# Patient Record
Sex: MALE | Race: WHITE | NOT HISPANIC OR LATINO | Employment: FULL TIME | URBAN - METROPOLITAN AREA
[De-identification: names, ages, dates, MRNs, and addresses within clinical notes are randomized per-mention and may not be internally consistent; named-entity substitution may affect disease eponyms.]

---

## 2024-06-10 ENCOUNTER — HOSPITAL ENCOUNTER (OUTPATIENT)
Dept: TELEMEDICINE | Facility: HOSPITAL | Age: 64
Discharge: HOME | End: 2024-06-10
Payer: COMMERCIAL

## 2024-06-10 ENCOUNTER — HOSPITAL ENCOUNTER (INPATIENT)
Facility: HOSPITAL | Age: 64
LOS: 7 days | Discharge: NURSING, REHAB OR LONG-TERM CARE | DRG: 948 | End: 2024-06-17
Attending: INTERNAL MEDICINE | Admitting: INTERNAL MEDICINE
Payer: COMMERCIAL

## 2024-06-10 DIAGNOSIS — I63.9 STROKE DETERMINED BY CLINICAL ASSESSMENT: Primary | ICD-10-CM

## 2024-06-10 DIAGNOSIS — I63.9 CVA (CEREBRAL VASCULAR ACCIDENT): ICD-10-CM

## 2024-06-10 DIAGNOSIS — R00.1 SINUS BRADYCARDIA: ICD-10-CM

## 2024-06-10 DIAGNOSIS — I63.9 STROKE: ICD-10-CM

## 2024-06-10 DIAGNOSIS — R07.9 CHEST PAIN: ICD-10-CM

## 2024-06-10 PROBLEM — R07.89 OTHER CHEST PAIN: Status: ACTIVE | Noted: 2024-06-10

## 2024-06-10 LAB
ABORH RETYPE: NORMAL
ALBUMIN SERPL-MCNC: 3.9 G/DL (ref 3.4–4.8)
ALBUMIN/GLOB SERPL: 1.3 RATIO (ref 1.1–2)
ALP SERPL-CCNC: 52 UNIT/L (ref 40–150)
ALT SERPL-CCNC: 77 UNIT/L (ref 0–55)
ANION GAP SERPL CALC-SCNC: 6 MEQ/L
AST SERPL-CCNC: 43 UNIT/L (ref 5–34)
AV INDEX (PROSTH): 0.77
AV MEAN GRADIENT: 3 MMHG
AV PEAK GRADIENT: 5 MMHG
AV VELOCITY RATIO: 0.77
BASOPHILS # BLD AUTO: 0.1 X10(3)/MCL
BASOPHILS NFR BLD AUTO: 1.1 %
BILIRUB SERPL-MCNC: 0.9 MG/DL
BSA FOR ECHO PROCEDURE: 2.26 M2
BUN SERPL-MCNC: 10.5 MG/DL (ref 8.4–25.7)
CALCIUM SERPL-MCNC: 9.2 MG/DL (ref 8.8–10)
CHLORIDE SERPL-SCNC: 106 MMOL/L (ref 98–107)
CHOLEST SERPL-MCNC: 141 MG/DL
CHOLEST/HDLC SERPL: 4 {RATIO} (ref 0–5)
CO2 SERPL-SCNC: 25 MMOL/L (ref 23–31)
CREAT SERPL-MCNC: 0.87 MG/DL (ref 0.73–1.18)
CREAT/UREA NIT SERPL: 12
CV ECHO LV RWT: 0.49 CM
DOP CALC AO PEAK VEL: 1.12 M/S
DOP CALC AO VTI: 26.2 CM
DOP CALC LVOT PEAK VEL: 0.86 M/S
DOP CALCLVOT PEAK VEL VTI: 20.1 CM
E WAVE DECELERATION TIME: 174 MSEC
E/A RATIO: 0.86
E/E' RATIO: 9.54 M/S
ECHO LV POSTERIOR WALL: 1 CM (ref 0.6–1.1)
EOSINOPHIL # BLD AUTO: 0.45 X10(3)/MCL (ref 0–0.9)
EOSINOPHIL NFR BLD AUTO: 4.8 %
ERYTHROCYTE [DISTWIDTH] IN BLOOD BY AUTOMATED COUNT: 12.7 % (ref 11.5–17)
EST. AVERAGE GLUCOSE BLD GHB EST-MCNC: 114 MG/DL
FRACTIONAL SHORTENING: 39 % (ref 28–44)
GFR SERPLBLD CREATININE-BSD FMLA CKD-EPI: >60 ML/MIN/1.73/M2
GLOBULIN SER-MCNC: 3 GM/DL (ref 2.4–3.5)
GLUCOSE SERPL-MCNC: 89 MG/DL (ref 82–115)
GROUP & RH: NORMAL
HBA1C MFR BLD: 5.6 %
HCT VFR BLD AUTO: 46.2 % (ref 42–52)
HDLC SERPL-MCNC: 35 MG/DL (ref 35–60)
HGB BLD-MCNC: 15 G/DL (ref 14–18)
IMM GRANULOCYTES # BLD AUTO: 0.02 X10(3)/MCL (ref 0–0.04)
IMM GRANULOCYTES NFR BLD AUTO: 0.2 %
INDIRECT COOMBS: NORMAL
INTERVENTRICULAR SEPTUM: 1.5 CM (ref 0.6–1.1)
LDLC SERPL CALC-MCNC: 89 MG/DL (ref 50–140)
LEFT ATRIUM SIZE: 3.4 CM
LEFT ATRIUM VOLUME INDEX MOD: 25.4 ML/M2
LEFT ATRIUM VOLUME MOD: 57.1 CM3
LEFT INTERNAL DIMENSION IN SYSTOLE: 2.5 CM (ref 2.1–4)
LEFT VENTRICLE DIASTOLIC VOLUME INDEX: 32.98 ML/M2
LEFT VENTRICLE DIASTOLIC VOLUME: 74.2 ML
LEFT VENTRICLE MASS INDEX: 81 G/M2
LEFT VENTRICLE SYSTOLIC VOLUME INDEX: 9.9 ML/M2
LEFT VENTRICLE SYSTOLIC VOLUME: 22.3 ML
LEFT VENTRICULAR INTERNAL DIMENSION IN DIASTOLE: 4.1 CM (ref 3.5–6)
LEFT VENTRICULAR MASS: 182.45 G
LV LATERAL E/E' RATIO: 7.75 M/S
LV SEPTAL E/E' RATIO: 12.4 M/S
LVOT MG: 1 MMHG
LVOT MV: 0.56 CM/S
LYMPHOCYTES # BLD AUTO: 2.73 X10(3)/MCL (ref 0.6–4.6)
LYMPHOCYTES NFR BLD AUTO: 29.1 %
MCH RBC QN AUTO: 29.9 PG (ref 27–31)
MCHC RBC AUTO-ENTMCNC: 32.5 G/DL (ref 33–36)
MCV RBC AUTO: 92 FL (ref 80–94)
MONOCYTES # BLD AUTO: 0.75 X10(3)/MCL (ref 0.1–1.3)
MONOCYTES NFR BLD AUTO: 8 %
MV PEAK A VEL: 0.72 M/S
MV PEAK E VEL: 0.62 M/S
NEUTROPHILS # BLD AUTO: 5.34 X10(3)/MCL (ref 2.1–9.2)
NEUTROPHILS NFR BLD AUTO: 56.8 %
NRBC BLD AUTO-RTO: 0 %
OHS LV EJECTION FRACTION SIMPSONS BIPLANE MOD: 59 %
OHS QRS DURATION: 80 MS
OHS QTC CALCULATION: 422 MS
PLATELET # BLD AUTO: 202 X10(3)/MCL (ref 130–400)
PMV BLD AUTO: 10.8 FL (ref 7.4–10.4)
POTASSIUM SERPL-SCNC: 4.4 MMOL/L (ref 3.5–5.1)
PROT SERPL-MCNC: 6.9 GM/DL (ref 5.8–7.6)
PV PEAK GRADIENT: 2 MMHG
PV PEAK VELOCITY: 0.72 M/S
RBC # BLD AUTO: 5.02 X10(6)/MCL (ref 4.7–6.1)
SODIUM SERPL-SCNC: 137 MMOL/L (ref 136–145)
SPECIMEN OUTDATE: NORMAL
TDI LATERAL: 0.08 M/S
TDI SEPTAL: 0.05 M/S
TDI: 0.07 M/S
TRICUSPID ANNULAR PLANE SYSTOLIC EXCURSION: 1.78 CM
TRIGL SERPL-MCNC: 84 MG/DL (ref 34–140)
TROPONIN I SERPL-MCNC: <0.01 NG/ML (ref 0–0.04)
TROPONIN I SERPL-MCNC: <0.01 NG/ML (ref 0–0.04)
TSH SERPL-ACNC: 0.95 UIU/ML (ref 0.35–4.94)
VLDLC SERPL CALC-MCNC: 17 MG/DL
WBC # SPEC AUTO: 9.39 X10(3)/MCL (ref 4.5–11.5)
Z-SCORE OF LEFT VENTRICULAR DIMENSION IN END DIASTOLE: -6.87
Z-SCORE OF LEFT VENTRICULAR DIMENSION IN END SYSTOLE: -5.38

## 2024-06-10 PROCEDURE — 93010 ELECTROCARDIOGRAM REPORT: CPT | Mod: ,,, | Performed by: STUDENT IN AN ORGANIZED HEALTH CARE EDUCATION/TRAINING PROGRAM

## 2024-06-10 PROCEDURE — 86900 BLOOD TYPING SEROLOGIC ABO: CPT | Performed by: INTERNAL MEDICINE

## 2024-06-10 PROCEDURE — 83036 HEMOGLOBIN GLYCOSYLATED A1C: CPT | Performed by: INTERNAL MEDICINE

## 2024-06-10 PROCEDURE — 36415 COLL VENOUS BLD VENIPUNCTURE: CPT | Performed by: INTERNAL MEDICINE

## 2024-06-10 PROCEDURE — 20000000 HC ICU ROOM

## 2024-06-10 PROCEDURE — 27000221 HC OXYGEN, UP TO 24 HOURS

## 2024-06-10 PROCEDURE — 99205 OFFICE O/P NEW HI 60 MIN: CPT | Mod: GT,,, | Performed by: STUDENT IN AN ORGANIZED HEALTH CARE EDUCATION/TRAINING PROGRAM

## 2024-06-10 PROCEDURE — 80053 COMPREHEN METABOLIC PANEL: CPT | Performed by: INTERNAL MEDICINE

## 2024-06-10 PROCEDURE — 86850 RBC ANTIBODY SCREEN: CPT | Performed by: INTERNAL MEDICINE

## 2024-06-10 PROCEDURE — 84484 ASSAY OF TROPONIN QUANT: CPT

## 2024-06-10 PROCEDURE — 85025 COMPLETE CBC W/AUTO DIFF WBC: CPT | Performed by: INTERNAL MEDICINE

## 2024-06-10 PROCEDURE — 36415 COLL VENOUS BLD VENIPUNCTURE: CPT

## 2024-06-10 PROCEDURE — 84484 ASSAY OF TROPONIN QUANT: CPT | Performed by: INTERNAL MEDICINE

## 2024-06-10 PROCEDURE — 93005 ELECTROCARDIOGRAM TRACING: CPT

## 2024-06-10 PROCEDURE — 84443 ASSAY THYROID STIM HORMONE: CPT | Performed by: INTERNAL MEDICINE

## 2024-06-10 PROCEDURE — 80061 LIPID PANEL: CPT | Performed by: INTERNAL MEDICINE

## 2024-06-10 RX ORDER — LANOLIN ALCOHOL/MO/W.PET/CERES
1 CREAM (GRAM) TOPICAL EVERY MORNING
COMMUNITY

## 2024-06-10 RX ORDER — AMOXICILLIN 500 MG
2 CAPSULE ORAL
COMMUNITY

## 2024-06-10 RX ORDER — TAMSULOSIN HYDROCHLORIDE 0.4 MG/1
CAPSULE ORAL
Status: ON HOLD | COMMUNITY
End: 2024-06-17 | Stop reason: HOSPADM

## 2024-06-10 RX ORDER — OXYCODONE AND ACETAMINOPHEN 5; 325 MG/1; MG/1
1 TABLET ORAL
Status: ON HOLD | COMMUNITY
Start: 2024-01-17 | End: 2024-06-17 | Stop reason: HOSPADM

## 2024-06-10 RX ORDER — SODIUM CHLORIDE 0.9 % (FLUSH) 0.9 %
10 SYRINGE (ML) INJECTION
Status: DISCONTINUED | OUTPATIENT
Start: 2024-06-10 | End: 2024-06-17 | Stop reason: HOSPADM

## 2024-06-10 RX ORDER — BISOPROLOL FUMARATE 5 MG/1
TABLET, FILM COATED ORAL
Status: ON HOLD | COMMUNITY
End: 2024-06-17 | Stop reason: HOSPADM

## 2024-06-10 RX ORDER — PROCHLORPERAZINE EDISYLATE 5 MG/ML
5 INJECTION INTRAMUSCULAR; INTRAVENOUS EVERY 6 HOURS PRN
Status: DISCONTINUED | OUTPATIENT
Start: 2024-06-10 | End: 2024-06-17 | Stop reason: HOSPADM

## 2024-06-10 RX ORDER — ACETAMINOPHEN 325 MG/1
650 TABLET ORAL EVERY 4 HOURS PRN
Status: DISCONTINUED | OUTPATIENT
Start: 2024-06-10 | End: 2024-06-10

## 2024-06-10 RX ORDER — CLOPIDOGREL BISULFATE 75 MG/1
75 TABLET ORAL DAILY
Status: DISCONTINUED | OUTPATIENT
Start: 2024-06-11 | End: 2024-06-10

## 2024-06-10 RX ORDER — ATORVASTATIN CALCIUM 40 MG/1
80 TABLET, FILM COATED ORAL NIGHTLY
Status: DISCONTINUED | OUTPATIENT
Start: 2024-06-10 | End: 2024-06-10

## 2024-06-10 RX ORDER — TALC
6 POWDER (GRAM) TOPICAL NIGHTLY PRN
Status: DISCONTINUED | OUTPATIENT
Start: 2024-06-10 | End: 2024-06-17 | Stop reason: HOSPADM

## 2024-06-10 RX ORDER — ONDANSETRON HYDROCHLORIDE 2 MG/ML
4 INJECTION, SOLUTION INTRAVENOUS EVERY 8 HOURS PRN
Status: DISCONTINUED | OUTPATIENT
Start: 2024-06-10 | End: 2024-06-17 | Stop reason: HOSPADM

## 2024-06-10 RX ORDER — ACETAMINOPHEN 500 MG
25 TABLET ORAL
COMMUNITY

## 2024-06-10 RX ORDER — ASPIRIN 81 MG/1
81 TABLET ORAL
COMMUNITY

## 2024-06-10 RX ORDER — CLOPIDOGREL BISULFATE 75 MG/1
TABLET ORAL
Status: ON HOLD | COMMUNITY
End: 2024-06-17 | Stop reason: HOSPADM

## 2024-06-10 RX ORDER — HYDROXYZINE HYDROCHLORIDE 25 MG/1
TABLET, FILM COATED ORAL
Status: ON HOLD | COMMUNITY
Start: 2024-05-20 | End: 2024-06-17 | Stop reason: HOSPADM

## 2024-06-10 RX ORDER — BUSPIRONE HYDROCHLORIDE 30 MG/1
30 TABLET ORAL
COMMUNITY

## 2024-06-10 RX ORDER — AMLODIPINE BESYLATE 5 MG/1
5 TABLET ORAL
Status: ON HOLD | COMMUNITY
Start: 2024-04-19 | End: 2024-06-17 | Stop reason: HOSPADM

## 2024-06-10 RX ORDER — BISACODYL 10 MG/1
10 SUPPOSITORY RECTAL DAILY PRN
Status: DISCONTINUED | OUTPATIENT
Start: 2024-06-10 | End: 2024-06-17 | Stop reason: HOSPADM

## 2024-06-10 RX ORDER — ONDANSETRON 4 MG/1
TABLET, ORALLY DISINTEGRATING ORAL
COMMUNITY
Start: 2024-01-17

## 2024-06-10 RX ORDER — PRAVASTATIN SODIUM 20 MG/1
TABLET ORAL
Status: ON HOLD | COMMUNITY
End: 2024-06-17 | Stop reason: HOSPADM

## 2024-06-10 RX ORDER — TAMSULOSIN HYDROCHLORIDE 0.4 MG/1
0.4 CAPSULE ORAL DAILY
Status: DISCONTINUED | OUTPATIENT
Start: 2024-06-11 | End: 2024-06-10

## 2024-06-10 RX ORDER — DESONIDE 0.5 MG/G
CREAM TOPICAL
COMMUNITY
Start: 2024-02-19

## 2024-06-10 RX ORDER — PHENAZOPYRIDINE HYDROCHLORIDE 200 MG/1
200 TABLET, FILM COATED ORAL 3 TIMES DAILY PRN
Status: ON HOLD | COMMUNITY
End: 2024-06-10

## 2024-06-10 NOTE — PLAN OF CARE
Problem: Adult Inpatient Plan of Care  Goal: Plan of Care Review  Outcome: Progressing  Goal: Patient-Specific Goal (Individualized)  Outcome: Progressing  Goal: Absence of Hospital-Acquired Illness or Injury  Outcome: Progressing  Goal: Optimal Comfort and Wellbeing  Outcome: Progressing  Goal: Readiness for Transition of Care  Outcome: Progressing     Problem: Stroke, Ischemic (Includes Transient Ischemic Attack)  Goal: Optimal Coping  Outcome: Progressing  Goal: Effective Bowel Elimination  Outcome: Progressing  Goal: Optimal Cerebral Tissue Perfusion  Outcome: Progressing  Goal: Optimal Cognitive Function  Outcome: Progressing  Goal: Improved Communication Skills  Outcome: Progressing  Goal: Optimal Functional Ability  Outcome: Progressing  Goal: Optimal Nutrition Intake  Outcome: Progressing  Goal: Effective Oxygenation and Ventilation  Outcome: Progressing  Goal: Improved Sensorimotor Function  Outcome: Progressing  Goal: Safe and Effective Swallow  Outcome: Progressing  Goal: Effective Urinary Elimination  Outcome: Progressing     Problem: Fall Injury Risk  Goal: Absence of Fall and Fall-Related Injury  Outcome: Progressing     Problem: Skin Injury Risk Increased  Goal: Skin Health and Integrity  Outcome: Progressing

## 2024-06-10 NOTE — SUBJECTIVE & OBJECTIVE
HPI:  64 y.o. male with a PMHx significant for CAD, HTN, BPH, chronic prostatitis presenting with left-sided sensory deficits, left-sided weakness. LKN around 0900. He was cutting grass and started having chest pain and SOB. Then developed left-sided numbness (arm/leg) as well as left-sided weakness (leg > arm).      He is on aspirin and plavix. No AC medications.     /89  BG 90    Images personally reviewed and interpreted:  Study: Head CT  Study Interpretation: No acute intracranial hemorrhage.      Assessment and plan:  # Left-sided weakness and numbness. His is exam is significant for LUE pronator drift, LLE drift, left arm and leg sensory deficits, and difficulty with ambulation. Discussed with ED team and patient. ED team to assess for an r/o aortic dissection in the setting of chest pain (but low suspicion for that at this time). Given disabling symptoms and no contraindications to IV thrombolysis, plan for IV TNK at this time.     Lytics recommendation: Recommend IV Tenecteplase 0.25mg/kg IV push (max dose 25mg); If Tenecteplase is not available use Alteplase 0.9mg/kg IV bolus followed by infusion (max dose 90mg)     Additional Recommendations:   Neurological assessment and vital signs (except temperature) every 15 minutes x 2 hours, then every 30 minutes x 6 hours, then every hour x 16 hours..  Frequency of BP assessments may need to be increased if systolic BP stays >= 180 mm Hg or diastolic BP stays >= 105 mm Hg. Administer antihypertensive meds as ordered  Temperature every 4 hours or as required.  Follow hospital protocol for further orders re: post thrombolytic therapy patient management.  No antithrombotics or anticoagulants (including but not limited to: heparin, warfarin, aspirin, clopidigrel, or dipyridamole) for 24 hours, then start antithrombotics as ordered by treating physician    Adapted from the American Heart Association/American Stroke Association (AHA/ASA) and American Association of  Neuroscience Nurses (AANN) Guidelines.   Thrombectomy recommendation: Awaiting CTA results from [facility] for determination   Placement recommendation: pending further studies     - After, administration of IV TNK, recommend an expedited CTA head/neck to evaluate for potential symptomatic stenosis/occlusive lesion that might significantly increase risk of recurrent or worsening signs/symptoms. If no LVO or high-grade stenosis, patient does not require transfer for acute stroke intervention.   - Goal SBP < 180 after IV thrombolysis.  - Hold all antiplatelet and anticoagulation medications for 24 hours after IV TNK. After 24 hours, continue home aspirin and clopidogrel.   - Start high-intensity statin.   - Recommend follow-up non-urgent MRI brain without contrast to evaluate for acute ischemia.  - Recommend lipid panel, hemoglobin A1c, TTE, PT/OT/SLP evaluations.   - If above studies are abnormal, please load images to teleneurology imaging system and contact us to review.        Please contact us with any further questions or concerns or if patient has any acute neurological changes (new symptoms, worsening deficits).

## 2024-06-10 NOTE — SUBJECTIVE & OBJECTIVE
No past medical history on file.    No past surgical history on file.    Review of patient's allergies indicates:   Allergen Reactions    Lortab [hydrocodone-acetaminophen] Other (See Comments)     GI upset       Current Neurological Medications:     No current facility-administered medications on file prior to encounter.     Current Outpatient Medications on File Prior to Encounter   Medication Sig    amLODIPine (NORVASC) 5 MG tablet Take 5 mg by mouth.    desonide (DESOWEN) 0.05 % cream APPLY 1 APPLICATION SPARINGLY TOPICALLY TO THE AFFECTED AREA OF THE FACE AND FOREHEAD ONCE A DAY 3 TIMES A WEEK    hydrOXYzine HCL (ATARAX) 25 MG tablet TAKE 1 TABLET BY MOUTH EVERY 6 HOURS AS DIRECTED FOR BREAKTHROUGH ANXIETY    ondansetron (ZOFRAN-ODT) 4 MG TbDL DISSOLVE ONE TABLET UNDER THE TONGUE EVERY 6 HOURS    oxyCODONE-acetaminophen (PERCOCET) 5-325 mg per tablet Take 1 tablet by mouth every 4 to 6 hours as needed.    aspirin (ECOTRIN) 81 MG EC tablet Take 81 mg by mouth.    bisoprolol (ZEBETA) 5 MG tablet TAKE 1 TABLET BY MOUTH EVERY DAY FOR HEART    busPIRone (BUSPAR) 30 MG Tab Take 30 mg by mouth.    cholecalciferol, vitamin D3, (VITAMIN D3) 50 mcg (2,000 unit) Cap capsule Take 25 mcg by mouth.    clopidogreL (PLAVIX) 75 mg tablet TAKE 1 TABLET BY MOUTH EVERY DAY FOR CLOT PREVENTION    cyanocobalamin (VITAMIN B-12) 1000 MCG tablet Take 1 tablet by mouth every morning.    omega-3 fatty acids/fish oil (FISH OIL-OMEGA-3 FATTY ACIDS) 300-1,000 mg capsule Take 2 g by mouth.    phenazopyridine (PYRIDIUM) 200 MG tablet Take 200 mg by mouth 3 (three) times daily as needed.    pravastatin (PRAVACHOL) 20 MG tablet TAKE 1 TABLET BY MOUTH DAILY WITH THE EVENING MEAL FOR CHOLESTEROL    tamsulosin (FLOMAX) 0.4 mg Cap TAKE 1 CAPSULE BY MOUTH DAILY FOR PROSTATE     Family History    None       Tobacco Use    Smoking status: Not on file    Smokeless tobacco: Not on file   Substance and Sexual Activity    Alcohol use: Not on file    Drug  "use: Not on file    Sexual activity: Not on file     Review of Systems   All other systems reviewed and are negative.    Objective:     Vital Signs (Most Recent):  Temp: 98.2 °F (36.8 °C) (06/10/24 1500)  Pulse: (!) 55 (06/10/24 1515)  Resp: 10 (06/10/24 1515)  BP: (!) 144/83 (06/10/24 1500)  SpO2: 98 % (06/10/24 1515) Vital Signs (24h Range):  Temp:  [98.2 °F (36.8 °C)] 98.2 °F (36.8 °C)  Pulse:  [53-55] 55  Resp:  [10-21] 10  SpO2:  [98 %] 98 %  BP: (144)/(83) 144/83        There is no height or weight on file to calculate BMI.     Physical Exam  Constitutional:       Appearance: Normal appearance.   HENT:      Head: Normocephalic.      Mouth/Throat:      Mouth: Mucous membranes are moist.   Eyes:      Extraocular Movements: Extraocular movements intact.      Pupils: Pupils are equal, round, and reactive to light.   Cardiovascular:      Rate and Rhythm: Bradycardia present.   Pulmonary:      Effort: Pulmonary effort is normal.   Abdominal:      Palpations: Abdomen is soft.   Musculoskeletal:         General: Normal range of motion.      Cervical back: Normal range of motion and neck supple.   Skin:     General: Skin is warm and dry.      Capillary Refill: Capillary refill takes less than 2 seconds.   Neurological:      Mental Status: He is alert and oriented to person, place, and time.      Cranial Nerves: No cranial nerve deficit.      Sensory: Sensory deficit (left face, arm, and leg) present.      Motor: Weakness (left pronator driftL LLE 4.5/5.) present.   Psychiatric:         Mood and Affect: Mood normal.         Behavior: Behavior normal.          NEUROLOGICAL EXAMINATION:     MENTAL STATUS   Oriented to person, place, and time.     CRANIAL NERVES     CN III, IV, VI   Pupils are equal, round, and reactive to light.      Significant Labs: BMP: No results for input(s): "GLU", "NA", "K", "CL", "CO2", "BUN", "CREATININE", "CALCIUM", "MG" in the last 48 hours.  CBC: No results for input(s): "WBC", "HGB", "HCT", " ""PLT" in the last 48 hours.    Significant Imaging: I have reviewed all pertinent imaging results/findings within the past 24 hours.  "

## 2024-06-10 NOTE — NURSING
Nurses Note -- 4 Eyes      6/10/2024   4:12 PM      Skin assessed during: Admit      [x] No Altered Skin Integrity Present    [x]Prevention Measures Documented      [] Yes- Altered Skin Integrity Present or Discovered   [] LDA Added if Not in Epic (Describe Wound)   [] New Altered Skin Integrity was Present on Admit and Documented in LDA   [] Wound Image Taken    Wound Care Consulted? No    Attending Nurse:  Shari Marinelli RN/Staff Member:  KIMBERLY Nicole

## 2024-06-10 NOTE — HPI
Edison Jacobo is a 64 year-old male with past medical history of CAD, HTN, and BPH who presents as transfer from [facility] for post thrombolytic monitoring in ICU.Per chart, patient was cutting grass and acutely developed Left-sided weakness, leg greater than left arm, along with hemisensory loss of the same side. He also complained of CP and SOB. CT head negative for acute abnormality. Patient received TPA at approximately 11:30 today. NIHSS 4.  CTA H/N showed CTA: >50% Calcification Marco Carotid Arteries.  MT not indicated.   Upon assessment, patient is alert and oriented in all spheres. Positive for LUE pronator drift and hemisensory loss of left face, arm, and leg. He continues to endorse CP. ICU resident at bedside to assess. Post thrombolytic CT head ordered. Will continue to follow.

## 2024-06-10 NOTE — CONSULTS
[facility] - 7th Floor ICU  Neurology  Consult Note    Patient Name: Edison Jacobo  MRN: [id number]  Admission Date: 6/10/2024  Hospital Length of Stay: 0 days  Code Status: Full Code   Attending Provider: Joseph Mejia MD   Consulting Provider: Griselda Pérez NP  Primary Care Physician: Royce Canchola MD  Principal Problem:Stroke determined by clinical assessment    Inpatient consult to Vascular (Stroke) Neurology  Consult performed by: Griselda Pérez NP  Consult ordered by: Joseph Mejia MD         Subjective:     Chief Complaint:  No chief complaint on file.         HPI:   Edison Jacobo is a 64 year-old male with past medical history of CAD, HTN, and BPH who presents as transfer from [facility] for post thrombolytic monitoring in ICU.Per chart, patient was cutting grass and acutely developed Left-sided weakness, leg greater than left arm, along with hemisensory loss of the same side. He also complained of CP and SOB. CT head negative for acute abnormality. Patient received TPA at approximately 11:30 today. NIHSS 4.  CTA H/N showed CTA: >50% Calcification Marco Carotid Arteries.  MT not indicated.   Upon assessment, patient is alert and oriented in all spheres. Positive for LUE pronator drift and hemisensory loss of left face, arm, and leg. He continues to endorse CP. ICU resident at bedside to assess. Post thrombolytic CT head ordered. Will continue to follow.      No past medical history on file.    No past surgical history on file.    Review of patient's allergies indicates:   Allergen Reactions    Lortab [hydrocodone-acetaminophen] Other (See Comments)     GI upset       Current Neurological Medications:     No current facility-administered medications on file prior to encounter.     Current Outpatient Medications on File Prior to Encounter   Medication Sig    amLODIPine (NORVASC) 5 MG tablet Take 5 mg by mouth.    desonide (DESOWEN) 0.05 % cream APPLY 1  APPLICATION SPARINGLY TOPICALLY TO THE AFFECTED AREA OF THE FACE AND FOREHEAD ONCE A DAY 3 TIMES A WEEK    hydrOXYzine HCL (ATARAX) 25 MG tablet TAKE 1 TABLET BY MOUTH EVERY 6 HOURS AS DIRECTED FOR BREAKTHROUGH ANXIETY    ondansetron (ZOFRAN-ODT) 4 MG TbDL DISSOLVE ONE TABLET UNDER THE TONGUE EVERY 6 HOURS    oxyCODONE-acetaminophen (PERCOCET) 5-325 mg per tablet Take 1 tablet by mouth every 4 to 6 hours as needed.    aspirin (ECOTRIN) 81 MG EC tablet Take 81 mg by mouth.    bisoprolol (ZEBETA) 5 MG tablet TAKE 1 TABLET BY MOUTH EVERY DAY FOR HEART    busPIRone (BUSPAR) 30 MG Tab Take 30 mg by mouth.    cholecalciferol, vitamin D3, (VITAMIN D3) 50 mcg (2,000 unit) Cap capsule Take 25 mcg by mouth.    clopidogreL (PLAVIX) 75 mg tablet TAKE 1 TABLET BY MOUTH EVERY DAY FOR CLOT PREVENTION    cyanocobalamin (VITAMIN B-12) 1000 MCG tablet Take 1 tablet by mouth every morning.    omega-3 fatty acids/fish oil (FISH OIL-OMEGA-3 FATTY ACIDS) 300-1,000 mg capsule Take 2 g by mouth.    phenazopyridine (PYRIDIUM) 200 MG tablet Take 200 mg by mouth 3 (three) times daily as needed.    pravastatin (PRAVACHOL) 20 MG tablet TAKE 1 TABLET BY MOUTH DAILY WITH THE EVENING MEAL FOR CHOLESTEROL    tamsulosin (FLOMAX) 0.4 mg Cap TAKE 1 CAPSULE BY MOUTH DAILY FOR PROSTATE     Family History    None       Tobacco Use    Smoking status: Not on file    Smokeless tobacco: Not on file   Substance and Sexual Activity    Alcohol use: Not on file    Drug use: Not on file    Sexual activity: Not on file     Review of Systems   All other systems reviewed and are negative.    Objective:     Vital Signs (Most Recent):  Temp: 98.2 °F (36.8 °C) (06/10/24 1500)  Pulse: (!) 55 (06/10/24 1515)  Resp: 10 (06/10/24 1515)  BP: (!) 144/83 (06/10/24 1500)  SpO2: 98 % (06/10/24 1515) Vital Signs (24h Range):  Temp:  [98.2 °F (36.8 °C)] 98.2 °F (36.8 °C)  Pulse:  [53-55] 55  Resp:  [10-21] 10  SpO2:  [98 %] 98 %  BP: (144)/(83) 144/83        There is no height or  "weight on file to calculate BMI.     Physical Exam  Constitutional:       Appearance: Normal appearance.   HENT:      Head: Normocephalic.      Mouth/Throat:      Mouth: Mucous membranes are moist.   Eyes:      Extraocular Movements: Extraocular movements intact.      Pupils: Pupils are equal, round, and reactive to light.   Cardiovascular:      Rate and Rhythm: Bradycardia present.   Pulmonary:      Effort: Pulmonary effort is normal.   Abdominal:      Palpations: Abdomen is soft.   Musculoskeletal:         General: Normal range of motion.      Cervical back: Normal range of motion and neck supple.   Skin:     General: Skin is warm and dry.      Capillary Refill: Capillary refill takes less than 2 seconds.   Neurological:      Mental Status: He is alert and oriented to person, place, and time.      Cranial Nerves: No cranial nerve deficit.      Sensory: Sensory deficit (left face, arm, and leg) present.      Motor: Weakness (left pronator driftL LLE 4.5/5.) present.   Psychiatric:         Mood and Affect: Mood normal.         Behavior: Behavior normal.          NEUROLOGICAL EXAMINATION:     MENTAL STATUS   Oriented to person, place, and time.     CRANIAL NERVES     CN III, IV, VI   Pupils are equal, round, and reactive to light.      Significant Labs: BMP: No results for input(s): "GLU", "NA", "K", "CL", "CO2", "BUN", "CREATININE", "CALCIUM", "MG" in the last 48 hours.  CBC: No results for input(s): "WBC", "HGB", "HCT", "PLT" in the last 48 hours.    Significant Imaging: I have reviewed all pertinent imaging results/findings within the past 24 hours.  Assessment and Plan:     * Stroke determined by clinical assessment  Stroke   - presented with left hemisensory loss and LSW  - Stroke RF: CAD, HTN  - Intervention: TPA on  6/10/2024 at 1130 am.  - Etiology: TBD    Stroke workup:  -CTh: negative    -CTA h/n: >50% Calcification Marco Carotid Arteries.    -MRI brain:    -ECHO:    -CUS:   -LDL:    -A1c:    -TSH:    -home " medications include: ASA 81 mg  and Plavix      Plan:  -S/p TNK    -permissive HTN for now ... SBP less than 180  -q1hr neuro checks  -STAT CTh for any HA or neuro change  -HOB flat, Bedrest, NPO x24 hours post TNK  -repeat CTh after 24 hours to determine if antiplatelet therapy can be initiated           VTE Risk Mitigation (From admission, onward)           Ordered     Reason for No Pharmacological VTE Prophylaxis  Once        Comments: S/P TPA   Question:  Reasons:  Answer:  Physician Provided (leave comment)    06/10/24 1532     IP VTE HIGH RISK PATIENT  Once         06/10/24 1532     Place sequential compression device  Until discontinued         06/10/24 1532                    Thank you for your consult.  Will follow up with patient.     Griselda Pérez NP  Neurology  [facility] - 7th Floor ICU

## 2024-06-10 NOTE — NURSING
TPA given @ 1130 @ outside facility, pt arrived to ICU @ 1500, q30 neurochecks in place until 1930 pm, q1 neurochecks starting @ 1930 pm per s/p thrombolytic protocol.

## 2024-06-10 NOTE — H&P
[facility] - 7th Floor ICU  Pulmonary Critical Care Note    Patient Name: Edison Jacobo  MRN: [id number]  Admission Date: 6/10/2024  Hospital Length of Stay: 0 days  Code Status: Full Code  Attending Provider: Joseph Mejia MD  Primary Care Provider: Royce Canchola MD     Subjective:     HPI: Edison Jacobo is a 64 year old  American male with a past medical history of hypertension, hyperlipidemia, CAD, myocardial infarction status post stent (2022), stroke without residual deficits (approximate 20 years ago), who presented to [facility] ED (06/10/2024) via transfer from [facility] for higher level of care due to patient initially presenting with left-sided weakness, numbness, tingling concerning for stroke.  He reports the symptoms began at 9:00 a.m. when he was cutting grass and started experiencing 8/10 aching substernal chest pain with radiation to back subsequently developed shortness of breath followed by left-sided weakness (leg> arm) and numbness (arm/leg). Patient presented to [facility] for evaluation at that time. CT head negative for acute abnormality. CTA H/N showed CTA: >50% Calcification Marco Carotid Arteries.  Patient given tPA at approximately 1130 prior to transfer to [facility] for higher level of care. Upon arrival and initial assessment, patient fully awake, alert, oriented but positive for left sided weakness and sensory loss.  Left upper extremity pronator drift present.  Patient also continues to endorse chest pain similar in character to the chest pain he was experiencing this morning but overall improved in severity after receiving aspirin 325 mg, nitro spray, and muscle relaxer) during EMS transport.  EKG showed sinus bradycardia but otherwise unremarkable.  Patient admitted to ICU for chest pain and acute stroke workup.    Hospital Course/Significant events:      24 Hour Interval History:  Not applicable    No past medical history on file.    No  past surgical history on file.    Social History     Socioeconomic History    Marital status:            Current Outpatient Medications   Medication Instructions    amLODIPine (NORVASC) 5 mg, Oral    aspirin (ECOTRIN) 81 mg, Oral    bisoprolol (ZEBETA) 5 MG tablet TAKE 1 TABLET BY MOUTH EVERY DAY FOR HEART    busPIRone (BUSPAR) 30 mg, Oral    cholecalciferol (vitamin D3) (VITAMIN D3) 25 mcg, Oral    clopidogreL (PLAVIX) 75 mg tablet TAKE 1 TABLET BY MOUTH EVERY DAY FOR CLOT PREVENTION    cyanocobalamin (VITAMIN B-12) 1000 MCG tablet 1 tablet, Oral, Every morning    desonide (DESOWEN) 0.05 % cream APPLY 1 APPLICATION SPARINGLY TOPICALLY TO THE AFFECTED AREA OF THE FACE AND FOREHEAD ONCE A DAY 3 TIMES A WEEK    hydrOXYzine HCL (ATARAX) 25 MG tablet TAKE 1 TABLET BY MOUTH EVERY 6 HOURS AS DIRECTED FOR BREAKTHROUGH ANXIETY    omega-3 fatty acids/fish oil (FISH OIL-OMEGA-3 FATTY ACIDS) 300-1,000 mg capsule 2 g, Oral    ondansetron (ZOFRAN-ODT) 4 MG TbDL DISSOLVE ONE TABLET UNDER THE TONGUE EVERY 6 HOURS    oxyCODONE-acetaminophen (PERCOCET) 5-325 mg per tablet 1 tablet, Oral, Every 4-6 hours PRN    pravastatin (PRAVACHOL) 20 MG tablet TAKE 1 TABLET BY MOUTH DAILY WITH THE EVENING MEAL FOR CHOLESTEROL    tamsulosin (FLOMAX) 0.4 mg Cap TAKE 1 CAPSULE BY MOUTH DAILY FOR PROSTATE       Current Inpatient Medications   [START ON 6/11/2024] tamsulosin  0.4 mg Oral Daily       Current Intravenous Infusions        Review of Systems   Constitutional:  Negative for chills and fever.   HENT:  Negative for tinnitus.    Eyes:  Negative for blurred vision, double vision and photophobia.   Respiratory:  Positive for shortness of breath. Negative for cough, hemoptysis and wheezing.    Cardiovascular:  Positive for chest pain. Negative for leg swelling.   Gastrointestinal:  Negative for abdominal pain, constipation and diarrhea.   Genitourinary:  Negative for dysuria and hematuria.   Skin:  Negative for rash.   Neurological:   Positive for dizziness, sensory change (Left-sided) and weakness (Left-sided).          Objective:       Intake/Output Summary (Last 24 hours) at 6/10/2024 1608  Last data filed at 6/10/2024 1500  Gross per 24 hour   Intake --   Output 200 ml   Net -200 ml         Vital Signs (Most Recent):  Temp: 98.2 °F (36.8 °C) (06/10/24 1600)  Pulse: (!) 58 (06/10/24 1600)  Resp: 15 (06/10/24 1600)  BP: (!) 144/83 (06/10/24 1500)  SpO2: 98 % (06/10/24 1600)  Body mass index is 26.5 kg/m².  Weight: 96.2 kg (212 lb) Vital Signs (24h Range):  Temp:  [98.2 °F (36.8 °C)] 98.2 °F (36.8 °C)  Pulse:  [53-58] 58  Resp:  [10-23] 15  SpO2:  [96 %-100 %] 98 %  BP: (144)/(83) 144/83     Physical Exam  Constitutional:       General: He is not in acute distress.     Appearance: He is normal weight. He is not ill-appearing, toxic-appearing or diaphoretic.   HENT:      Head: Normocephalic and atraumatic.   Eyes:      General: No scleral icterus.     Extraocular Movements: Extraocular movements intact.      Conjunctiva/sclera: Conjunctivae normal.      Pupils: Pupils are equal, round, and reactive to light.   Cardiovascular:      Rate and Rhythm: Regular rhythm. Bradycardia present.      Heart sounds: No murmur heard.     No friction rub. No gallop.   Pulmonary:      Effort: Pulmonary effort is normal. No respiratory distress.      Breath sounds: Normal breath sounds. No stridor. No wheezing, rhonchi or rales.   Abdominal:      General: Abdomen is flat. Bowel sounds are normal. There is no distension.      Palpations: Abdomen is soft.      Tenderness: There is no abdominal tenderness.   Musculoskeletal:         General: No swelling.      Right lower leg: No edema.      Left lower leg: No edema.   Skin:     General: Skin is warm and dry.      Capillary Refill: Capillary refill takes less than 2 seconds.      Coloration: Skin is not jaundiced.      Findings: No bruising, erythema, lesion or rash.   Neurological:      Mental Status: He is alert.     "  Comments: AAOx4; CN II-XII grossly intact           Lines/Drains/Airways       None                   Significant Labs:    Lab Results   Component Value Date    WBC 9.39 06/10/2024    HGB 15.0 06/10/2024    HCT 46.2 06/10/2024    MCV 92.0 06/10/2024     06/10/2024           BMP  Lab Results   Component Value Date     06/10/2024    K 4.4 06/10/2024    CO2 25 06/10/2024    BUN 10.5 06/10/2024    CREATININE 0.87 06/10/2024    CALCIUM 9.2 06/10/2024    AGAP 6.0 06/10/2024    ESTGFRAFRICA 97 07/29/2023         ABG  No results for input(s): "PH", "PO2", "PCO2", "HCO3", "POCBASEDEF" in the last 168 hours.    Mechanical Ventilation Support:         Significant Imaging:  I have reviewed the pertinent imaging within the past 24 hours.        Assessment/Plan:     Assessment  Stroke, likely ischemic  TPA given at outside hospital  Atypical chest pain, bradycardia  Loaded with aspirin and given nitro by EMS on transport  EKG showed sinus bradycardia    Plan  TPA given at outside hospital  Continue permissive hypertension  Q1h neurochecks  NPO for now for post TPA monitoring for 24 hours  Will obtain MRI without contrast  Repeat troponin at 2100  Holding home bp and beta blockers for now given permissive hypertension and question of symptomatic bradycardia    DVT Prophylaxis: SCDs  GI Prophylaxis: None     32 minutes of critical care was time spent personally by me on the following activities: development of treatment plan with patient or surrogate and bedside caregivers, discussions with consultants, evaluation of patient's response to treatment, examination of patient, ordering and performing treatments and interventions, ordering and review of laboratory studies, ordering and review of radiographic studies, pulse oximetry, re-evaluation of patient's condition.  This critical care time did not overlap with that of any other provider or involve time for any procedures.     Latrell Bishop MD  Pulmonary Critical Care " Medicine  [facility] - 7th Floor ICU  DOS: 06/10/2024

## 2024-06-10 NOTE — PT/OT/SLP PROGRESS
Physical Therapy      Patient Name:  Edison Jacobo   MRN:  [id number]    Patient not seen today secondary to currently on 24 hour bedrest s/p TPA 11:30. Will f/u 6/11 as appropriate.

## 2024-06-10 NOTE — ASSESSMENT & PLAN NOTE
Stroke   - presented with left hemisensory loss and LSW  - Stroke RF: CAD, HTN  - Intervention: TPA on  6/10/2024 at 1130 am.  - Etiology: TBD    Stroke workup:  -CTh: negative    -CTA h/n: >50% Calcification Marco Carotid Arteries.    -MRI brain:    -ECHO:    -CUS:   -LDL:    -A1c:    -TSH:    -home medications include: ASA 81 mg  and Plavix      Plan:  -S/p TNK    -permissive HTN for now ... SBP less than 180  -q1hr neuro checks  -STAT CTh for any HA or neuro change  -HOB flat, Bedrest, NPO x24 hours post TNK  -repeat CTh after 24 hours to determine if antiplatelet therapy can be initiated

## 2024-06-10 NOTE — TELEMEDICINE CONSULT
[facility]  Vascular Neurology  Comprehensive Stroke Center  TeleVascular Neurology Acute Consultation Note        Consult Information  Consults    Consulting Provider: CHRISTINE JORDAN   Current Providers  No providers found    Patient Location: [facility] TELEMEDICINE ED [facility] PATIENT FLOW C[facility] Emergency Department    Spoke hospital nurse at bedside with patient assisting consultant.  Patient information was obtained from patient.       Stroke Documentation  Acute Stroke Times   Last Known Normal Date: 06/10/24  Last Known Normal Time: 0900  Symptom Onset Date: 06/10/24  Symptom Onset Time: 0900  Stroke Team Called Date: 06/10/24  Stroke Team Called Time: 1054  Stroke Team Arrival Date: 06/10/24  Stroke Team Arrival Time: 1055  CT Interpretation Time: 1105  Thrombolytic Therapy Recommended: Yes  Decision to Treat Time for Tenecteplase: 1113    NIH Scale:  Interval: baseline  1a. Level of Consciousness: 0-->Alert, keenly responsive  1b. LOC Questions: 0-->Answers both questions correctly  1c. LOC Commands: 0-->Performs both tasks correctly  2. Best Gaze: 0-->Normal  3. Visual: 0-->No visual loss  4. Facial Palsy: 0-->Normal symmetrical movements  5a. Motor Arm, Left: 0-->No drift, limb holds 90 (or 45) degrees for full 10 secs  5b. Motor Arm, Right: 0-->No drift, limb holds 90 (or 45) degrees for full 10 secs  6a. Motor Leg, Left: 1-->Drift, leg falls by the end of the 5-sec period but does not hit bed  6b. Motor Leg, Right: 0-->No drift, leg holds 30 degree position for full 5 secs  7. Limb Ataxia: 0-->Absent  8. Sensory: 1-->Mild-to-moderate sensory loss, patient feels pinprick is less sharp or is dull on the affected side, or there is a loss of superficial pain with pinprick, but patient is aware of being touched  9. Best Language: 0-->No aphasia, normal  10. Dysarthria: 0-->Normal  11. Extinction and Inattention (formerly Neglect): 0-->No abnormality  Total (NIH Stroke  Scale): 2      Modified [place]: Score: 0  Henderson Coma Scale:     ABCD2 Score:    [id number] Score:    HAS -BLED Score:    ICH Score:    Hunt & Porras Classification:      There were no vitals taken for this visit.    Lincoln Negative  In your opinion, this was a: Tier 1     Medical Decision Making  HPI:  64 y.o. male with a PMHx significant for CAD, HTN, BPH, chronic prostatitis presenting with left-sided sensory deficits, left-sided weakness. LKN around 0900. He was cutting grass and started having chest pain and SOB. Then developed left-sided numbness (arm/leg) as well as left-sided weakness (leg > arm).      He is on aspirin and plavix. No AC medications.     /89  BG 90    Images personally reviewed and interpreted:  Study: Head CT  Study Interpretation: No acute intracranial hemorrhage.      Assessment and plan:  # Left-sided weakness and numbness. His is exam is significant for LUE pronator drift, LLE drift, left arm and leg sensory deficits, and difficulty with ambulation. Discussed with ED team and patient. ED team to assess for an r/o aortic dissection in the setting of chest pain (but low suspicion for that at this time). Given disabling symptoms and no contraindications to IV thrombolysis, plan for IV TNK at this time.     Lytics recommendation: Recommend IV Tenecteplase 0.25mg/kg IV push (max dose 25mg); If Tenecteplase is not available use Alteplase 0.9mg/kg IV bolus followed by infusion (max dose 90mg)     Additional Recommendations:   Neurological assessment and vital signs (except temperature) every 15 minutes x 2 hours, then every 30 minutes x 6 hours, then every hour x 16 hours..  Frequency of BP assessments may need to be increased if systolic BP stays >= 180 mm Hg or diastolic BP stays >= 105 mm Hg. Administer antihypertensive meds as ordered  Temperature every 4 hours or as required.  Follow hospital protocol for further orders re: post thrombolytic therapy patient management.  No  antithrombotics or anticoagulants (including but not limited to: heparin, warfarin, aspirin, clopidigrel, or dipyridamole) for 24 hours, then start antithrombotics as ordered by treating physician    Adapted from the American Heart Association/American Stroke Association (AHA/ASA) and American Association of Neuroscience Nurses (AANN) Guidelines.   Thrombectomy recommendation: Awaiting CTA results from Sp[facility] for determination   Placement recommendation: pending further studies     - After, administration of IV TNK, recommend an expedited CTA head/neck to evaluate for potential symptomatic stenosis/occlusive lesion that might significantly increase risk of recurrent or worsening signs/symptoms. If no LVO or high-grade stenosis, patient does not require transfer for acute stroke intervention.   - Goal SBP < 180 after IV thrombolysis.  - Hold all antiplatelet and anticoagulation medications for 24 hours after IV TNK. After 24 hours, continue home aspirin and clopidogrel.   - Start high-intensity statin.   - Recommend follow-up non-urgent MRI brain without contrast to evaluate for acute ischemia.  - Recommend lipid panel, hemoglobin A1c, TTE, PT/OT/SLP evaluations.   - If above studies are abnormal, please load images to teleneurology imaging system and contact us to review.        Please contact us with any further questions or concerns or if patient has any acute neurological changes (new symptoms, worsening deficits).           ROS  Physical Exam  No past medical history on file.  No past surgical history on file.  No family history on file.    Diagnoses  Problem Noted   Stroke Determined By Clinical Assessment 6/10/2024       Alessia Wong MD      Emergent/Acute neurological consultation requested by spoke provider due to critical concerns for possible cerebrovascular event that could result in permanent loss of neurologic/bodily function, severe disability or death of this patient.  Immediate/timely evaluation by a  highly prepared expert is paramount for optimal outcomes  High risk for neurological deterioration if not properly diagnosed  High risk for neurological deterioration if not treated promplty/as soon as possible  Complex diagnostic evaluation may be required (advanced imaging)  High risk treatment options (thrombolytics and/or thrombectomy)    Patient care was coordinated with spoke provider, including but not limted to    Discussing likely diagnosis/etiology of symptoms  Making recommendations for further diagnostic studies  Making recommendations for intravenous thrombolytics or other advanced therapies  Making recommendations for disposition (admission/transfer for higher level of care)

## 2024-06-11 LAB
ALBUMIN SERPL-MCNC: 3.7 G/DL (ref 3.4–4.8)
ALBUMIN/GLOB SERPL: 1.4 RATIO (ref 1.1–2)
ALP SERPL-CCNC: 51 UNIT/L (ref 40–150)
ALT SERPL-CCNC: 79 UNIT/L (ref 0–55)
ANION GAP SERPL CALC-SCNC: 8 MEQ/L
AST SERPL-CCNC: 44 UNIT/L (ref 5–34)
BILIRUB SERPL-MCNC: 1.1 MG/DL
BUN SERPL-MCNC: 11.5 MG/DL (ref 8.4–25.7)
CALCIUM SERPL-MCNC: 9.4 MG/DL (ref 8.8–10)
CHLORIDE SERPL-SCNC: 104 MMOL/L (ref 98–107)
CO2 SERPL-SCNC: 26 MMOL/L (ref 23–31)
CREAT SERPL-MCNC: 0.93 MG/DL (ref 0.73–1.18)
CREAT/UREA NIT SERPL: 12
GFR SERPLBLD CREATININE-BSD FMLA CKD-EPI: >60 ML/MIN/1.73/M2
GLOBULIN SER-MCNC: 2.6 GM/DL (ref 2.4–3.5)
GLUCOSE SERPL-MCNC: 101 MG/DL (ref 82–115)
MAGNESIUM SERPL-MCNC: 1.9 MG/DL (ref 1.6–2.6)
PHOSPHATE SERPL-MCNC: 3.2 MG/DL (ref 2.3–4.7)
POTASSIUM SERPL-SCNC: 4.2 MMOL/L (ref 3.5–5.1)
PROT SERPL-MCNC: 6.3 GM/DL (ref 5.8–7.6)
SODIUM SERPL-SCNC: 138 MMOL/L (ref 136–145)

## 2024-06-11 PROCEDURE — 97166 OT EVAL MOD COMPLEX 45 MIN: CPT

## 2024-06-11 PROCEDURE — 94760 N-INVAS EAR/PLS OXIMETRY 1: CPT

## 2024-06-11 PROCEDURE — 83735 ASSAY OF MAGNESIUM: CPT

## 2024-06-11 PROCEDURE — 25000003 PHARM REV CODE 250

## 2024-06-11 PROCEDURE — 99900035 HC TECH TIME PER 15 MIN (STAT)

## 2024-06-11 PROCEDURE — 92523 SPEECH SOUND LANG COMPREHEN: CPT

## 2024-06-11 PROCEDURE — 97162 PT EVAL MOD COMPLEX 30 MIN: CPT

## 2024-06-11 PROCEDURE — 80053 COMPREHEN METABOLIC PANEL: CPT

## 2024-06-11 PROCEDURE — 36415 COLL VENOUS BLD VENIPUNCTURE: CPT

## 2024-06-11 PROCEDURE — 27000221 HC OXYGEN, UP TO 24 HOURS

## 2024-06-11 PROCEDURE — 99223 1ST HOSP IP/OBS HIGH 75: CPT | Mod: ,,, | Performed by: SPECIALIST

## 2024-06-11 PROCEDURE — 84100 ASSAY OF PHOSPHORUS: CPT

## 2024-06-11 PROCEDURE — 21400001 HC TELEMETRY ROOM

## 2024-06-11 PROCEDURE — 25000003 PHARM REV CODE 250: Performed by: INTERNAL MEDICINE

## 2024-06-11 RX ORDER — ASPIRIN 81 MG/1
81 TABLET ORAL DAILY
Status: DISCONTINUED | OUTPATIENT
Start: 2024-06-11 | End: 2024-06-17 | Stop reason: HOSPADM

## 2024-06-11 RX ORDER — TAMSULOSIN HYDROCHLORIDE 0.4 MG/1
0.4 CAPSULE ORAL DAILY
Status: DISCONTINUED | OUTPATIENT
Start: 2024-06-11 | End: 2024-06-14

## 2024-06-11 RX ORDER — BUSPIRONE HYDROCHLORIDE 5 MG/1
30 TABLET ORAL DAILY
Status: DISCONTINUED | OUTPATIENT
Start: 2024-06-11 | End: 2024-06-17 | Stop reason: HOSPADM

## 2024-06-11 RX ORDER — ACETAMINOPHEN 325 MG/1
650 TABLET ORAL EVERY 4 HOURS PRN
Status: DISCONTINUED | OUTPATIENT
Start: 2024-06-11 | End: 2024-06-17 | Stop reason: HOSPADM

## 2024-06-11 RX ORDER — ATORVASTATIN CALCIUM 40 MG/1
80 TABLET, FILM COATED ORAL DAILY
Status: DISCONTINUED | OUTPATIENT
Start: 2024-06-11 | End: 2024-06-13

## 2024-06-11 RX ADMIN — BUSPIRONE HYDROCHLORIDE 30 MG: 15 TABLET ORAL at 11:06

## 2024-06-11 RX ADMIN — ATORVASTATIN CALCIUM 80 MG: 40 TABLET, FILM COATED ORAL at 10:06

## 2024-06-11 RX ADMIN — ACETAMINOPHEN 650 MG: 325 TABLET, FILM COATED ORAL at 06:06

## 2024-06-11 RX ADMIN — ACETAMINOPHEN 650 MG: 325 TABLET, FILM COATED ORAL at 10:06

## 2024-06-11 RX ADMIN — ASPIRIN 81 MG: 81 TABLET, COATED ORAL at 03:06

## 2024-06-11 NOTE — PT/OT/SLP EVAL
"[facility]  Speech Language Pathology Department  Cognitive-Communication Evaluation    Patient Name:  Edison Jacobo   MRN:  [id number]    Recommendations     General recommendations:  SLP intervention not indicated  Communication strategies:  none  Discharge therapy intensity: No Therapy Indicated (pending Pt/OT recs)  Barriers to safe discharge: none    History     Edison Jacobo is a/n 64 y.o. male admitted following left sided weakness. CT head negative for acute abnormality. S/p Tenecteplase. Pt passed swallow screen.     Previous level of Function  Education:high school  Occupation: full time job  Lives: with spouse  Handed: Right  Glasses: yes  Hearing Aids: no  Home Responsibilities: drives, financial management, and medication/health management    Subjective     Patient awake, alert, oriented x4, and cooperative.  Patient goals: "to get better"   Spiritual/Cultural/Anabaptism Beliefs/Practices that affect care:  no  Pain/Comfort:  0/10  Respiratory Status: room air     Objective     ORAL MUSCULATURE  Dentition: own teeth  Facial Movement: WFL  Buccal Strength & Mobility: WFL  Mandibular Strength & Mobility: WFL  Oral Labial Strength & Mobility: WFL    SPEECH PRODUCTION  Phoneme Production: adequate  Voice Quality: adequate  Voice Production: adequate  Speech Rate: appropriate  Loudness: acceptable  Speech Intelligibility  Known Context: Greater that 90%  Unknown Context: Greater that 90%    AUDITORY COMPREHENSION  Identification:  Body parts: 100%  Objects: 100%  Following Directions:  1-Step: 100%  2-Step: 100%  Yes/No Questions:  Biographical: 100%  Environmental: 100%  Simple: 100%  Complex: 100%    VERBAL EXPRESSION  Automatic Speech:  Functional needs: 100%  Months of the year: 100%  Countin%  Repetition:  Phonemes: 100%  Single syllable words: 100%  Bi-syllabic words: 100%  Multi-syllabic words: 100%  Phrase Completion: 100%  Confrontation Naming  Body Parts: " 100%  Objects: 100%  Wh- Questions:  Object name: 100%  Object function: 100%    COGNITION  Orientation:  Person: yes  Place: yes  Time: yes  Situation: yes   Attention:  Focused: Within Functional Limits  Sustained: Within Functional Limits  Memory:  Immediate: Within Functional Limits  Delayed: Within Functional Limits  Short Term: Within Functional Limits  Problem Solving  Functional simple: Within Functional Limits  Functional complex: Within Functional Limits  Organization:  Convergent thinking: Within Functional Limits  Divergent thinking: Within Functional Limits  Sequencing: Within Functional Limits  Executive Function:  Awareness: Within Functional Limits  Planning: Within Functional Limits    Assessment     Pt presents with functional speech and language skills, cognitive linguistic skills note to be at baseline. No skilled SLP intervention is warranted at this time.     Patient Education     Patient provided with verbal education regarding POC.  Understanding was verbalized.     Time Tracking     SLP Treatment Date:   06/11/24  Speech Start Time:  0853  Speech Stop Time:  0910     Speech Total Time (min):  17 min    Billable minutes:  Evaluation of Speech Sound Production with Comprehension and Expression, 17 minutes     06/11/2024

## 2024-06-11 NOTE — PLAN OF CARE
06/11/24 1016   Discharge Assessment   Assessment Type Discharge Planning Assessment   Confirmed/corrected address, phone number and insurance Yes   Confirmed Demographics Correct on Facesheet  (physical address is [address], [place])   Source of Information patient   When was your last doctors appointment? 05/28/24   Communicated DAILY with patient/caregiver Date not available/Unable to determine   Reason For Admission Stroke   People in Home spouse   Facility Arrived From: home   Do you expect to return to your current living situation? Yes   Do you have help at home or someone to help you manage your care at home? Yes   Who are your caregiver(s) and their phone number(s)? Lanny swenson [phone]   Prior to hospitilization cognitive status: Alert/Oriented   Current cognitive status: Alert/Oriented   Walking or Climbing Stairs Difficulty no   Dressing/Bathing Difficulty no   Home Accessibility stairs to enter home   Number of Stairs, Main Entrance six   Stair Railings, Main Entrance railings safe and in good condition   Equipment Currently Used at Home none   Patient currently being followed by outpatient case management? No   Do you currently have service(s) that help you manage your care at home? No   Do you take prescription medications? Yes   Do you have prescription coverage? Yes   Coverage BCBS   Do you have any problems affording any of your prescribed medications? No   Who is going to help you get home at discharge? wife   How do you get to doctors appointments? car, drives self   Are you on dialysis? No   Do you take coumadin? No   Discharge Plan A Home;Home with family   Discharge Plan B Other  (to be determined)   DME Needed Upon Discharge  other (see comments)  (to be determined)   Discharge Plan discussed with: Patient   Transition of Care Barriers None   OTHER   Name(s) of People in Home Lanny swenson

## 2024-06-11 NOTE — NURSING
Nurses Note -- 4 Eyes      6/11/2024   5:11 AM      Skin assessed during: Q Shift Change      [x] No Altered Skin Integrity Present    [x]Prevention Measures Documented      [] Yes- Altered Skin Integrity Present or Discovered   [] LDA Added if Not in Epic (Describe Wound)   [] New Altered Skin Integrity was Present on Admit and Documented in LDA   [] Wound Image Taken    Wound Care Consulted? No    Attending Nurse:  Kings Marinelli RN/Staff Member:  KIMBERLY Jones

## 2024-06-11 NOTE — PT/OT/SLP EVAL
Physical Therapy Evaluation    Patient Name:  Ediosn Jacobo   MRN:  [id number]    Recommendations:     Discharge therapy intensity: High Intensity Therapy   Discharge Equipment Recommendations: to be determined by next level of care   Barriers to discharge:  medical dx, impaired mobility, decreased independence     Assessment:     Edison Jacobo is a 64 y.o. male admitted with a medical diagnosis of L sided weakness, numbness and tingling; s/p tPA.    He presents with the following impairments/functional limitations: weakness, gait instability, decreased upper extremity function, impaired endurance, impaired balance, decreased lower extremity function, impaired self care skills, impaired functional mobility.  Patient tolerated PT eval well. Pt very motivated to get better. Today, pt requires Laya for bed mobility, modAx2 for sit<>stand and initially when ambulating however progressed to only requiring modAx1 when walking in the hallway. Pt would benefit from high intensity placement in order to maximize functional mobility and overall independence.     Rehab Prognosis: Good; patient would benefit from acute skilled PT services to address these deficits and reach maximum level of function.    Recent Surgery: * No surgery found *      Plan:     During this hospitalization, patient would benefit from acute PT services 6 x/week to address the identified rehab impairments via gait training, therapeutic activities, therapeutic exercises, neuromuscular re-education and progress toward the following goals:    Plan of Care Expires:  07/11/24    Subjective     Chief Complaint:  n/a  Patient/Family Comments/goals: to return PLOF   Pain/Comfort:  Pain Rating 1: 0/10    Patients cultural, spiritual, Orthodoxy conflicts given the current situation: no    Living Environment:  Pt lives with spouse in a SLH; 5 steps to enter/exit with railing son both sides  Prior to admission, patients level of function was independent; works at  [employer].    Equipment used at home: none.  DME owned (not currently used): none.    Upon discharge, patient will have assistance from spouse however would benefit from placement in order to maximize functional independence and reduce burden of care.    Objective:     Communicated with NSG prior to session.  Patient found HOB elevated with blood pressure cuff, pulse ox (continuous), telemetry  upon PT entry to room.    General Precautions: Standard, fall (SBP <180)  Orthopedic Precautions:N/A   Braces: N/A  Respiratory Status: Room air  Blood Pressure: 122/72  SpO2: 96%  HR: 66      Exams:  Cognitive Exam:  Patient is oriented to Person, Place, Time, and Situation  Impaired: light/touch L LE  RLE Strength: 5/5  LLE Strength: grossly 3/5  Skin integrity: Visible skin intact      Functional Mobility:  Bed Mobility:     Supine to Sit: stand by assistance  Sit to Supine: minimum assistance  Transfers:     Sit to/from Stand:  moderate assistance and of 2 persons with hand-held assist  Bed to/form chair: minimum assistance for squat pivot   Gait:   Pt first attempts to perform lateral weight shifting with B HHA. Then, progressed to taking lateral steps along EOB with HHA/modAx2   Pt taken out in to hallway; positioned to use R UE on hallway rail. Pt ambulates 10 ft with modA; pt with step through pattern; decreased LLE hip and knee flexion in swing phase; minimal LLE foot clearance; L knee blocked in stance phase- mild instability noted however not overt buckling  Balance: pt SBA for static sitting balance      Treatment & Education:  Patient provided with verbal education  regarding PT role/goals/POC, fall prevention, safety awareness, and discharge/DME recommendations.  Understanding was verbalized.     Patient left HOB elevated with all lines intact, call button in reach, and RN notified.    GOALS:   Multidisciplinary Problems       Physical Therapy Goals          Problem: Physical Therapy    Goal Priority  Disciplines Outcome Goal Variances Interventions   Physical Therapy Goal     PT, PT/OT Progressing     Description: Goals to be met by: 24     Patient will increase functional independence with mobility by performin. Supine to sit with [place]  2. Sit to supine with [place]  3. Sit to stand transfer with Stand-by Assistance  4. Gait  x 150 feet with Stand-by Assistance using LRAD.   5. Increased functional strength to 5/5 for L LE.                         History:     No past medical history on file.    No past surgical history on file.    Time Tracking:     PT Received On: 24  PT Start Time: 1322     PT Stop Time: 1351  PT Total Time (min): 29 min     Billable Minutes: Evaluation mod      2024

## 2024-06-11 NOTE — PLAN OF CARE
Problem: Occupational Therapy  Goal: Occupational Therapy Goal  Description: Goals to be met by: 7/11/24     Patient will increase functional independence with ADLs by performing:    UE Dressing with Supervision.  LE Dressing with Supervision.  Grooming while standing at sink with Supervision.  Toileting from toilet with Supervision for hygiene and clothing management.   Toilet transfer to toilet with Supervision.  Increased functional strength to 4/5 through therEx, neuromuscular re-ed  Pt will visually scan L side of environment with no verbal cues.    Outcome: Progressing

## 2024-06-11 NOTE — PLAN OF CARE
Noted therapy note for high intensity therapy. Sent message to hospitalist and nurse regarding recommendation. Spoke to patient about which rehab he would want with patient choice. He wants [facility].

## 2024-06-11 NOTE — PLAN OF CARE
Problem: Physical Therapy  Goal: Physical Therapy Goal  Description: Goals to be met by: 24     Patient will increase functional independence with mobility by performin. Supine to sit with [place]  2. Sit to supine with [place]  3. Sit to stand transfer with Stand-by Assistance  4. Gait  x 150 feet with Stand-by Assistance using LRAD.   5. Increased functional strength to 5/5 for L LE.    Outcome: Progressing      Called Patient and conveyed message from Dr Fuller. Patient had no questions or concerns.

## 2024-06-11 NOTE — PROGRESS NOTES
[facility] - 7th Floor ICU  Neurology  Progress Note    Patient Name: Edison Jacobo  MRN: [id number]  Admission Date: 6/10/2024  Hospital Length of Stay: 1 days  Code Status: Full Code   Attending Provider: Joseph Mejia MD  Primary Care Physician: Royce Canchola MD   Principal Problem:Stroke determined by clinical assessment    HPI:   Edison Jacobo is a 64 year-old male with past medical history of CAD, HTN, and BPH who presents as transfer from [facility] for post thrombolytic monitoring in ICU.Per chart, patient was cutting grass and acutely developed Left-sided weakness, leg greater than left arm, along with hemisensory loss of the same side. He also complained of CP and SOB. CT head negative for acute abnormality. Patient received TPA at approximately 11:30 today. NIHSS 4.  CTA H/N showed CTA: >50% Calcification Marco Carotid Arteries.  MT not indicated.   Upon assessment, patient is alert and oriented in all spheres. Positive for LUE pronator drift and hemisensory loss of left face, arm, and leg. He continues to endorse CP. ICU resident at bedside to assess. Post thrombolytic CT head ordered. Will continue to follow.     Overview/Hospital Course:  No notes on file        Subjective:     Interval History: No acute events overnight. Patient continues to have LSW upper and lower extremities along with left hemisensory loss. States that CP has resolved, troponin's negative. Continues to have mid back pain. Unsure if CT chest was obtained at OSH. Will investigate. Post thrombectomy CT head pending.     Current Neurological Medications:     Current Facility-Administered Medications   Medication Dose Route Frequency Provider Last Rate Last Admin    acetaminophen tablet 650 mg  650 mg Oral Q4H PRN Xavier Moise MD   650 mg at 06/11/24 0600    bisacodyL suppository 10 mg  10 mg Rectal Daily PRN Griselda Pérez NP        melatonin tablet 6 mg  6 mg Oral Nightly PRN Edna  MD Latrell        ondansetron injection 4 mg  4 mg Intravenous Q8H PRN Latrell Bishop MD        prochlorperazine injection Soln 5 mg  5 mg Intravenous Q6H PRN Latrell Bishop MD        sodium chloride 0.9% flush 10 mL  10 mL Intravenous PRN Griselda Pérez NP        sodium chloride 0.9% flush 10 mL  10 mL Intravenous PRN Latrell Bishop MD           Review of Systems  Objective:     Vital Signs (Most Recent):  Temp: 97.7 °F (36.5 °C) (06/11/24 0400)  Pulse: (!) 58 (06/11/24 0445)  Resp: 17 (06/11/24 0445)  BP: 103/67 (06/11/24 0445)  SpO2: 95 % (06/11/24 0445) Vital Signs (24h Range):  Temp:  [97.7 °F (36.5 °C)-98.3 °F (36.8 °C)] 97.7 °F (36.5 °C)  Pulse:  [53-68] 58  Resp:  [3-26] 17  SpO2:  [87 %-100 %] 95 %  BP: ()/(65-88) 103/67     Weight: 96.2 kg (212 lb)  Body mass index is 26.5 kg/m².     Physical Exam      Constitutional:       Appearance: Normal appearance.   HENT:      Head: Normocephalic.      Mouth/Throat:      Mouth: Mucous membranes are moist.   Eyes:      Extraocular Movements: Extraocular movements intact.      Pupils: Pupils are equal, round, and reactive to light.   Cardiovascular:      Rate and Rhythm: Bradycardia present.   Pulmonary:      [place]: Pulmonary effort is normal.   Abdominal:      Palpations: Abdomen is soft.   Musculoskeletal:         General: Normal range of motion.      Cervical back: Normal range of motion and neck supple.   Skin:     General: Skin is warm and dry.      Capillary Refill: Capillary refill takes less than 2 seconds.   Neurological:      Mental Status: He is alert and oriented to person, place, and time.      Cranial Nerves: No cranial nerve deficit.      Sensory: Sensory deficit (left face, arm, and leg) present.      Motor: Weakness (left pronator driftL LLE 4.5/5.) present.   Psychiatric:         Mood and Affect: Mood normal.         Behavior: Behavior normal.            NEUROLOGICAL EXAMINATION:      MENTAL STATUS   Oriented to person, place, and time.       CRANIAL NERVES      CN III, IV, VI   Pupils are equal, round, and reactive to light.       Significant Labs: CBC:   Recent Labs   Lab 06/10/24  1527   WBC 9.39   HGB 15.0   HCT 46.2        CMP:   Recent Labs   Lab 06/10/24  1527 06/11/24  0311    138   K 4.4 4.2    104   CO2 25 26   BUN 10.5 11.5   CREATININE 0.87 0.93   CALCIUM 9.2 9.4   MG  --  1.90   ALBUMIN 3.9 3.7   BILITOT 0.9 1.1   ALKPHOS 52 51   AST 43* 44*   ALT 77* 79*       Significant Imaging: I have reviewed all pertinent imaging results/findings within the past 24 hours.  Assessment and Plan:     * Stroke determined by clinical assessment  Stroke   - presented with left hemisensory loss and LSW  - Stroke RF: CAD, HTN  - Intervention: TPA on  6/10/2024 at 1130 am.  - Etiology: TBD    Stroke workup:  -CTh: negative    -CTA h/n: >50% Calcification Marco Carotid Arteries.    -MRI brain:    -ECHO:  Normal left atrial size.Study is negative for shunt.ejection fraction of 55 - 60%.  -LDL:  89  -TSH:   0.955  -home medications include: ASA 81 mg  and Plavix      Plan:  -S/p TNK    -continue to follow workup.  -permissive HTN for now ... SBP less than 180  -q1hr neuro checks  -STAT CTh for any HA or neuro change  -HOB flat, Bedrest, NPO x24 hours post TNK  -repeat CTh after 24 hours to determine if antiplatelet therapy can be initiated     Will update plan of care once imaging completed.           VTE Risk Mitigation (From admission, onward)           Ordered     Reason for No Pharmacological VTE Prophylaxis  Once        Comments: S/P TPA   Question:  Reasons:  Answer:  Physician Provided (leave comment)    06/10/24 1532     IP VTE HIGH RISK PATIENT  Once         06/10/24 1532     Place sequential compression device  Until discontinued         06/10/24 1532                    Griselda Pérez NP  Neurology  [facility] - 7th Floor ICU

## 2024-06-11 NOTE — NURSING
Nurses Note -- 4 Eyes      6/11/2024   4:00 PM      Skin assessed during: Daily Assessment      [x] No Altered Skin Integrity Present    [x]Prevention Measures Documented      [] Yes- Altered Skin Integrity Present or Discovered   [] LDA Added if Not in Epic (Describe Wound)   [] New Altered Skin Integrity was Present on Admit and Documented in LDA   [] Wound Image Taken    Wound Care Consulted? No    Attending Nurse:  KIMBERLY Jorgensen     Second RN/Staff Member:  KIMBERLY Talbot

## 2024-06-11 NOTE — SUBJECTIVE & OBJECTIVE
Subjective:     Interval History: No acute events overnight. Patient continues to have LSW upper and lower extremities along with left hemisensory loss. States that CP has resolved, troponin's negative. Continues to have mid back pain. Unsure if CT chest was obtained at OSH. Will investigate. Post thrombectomy CT head pending.     Current Neurological Medications:     Current Facility-Administered Medications   Medication Dose Route Frequency Provider Last Rate Last Admin    acetaminophen tablet 650 mg  650 mg Oral Q4H PRN Xavier Moise MD   650 mg at 06/11/24 0600    bisacodyL suppository 10 mg  10 mg Rectal Daily PRN Griselda Pérez, LOREN        melatonin tablet 6 mg  6 mg Oral Nightly PRN Latrell Bishop MD        ondansetron injection 4 mg  4 mg Intravenous Q8H PRN Latrell Bishop MD        prochlorperazine injection Soln 5 mg  5 mg Intravenous Q6H PRN Latrell Bishop MD        sodium chloride 0.9% flush 10 mL  10 mL Intravenous PRN Griselda Pérez, NP        sodium chloride 0.9% flush 10 mL  10 mL Intravenous PRN Latrell Bishop MD           Review of Systems  Objective:     Vital Signs (Most Recent):  Temp: 97.7 °F (36.5 °C) (06/11/24 0400)  Pulse: (!) 58 (06/11/24 0445)  Resp: 17 (06/11/24 0445)  BP: 103/67 (06/11/24 0445)  SpO2: 95 % (06/11/24 0445) Vital Signs (24h Range):  Temp:  [97.7 °F (36.5 °C)-98.3 °F (36.8 °C)] 97.7 °F (36.5 °C)  Pulse:  [53-68] 58  Resp:  [3-26] 17  SpO2:  [87 %-100 %] 95 %  BP: ()/(65-88) 103/67     Weight: 96.2 kg (212 lb)  Body mass index is 26.5 kg/m².     Physical Exam      Constitutional:       Appearance: Normal appearance.   HENT:      Head: Normocephalic.      Mouth/Throat:      Mouth: Mucous membranes are moist.   Eyes:      Extraocular Movements: Extraocular movements intact.      Pupils: Pupils are equal, round, and reactive to light.   Cardiovascular:      Rate and Rhythm: Bradycardia present.   Pulmonary:      [place]: Pulmonary effort is normal.   Abdominal:       Palpations: Abdomen is soft.   Musculoskeletal:         General: Normal range of motion.      Cervical back: Normal range of motion and neck supple.   Skin:     General: Skin is warm and dry.      Capillary Refill: Capillary refill takes less than 2 seconds.   Neurological:      Mental Status: He is alert and oriented to person, place, and time.      Cranial Nerves: No cranial nerve deficit.      Sensory: Sensory deficit (left face, arm, and leg) present.      Motor: Weakness (left pronator driftL LLE 4.5/5.) present.   Psychiatric:         Mood and Affect: Mood normal.         Behavior: Behavior normal.            NEUROLOGICAL EXAMINATION:      MENTAL STATUS   Oriented to person, place, and time.      CRANIAL NERVES      CN III, IV, VI   Pupils are equal, round, and reactive to light.       Significant Labs: CBC:   Recent Labs   Lab 06/10/24  1527   WBC 9.39   HGB 15.0   HCT 46.2        CMP:   Recent Labs   Lab 06/10/24  1527 06/11/24  0311    138   K 4.4 4.2    104   CO2 25 26   BUN 10.5 11.5   CREATININE 0.87 0.93   CALCIUM 9.2 9.4   MG  --  1.90   ALBUMIN 3.9 3.7   BILITOT 0.9 1.1   ALKPHOS 52 51   AST 43* 44*   ALT 77* 79*       Significant Imaging: I have reviewed all pertinent imaging results/findings within the past 24 hours.

## 2024-06-11 NOTE — PLAN OF CARE
06/11/24 1548   Discharge Reassessment   Assessment Type Discharge Planning Reassessment   Did the patient's condition or plan change since previous assessment? Yes   Discharge Plan discussed with: Patient   Communicated DAILY with patient/caregiver Date not available/Unable to determine   Discharge Plan A Rehab   Discharge Plan B Rehab   DME Needed Upon Discharge  other (see comments)  (to be determined)   Transition of Care Barriers None   Why the patient remains in the hospital Requires continued medical care   Post-Acute Status   Post-Acute Authorization Placement   Post-Acute Placement Status Referrals Sent   Patient choice form signed by patient/caregiver List with quality metrics by geographic area provided   Discharge Delays None known at this time

## 2024-06-11 NOTE — PT/OT/SLP EVAL
"Occupational Therapy  Evaluation    Name: Edison Jacobo  MRN: [id number]  Admitting Diagnosis: Stroke  Recent Surgery: * No surgery found *      Recommendations:     Discharge therapy intensity: High Intensity Therapy   Discharge Equipment Recommendations:  to be determined by next level of care  Barriers to discharge:   none evident    Assessment:     Edison Jacobo is a 64 y.o. male with a medical diagnosis of stroke s/p TPA.  He presents with LUE weakness and numbness.  Excellent high intensity therapy candidate to return to function.  Overall, modx2 for functional mobility, progressing to mod A.  Max A LB dressing.  Min A pivot to chair with use of RLE and RUE.  He presents with the following performance deficits affecting function: weakness, impaired self care skills, impaired functional mobility, gait instability, impaired balance, visual deficits, decreased coordination, decreased upper extremity function, decreased lower extremity function, impaired fine motor.     Rehab Prognosis: Good; patient would benefit from acute skilled OT services to address these deficits and reach maximum level of function.       Plan:     Patient to be seen 6 x/week to address the above listed problems via self-care/home management, therapeutic exercises, neuromuscular re-education, therapeutic activities  Plan of Care Expires:    Plan of Care Reviewed with: patient    Subjective     Chief Complaint: "I never knew how other people felt when they had a stroke"  Patient/Family Comments/goals: "get stronger and better"    Occupational Profile:  Living Environment: lives with wife, 5 steps B rail, tub/shower, no DME  Previous level of function: independent, works as a  at [employer]  Roles and Routines: , employee, friend  Equipment Used at Home: none  Assistance upon Discharge: wife post rehabilitation    Pain/Comfort:  Pain Rating 1: 0/10    Patients cultural, spiritual, Restorationist conflicts given the current " situation:      Objective:     OT communicated with RN prior to session.      Patient was found HOB elevated with  (vital monitoring) upon OT entry to room.    General Precautions: Standard, fall (<180)  Orthopedic Precautions: N/A  Braces: N/A    Vital Signs: Blood Pressure: 122/72 HR 71, /82 with dizziness  Functional Mobility/Transfers:  Patient completed Sit > Stand Transfer with moderate assistancex2 with gait belt, HHAx2 from bed level  Patient completed Bed <> Chair Transfer using Stand Pivot technique with minimum assistance with gait belt  Functional Mobility: min A to pivot transfer to bedside chair, mod A x2 standing and weight shifting, no L knee buckling noted, relying heavily on UE support.  He did progress towards mod A as session progressed.    Activities of Daily Living:  Lower Body Dressing: maximal assistance    Grooming: SBA  Functional Cognition:  Follows commands, alert, orientedx4, no changes in speech    Visual Perceptual Skills:  Appears to have L visual field cut  Wears glasses at baseline    Upper Extremity Function:  Right Upper Extremity:   WFL    Left Upper Extremity, numbness present throughout LUE, decreased coordination  3-/5    Balance:   SBA static sitting balance    Therapeutic Positioning  Risk for acquired pressure injuries is decreased due to continence.    OT interventions performed during the course of today's session:   Education was provided on benefits of and recommendations for therapeutic positioning    Skin assessment: all bony prominences were assessed    Findings: no redness or breakdown noted    OT recommendations for therapeutic positioning throughout hospitalization:   Follow [facility] Pressure Injury Prevention Protocol        Patient Education:  Patient provided with verbal education education regarding OT role/goals/POC.  Understanding was verbalized, however additional teaching warranted.     Patient left HOB elevated with all lines intact and call button in  reach.    GOALS:   Multidisciplinary Problems       Occupational Therapy Goals          Problem: Occupational Therapy    Goal Priority Disciplines Outcome Interventions   Occupational Therapy Goal     OT, PT/OT Progressing    Description: Goals to be met by: 7/11/24     Patient will increase functional independence with ADLs by performing:    UE Dressing with Supervision.  LE Dressing with Supervision.  Grooming while standing at sink with Supervision.  Toileting from toilet with Supervision for hygiene and clothing management.   Toilet transfer to toilet with Supervision.  Increased functional strength to 4/5 through therEx, neuromuscular re-ed  Pt will visually scan L side of environment with no verbal cues.                         History:     No past medical history on file.    No past surgical history on file.    Time Tracking:     OT Date of Treatment:    OT Start Time: 1315  OT Stop Time: 1350  OT Total Time (min): 35 min    Billable Minutes:Evaluation MOD    6/11/2024

## 2024-06-11 NOTE — PROGRESS NOTES
[facility] - 7th Floor ICU  Pulmonary Critical Care Note    Patient Name: Edisno Jacobo  MRN: [id number]  Admission Date: 6/10/2024  Hospital Length of Stay: 1 days  Code Status: Full Code  Attending Provider: Joseph Mejia MD  Primary Care Provider: Royce Canchola MD     Subjective:     HPI: Edison Jacobo is a 64 year old  American male with a past medical history of hypertension, hyperlipidemia, CAD, myocardial infarction status post stent (2022), stroke without residual deficits (approximate 20 years ago), who presented to [facility] ED (06/10/2024) via transfer from [facility] for higher level of care due to patient initially presenting with left-sided weakness, numbness, tingling concerning for stroke.  He reports the symptoms began at 9:00 a.m. when he was cutting grass and started experiencing 8/10 aching substernal chest pain with radiation to back subsequently developed shortness of breath followed by left-sided weakness (leg> arm) and numbness (arm/leg). Patient presented to [facility] for evaluation at that time. CT head negative for acute abnormality. CTA H/N showed CTA: >50% Calcification Marco Carotid Arteries.  Patient given tPA at approximately 1130 prior to transfer to [facility] for higher level of care. Upon arrival and initial assessment, patient fully awake, alert, oriented but positive for left sided weakness and sensory loss.  Left upper extremity pronator drift present.  Patient also continues to endorse chest pain similar in character to the chest pain he was experiencing this morning but overall improved in severity after receiving aspirin 325 mg, nitro spray, and muscle relaxer) during EMS transport.  EKG showed sinus bradycardia but otherwise unremarkable.  Patient admitted to ICU for chest pain and acute stroke workup.        24 Hour Interval History:  No acute events overnight.  No new neurologic deficits.  Echo with preserved ejection  fraction, diastolic dysfunction, negative bubble study.        ROS negative unless documented in the history of present illness.         Social History     Socioeconomic History    Marital status:        Current Outpatient Medications   Medication Instructions    amLODIPine (NORVASC) 5 mg, Oral    aspirin (ECOTRIN) 81 mg, Oral    bisoprolol (ZEBETA) 5 MG tablet TAKE 1 TABLET BY MOUTH EVERY DAY FOR HEART    busPIRone (BUSPAR) 30 mg, Oral    cholecalciferol (vitamin D3) (VITAMIN D3) 25 mcg, Oral    clopidogreL (PLAVIX) 75 mg tablet TAKE 1 TABLET BY MOUTH EVERY DAY FOR CLOT PREVENTION    cyanocobalamin (VITAMIN B-12) 1000 MCG tablet 1 tablet, Oral, Every morning    desonide (DESOWEN) 0.05 % cream APPLY 1 APPLICATION SPARINGLY TOPICALLY TO THE AFFECTED AREA OF THE FACE AND FOREHEAD ONCE A DAY 3 TIMES A WEEK    hydrOXYzine HCL (ATARAX) 25 MG tablet TAKE 1 TABLET BY MOUTH EVERY 6 HOURS AS DIRECTED FOR BREAKTHROUGH ANXIETY    omega-3 fatty acids/fish oil (FISH OIL-OMEGA-3 FATTY ACIDS) 300-1,000 mg capsule 2 g, Oral    ondansetron (ZOFRAN-ODT) 4 MG TbDL DISSOLVE ONE TABLET UNDER THE TONGUE EVERY 6 HOURS    oxyCODONE-acetaminophen (PERCOCET) 5-325 mg per tablet 1 tablet, Oral, Every 4-6 hours PRN    pravastatin (PRAVACHOL) 20 MG tablet TAKE 1 TABLET BY MOUTH DAILY WITH THE EVENING MEAL FOR CHOLESTEROL    tamsulosin (FLOMAX) 0.4 mg Cap TAKE 1 CAPSULE BY MOUTH DAILY FOR PROSTATE       Current Inpatient Medications        Current Intravenous Infusions      Objective:       Intake/Output Summary (Last 24 hours) at 6/11/2024 1006  Last data filed at 6/11/2024 0400  Gross per 24 hour   Intake --   Output 750 ml   Net -750 ml       Vital Signs (Most Recent):  Temp: 97.7 °F (36.5 °C) (06/11/24 0400)  Pulse: (!) 58 (06/11/24 0445)  Resp: 17 (06/11/24 0445)  BP: 103/67 (06/11/24 0445)  SpO2: 95 % (06/11/24 0445)  Body mass index is 26.5 kg/m².  Weight: 96.2 kg (212 lb) Vital Signs (24h Range):  Temp:  [97.7 °F (36.5 °C)-98.3 °F  (36.8 °C)] 97.7 °F (36.5 °C)  Pulse:  [53-68] 58  Resp:  [3-26] 17  SpO2:  [87 %-100 %] 95 %  BP: ()/(65-88) 103/67       Physical exam:  Gen- A/O, NAD  HENT- ATNC, MMM  CV- RRR  Resp- CTAB, normal work of breathing  MSK- WWP, no LE edema  Neuro- reduced sensation in LUE, LLE and left face, 4/5 strength in LUE and LLE, preserved right sided strength   Psych- appropriate mood and affect       Lines/Drains/Airways       Peripheral Intravenous Line  Duration                  Peripheral IV - Single Lumen 18 G Anterior;Left Forearm -- days         Peripheral IV - Single Lumen 20 G Anterior;Proximal;Right Hand -- days                  Significant Labs:  Lab Results   Component Value Date    WBC 9.39 06/10/2024    HGB 15.0 06/10/2024    HCT 46.2 06/10/2024    MCV 92.0 06/10/2024     06/10/2024       BMP  Lab Results   Component Value Date     06/11/2024    K 4.2 06/11/2024    CO2 26 06/11/2024    BUN 11.5 06/11/2024    CREATININE 0.93 06/11/2024    CALCIUM 9.4 06/11/2024    AGAP 8.0 06/11/2024    ESTGFRAFRICA 97 07/29/2023         Assessment/Plan:     Assessment  Stroke, likely ischemic  TPA given at outside hospital  Atypical chest pain, bradycardia  Loaded with aspirin and given nitro by EMS on transport  EKG showed sinus bradycardia    Plan  -s/p TPA at OSH, no significant change in neurologic findings following administration   -24 hour post tPA monitoring to conclude at approximately 11:30 a.m. this morning, repeat CT head to follow   -start therapy evaluations following 24 hour post TPA period today  -MRI brain pending  -restart antiplatelet medications at discretion of Neurology  -remain in ICU until completion of 24 hour monitoring        DVT Prophylaxis: SCDs  GI Prophylaxis: None          Joseph Mejia MD  Pulmonary Critical Care Medicine  [facility] - 7th Floor ICU  DOS: 06/11/2024

## 2024-06-11 NOTE — ASSESSMENT & PLAN NOTE
Stroke   - presented with left hemisensory loss and LSW  - Stroke RF: CAD, HTN  - Intervention: TPA on  6/10/2024 at 1130 am.  - Etiology: TBD    Stroke workup:  -CTh: negative    -CTA h/n: >50% Calcification Marco Carotid Arteries.    -MRI brain:    -ECHO:  Normal left atrial size.Study is negative for shunt.ejection fraction of 55 - 60%.  -LDL:  89  -TSH:   0.955  -home medications include: ASA 81 mg  and Plavix      Plan:  -S/p TNK    -continue to follow workup.  -permissive HTN for now ... SBP less than 180  -q1hr neuro checks  -STAT CTh for any HA or neuro change  -HOB flat, Bedrest, NPO x24 hours post TNK  -repeat CTh after 24 hours to determine if antiplatelet therapy can be initiated     Will update plan of care once imaging completed.

## 2024-06-12 PROBLEM — R53.1 LEFT-SIDED WEAKNESS: Status: ACTIVE | Noted: 2024-06-12

## 2024-06-12 LAB
ALBUMIN SERPL-MCNC: 3.7 G/DL (ref 3.4–4.8)
ALBUMIN/GLOB SERPL: 1.5 RATIO (ref 1.1–2)
ALP SERPL-CCNC: 49 UNIT/L (ref 40–150)
ALT SERPL-CCNC: 64 UNIT/L (ref 0–55)
ANION GAP SERPL CALC-SCNC: 6 MEQ/L
AST SERPL-CCNC: 30 UNIT/L (ref 5–34)
BILIRUB SERPL-MCNC: 0.6 MG/DL
BUN SERPL-MCNC: 14 MG/DL (ref 8.4–25.7)
CALCIUM SERPL-MCNC: 9.6 MG/DL (ref 8.8–10)
CHLORIDE SERPL-SCNC: 105 MMOL/L (ref 98–107)
CO2 SERPL-SCNC: 28 MMOL/L (ref 23–31)
CREAT SERPL-MCNC: 0.99 MG/DL (ref 0.73–1.18)
CREAT/UREA NIT SERPL: 14
GFR SERPLBLD CREATININE-BSD FMLA CKD-EPI: >60 ML/MIN/1.73/M2
GLOBULIN SER-MCNC: 2.5 GM/DL (ref 2.4–3.5)
GLUCOSE SERPL-MCNC: 99 MG/DL (ref 82–115)
MAGNESIUM SERPL-MCNC: 1.9 MG/DL (ref 1.6–2.6)
PHOSPHATE SERPL-MCNC: 3.9 MG/DL (ref 2.3–4.7)
POTASSIUM SERPL-SCNC: 4.6 MMOL/L (ref 3.5–5.1)
PROT SERPL-MCNC: 6.2 GM/DL (ref 5.8–7.6)
SODIUM SERPL-SCNC: 139 MMOL/L (ref 136–145)

## 2024-06-12 PROCEDURE — 83735 ASSAY OF MAGNESIUM: CPT

## 2024-06-12 PROCEDURE — 84100 ASSAY OF PHOSPHORUS: CPT

## 2024-06-12 PROCEDURE — 80053 COMPREHEN METABOLIC PANEL: CPT

## 2024-06-12 PROCEDURE — 25500020 PHARM REV CODE 255: Performed by: STUDENT IN AN ORGANIZED HEALTH CARE EDUCATION/TRAINING PROGRAM

## 2024-06-12 PROCEDURE — 99900035 HC TECH TIME PER 15 MIN (STAT)

## 2024-06-12 PROCEDURE — 97116 GAIT TRAINING THERAPY: CPT

## 2024-06-12 PROCEDURE — 21400001 HC TELEMETRY ROOM

## 2024-06-12 PROCEDURE — 25000003 PHARM REV CODE 250: Performed by: INTERNAL MEDICINE

## 2024-06-12 PROCEDURE — 99232 SBSQ HOSP IP/OBS MODERATE 35: CPT | Mod: ,,, | Performed by: SPECIALIST

## 2024-06-12 PROCEDURE — 25000003 PHARM REV CODE 250

## 2024-06-12 PROCEDURE — 97535 SELF CARE MNGMENT TRAINING: CPT

## 2024-06-12 PROCEDURE — 97530 THERAPEUTIC ACTIVITIES: CPT

## 2024-06-12 PROCEDURE — 36415 COLL VENOUS BLD VENIPUNCTURE: CPT

## 2024-06-12 PROCEDURE — 27000221 HC OXYGEN, UP TO 24 HOURS

## 2024-06-12 RX ADMIN — BUSPIRONE HYDROCHLORIDE 30 MG: 15 TABLET ORAL at 08:06

## 2024-06-12 RX ADMIN — ACETAMINOPHEN 650 MG: 325 TABLET, FILM COATED ORAL at 03:06

## 2024-06-12 RX ADMIN — ATORVASTATIN CALCIUM 80 MG: 40 TABLET, FILM COATED ORAL at 08:06

## 2024-06-12 RX ADMIN — IOHEXOL 100 ML: 350 INJECTION, SOLUTION INTRAVENOUS at 05:06

## 2024-06-12 RX ADMIN — ASPIRIN 81 MG: 81 TABLET, COATED ORAL at 08:06

## 2024-06-12 RX ADMIN — ACETAMINOPHEN 650 MG: 325 TABLET, FILM COATED ORAL at 08:06

## 2024-06-12 NOTE — H&P
[facility] Hospital Medicine History & Physical Examination       Patient Name: Edison Jacobo  MRN: [id number]  Patient Class: IP- Inpatient   Admission Date: 6/10/2024   Admitting Physician: MELI Service   Length of Stay: 1  Attending Physician: Shannan Thompson DO  Primary Care Provider: Royce Canchola MD  Face-to-Face encounter date: 06/11/2024  Code Status:Full Code   Chief Complaint: No chief complaint on file.      Screening for Social Drivers for health:  Patient screened for food insecurity, housing instability, transportation needs, utility difficulties, and interpersonal safety (select all that apply as identified as concern)  []Housing or Food  []Transportation Needs  []Utility Difficulties  []Interpersonal safety  [x]None      Patient information was obtained from patient, patient's family, past medical records and ER records.  ED records were reviewed in detail and documented below    HISTORY OF PRESENT ILLNESS:   Edison Jacobo is a 64 y.o. male who has a medical history of coronary artery disease, hypertension, BPH.       The patient presented to [facility] on 6/10/2024 with a primary complaint of left-sided weakness.  [place] post thrombolytic monitoring in ICU.      Patient was cutting grass and developed acute left-sided weakness, worse in left leg.  Patient had no associated complaints.  He had a stroke greater than 30 years ago with no residual deficits reported.    He has no other complaints.  Review of systems otherwise discussed in noted to be negative.    Physical therapist has evaluated patient in the ICU and is recommending rehabilitation.    Left-sided upper and lower extremity weakness still significantly present.    Nurses present at bedside during my evaluation.    No family present at bedside.          PAST MEDICAL HISTORY:   No past medical history on file.    PAST SURGICAL HISTORY:   No past surgical history on  file.    ALLERGIES:   Lortab [hydrocodone-acetaminophen]    FAMILY HISTORY:   Reviewed and negative    SOCIAL HISTORY:     Social History     Tobacco Use    Smoking status: Not on file    Smokeless tobacco: Not on file   Substance Use Topics    Alcohol use: Not on file        HOME MEDICATIONS:     Prior to Admission medications    Medication Sig Start Date End Date Taking? Authorizing Provider   amLODIPine (NORVASC) 5 MG tablet Take 5 mg by mouth. 4/19/24  Yes Provider, Historical   desonide (DESOWEN) 0.05 % cream APPLY 1 APPLICATION SPARINGLY TOPICALLY TO THE AFFECTED AREA OF THE FACE AND FOREHEAD ONCE A DAY 3 TIMES A WEEK 2/19/24  Yes Provider, Historical   hydrOXYzine HCL (ATARAX) 25 MG tablet TAKE 1 TABLET BY MOUTH EVERY 6 HOURS AS DIRECTED FOR BREAKTHROUGH ANXIETY 5/20/24  Yes Provider, Historical   ondansetron (ZOFRAN-ODT) 4 MG TbDL DISSOLVE ONE TABLET UNDER THE TONGUE EVERY 6 HOURS 1/17/24  Yes Provider, Historical   oxyCODONE-acetaminophen (PERCOCET) 5-325 mg per tablet Take 1 tablet by mouth every 4 to 6 hours as needed. 1/17/24  Yes Provider, Historical   aspirin (ECOTRIN) 81 MG EC tablet Take 81 mg by mouth.    Provider, Historical   bisoprolol (ZEBETA) 5 MG tablet TAKE 1 TABLET BY MOUTH EVERY DAY FOR HEART    Provider, Historical   busPIRone (BUSPAR) 30 MG Tab Take 30 mg by mouth.    Provider, Historical   cholecalciferol, vitamin D3, (VITAMIN D3) 50 mcg (2,000 unit) Cap capsule Take 25 mcg by mouth.    Provider, Historical   clopidogreL (PLAVIX) 75 mg tablet TAKE 1 TABLET BY MOUTH EVERY DAY FOR CLOT PREVENTION    Provider, Historical   cyanocobalamin (VITAMIN B-12) 1000 MCG tablet Take 1 tablet by mouth every morning.    Provider, Historical   omega-3 fatty acids/fish oil (FISH OIL-OMEGA-3 FATTY ACIDS) 300-1,000 mg capsule Take 2 g by mouth.    Provider, Historical   pravastatin (PRAVACHOL) 20 MG tablet TAKE 1 TABLET BY MOUTH DAILY WITH THE EVENING MEAL FOR CHOLESTEROL    Provider, Historical    tamsulosin (FLOMAX) 0.4 mg Cap TAKE 1 CAPSULE BY MOUTH DAILY FOR PROSTATE    Provider, Historical       REVIEW OF SYSTEMS:   Except as documented, all other systems reviewed and negative     PHYSICAL EXAM:     VITAL SIGNS: 24 HRS MIN & MAX LAST   Temp  Min: 97.5 °F (36.4 °C)  Max: 98.5 °F (36.9 °C) 98.5 °F (36.9 °C)   BP  Min: 98/65  Max: 136/89 120/71   Pulse  Min: 51  Max: 88  67   Resp  Min: 3  Max: 29 15   SpO2  Min: 87 %  Max: 97 % 95 %     General appearance: Well-developed, well-nourished male in no apparent distress.  Very pleasant.  HENT: Atraumatic head. Moist mucous membranes of oral cavity.  Eyes: Normal extraocular movements.  No conjunctivitis, no sclerae icterus appreciated.      Neck: Supple.   Lungs: Clear to auscultation bilaterally. No wheezing present.   Heart: Regular rate and rhythm.  No peripheral edema.      Abdomen: Soft, non-distended, non-tender. No rebound tenderness/guarding. Bowel sounds are normal.     Extremities: No cyanosis, clubbing, or edema.  Skin: No Rash.   Neuro:  Awake alert, no facial asymmetry appreciated, increased weakness of left upper and lower extremity with decreased hand  and motor strength present.  Generalized  Sensation intact.    Comprehension intact.  No aphasia appreciated.    Psych/mental status: Appropriate mood and affect. Responds appropriately to questions.     LABS AND IMAGING:     Recent Labs   Lab 06/10/24  1527   WBC 9.39   RBC 5.02   HGB 15.0   HCT 46.2   MCV 92.0   MCH 29.9   MCHC 32.5*   RDW 12.7      MPV 10.8*       Recent Labs   Lab 06/10/24  1527 06/11/24  0311    138   K 4.4 4.2    104   CO2 25 26   BUN 10.5 11.5   CREATININE 0.87 0.93   CALCIUM 9.2 9.4   MG  --  1.90   ALBUMIN 3.9 3.7   ALKPHOS 52 51   ALT 77* 79*   AST 43* 44*   BILITOT 0.9 1.1       Microbiology Results (last 7 days)       ** No results found for the last 168 hours. **             MRI Brain Without Contrast  Narrative: EXAMINATION:  MRI BRAIN WITHOUT  CONTRAST    CLINICAL HISTORY:  Stroke, follow up;    TECHNIQUE:  Multiplanar, multisequence MR images of the brain were obtained without the administration of intravenous contrast.    COMPARISON:  CT head dated 06/11/2024    FINDINGS:  There is no restricted diffusion, hemorrhage or edema.  Mild scattered T2/FLAIR hyperintensities in the subcortical and periventricular white matter likely represent chronic microvascular ischemic changes.    There is no mass effect or midline shift.  The basal cisterns are patent.  There is mild diffuse parenchymal volume loss.  There is no hydrocephalus or abnormal extra-axial fluid collection.  The major intracranial flow voids are patent.  There is trace scattered paranasal sinus mucosal thickening.  Impression: 1. No acute intracranial abnormality.  2. Mild chronic microvascular ischemic changes.    Electronically signed by: Tatum Jacques  Date:    06/11/2024  Time:    17:18  CT Head Without Contrast  Narrative: EXAMINATION:  CT HEAD WITHOUT CONTRAST    CLINICAL HISTORY:  Stroke, follow up;    TECHNIQUE:  Multiple axial images were obtained from the base of the brain to the vertex without contrast administration.  Sagittal and coronal reconstructions were performed. .Automatic exposure control  (AEC) is utilized to reduce patient radiation exposure.    COMPARISON:  None    FINDINGS:  There is no intracranial mass or lesion seen.  No hemorrhage is seen.  There appears to be an old area of infarct in the genu of the corpus callosum at the midline.  No evidence of acute infarct is seen..  The ventricles and basilar cisterns appear normal.  Brain parenchyma appears grossly unremarkable.    Posterior fossa appears normal.  The calvarium is intact.  There is mucosal thickening seen in the ethmoid and sphenoid sinuses..  Impression: Mucosal thickening in the paranasal sinuses as outlined above    Punctate area of old lacunar infarct in the genu of the corpus callosum    Otherwise  unremarkable    Electronically signed by: Adrián Benoit  Date:    06/11/2024  Time:    13:14      ASSESSMENT & PLAN:   --left side acute weakness  Medical history includes:  Coronary artery disease, hypotension, BPH.    Patient presents for left-sided weakness that still persists and is currently being worked up for stroke.  S/p TPA  Etiology is being investigated.    Evaluate for embolic cause, follow up on results of echocardiogram, continue cardiac monitoring to evaluate for atrial fibrillation/other cardiac arrhythmias; Evaluate for infectious etiology/metabolic changes that may mimic stroke-no evidence of infection noted.   Followup on troponins, consider cardiac consulation   Continue to monitor vitals sign and neuro checks q.4 hours, unless otherwise specified.   MRI pending.   Continue telemetry  Maintain IV access, provide gentle IV hydration   Provide supplemental oxygen if needed for oxygen saturation less than 90-92%   Monitor blood glucose and avoid hypo/hyperglycemia, sliding scale insulin as needed    Monitor for seizures-seizure precautions and fall precautions.    Appropriate prevention to be continued/medical optimization-aspirin statin therapy  Early rehabilitation with speech, PT and OT as deemed appropriate/necessary    __________________________________________________________________________  INPATIENT LIST OF MEDICATIONS     Scheduled Meds:   aspirin  81 mg Oral Daily    atorvastatin  80 mg Oral Daily    busPIRone  30 mg Oral Daily    tamsulosin  0.4 mg Oral Daily     Continuous Infusions:  PRN Meds:.  Current Facility-Administered Medications:     acetaminophen, 650 mg, Oral, Q4H PRN    bisacodyL, 10 mg, Rectal, Daily PRN    melatonin, 6 mg, Oral, Nightly PRN    ondansetron, 4 mg, Intravenous, Q8H PRN    prochlorperazine, 5 mg, Intravenous, Q6H PRN    sodium chloride 0.9%, 10 mL, Intravenous, PRN    sodium chloride 0.9%, 10 mL, Intravenous, PRN        All diagnosis and differential diagnosis  have been reviewed; assessment and plan has been documented; I have personally reviewed the labs and test results that are presently available; I have reviewed the patients medication list; I have reviewed the consulting providers response and recommendations. I have reviewed or attempted to review medical records based upon their availability.    All of the patient and family questions have been addressed and answered. Patient's is agreeable to the above stated plan. I will continue to monitor closely and make adjustments to medical management as needed.    If patient was admitted under observational status it is with my approval/permission.      Shannan Thompson,    06/11/2024

## 2024-06-12 NOTE — NURSING
Nurses Note -- 4 Eyes      6/12/2024   5:45 AM      Skin assessed during: Q Shift Change      [x] No Altered Skin Integrity Present    [x]Prevention Measures Documented      [] Yes- Altered Skin Integrity Present or Discovered   [] LDA Added if Not in Epic (Describe Wound)   [] New Altered Skin Integrity was Present on Admit and Documented in LDA   [] Wound Image Taken    Wound Care Consulted? No    Attending Nurse:  Kings Marinelli RN/Staff Member:  KIMBERLY Jones      \

## 2024-06-12 NOTE — NURSING
Nurses Note -- 4 Eyes      6/12/2024   4:00 PM      Skin assessed during: Daily Assessment      [x] No Altered Skin Integrity Present    [x]Prevention Measures Documented      [] Yes- Altered Skin Integrity Present or Discovered   [] LDA Added if Not in Epic (Describe Wound)   [] New Altered Skin Integrity was Present on Admit and Documented in LDA   [] Wound Image Taken    Wound Care Consulted? No    Attending Nurse:  KIMBERLY Jorgensen     Second RN/Staff Member:  KIMBERLY William

## 2024-06-12 NOTE — SUBJECTIVE & OBJECTIVE
Subjective:     Interval History:   No new issues overnight, remains with left sided weakness and left sided numbness.     Current Neurological Medications:     Current Facility-Administered Medications   Medication Dose Route Frequency Provider Last Rate Last Admin    acetaminophen tablet 650 mg  650 mg Oral Q4H PRN Xavier Moise MD   650 mg at 06/12/24 0815    aspirin EC tablet 81 mg  81 mg Oral Daily Griselda Pérez, NP   81 mg at 06/12/24 0812    atorvastatin tablet 80 mg  80 mg Oral Daily Joseph Mejia MD   80 mg at 06/12/24 0811    bisacodyL suppository 10 mg  10 mg Rectal Daily PRN Griselda Pérez, NP        busPIRone tablet 30 mg  30 mg Oral Daily Latrell Bishop MD   30 mg at 06/12/24 0812    melatonin tablet 6 mg  6 mg Oral Nightly PRN Latrell Bishop MD        ondansetron injection 4 mg  4 mg Intravenous Q8H PRN Latrell Bishop MD        prochlorperazine injection Soln 5 mg  5 mg Intravenous Q6H PRN Latrell Bishop MD        sodium chloride 0.9% flush 10 mL  10 mL Intravenous PRN Griselda Pérez, NP        sodium chloride 0.9% flush 10 mL  10 mL Intravenous PRN Latrell Bishop MD        tamsulosin 24 hr capsule 0.4 mg  0.4 mg Oral Daily Joseph Mejia MD           Review of Systems   Neurological:  Positive for weakness (left sided weakness) and numbness (left sided numbness). Negative for dizziness, tremors, seizures, syncope, facial asymmetry, speech difficulty, light-headedness and headaches.   All other systems reviewed and are negative.    Objective:     Vital Signs (Most Recent):  Temp: 98.1 °F (36.7 °C) (06/12/24 0800)  Pulse: 65 (06/12/24 0800)  Resp: 12 (06/12/24 0800)  BP: 116/81 (06/12/24 0800)  SpO2: 95 % (06/12/24 0800) Vital Signs (24h Range):  Temp:  [97.5 °F (36.4 °C)-98.5 °F (36.9 °C)] 98.1 °F (36.7 °C)  Pulse:  [58-88] 65  Resp:  [11-29] 12  SpO2:  [95 %-96 %] 95 %  BP: (113-128)/(71-81) 116/81     Weight: 96.2 kg (212 lb)  Body mass index is 26.5 kg/m².     Physical  Exam  Vitals and nursing note reviewed.   Constitutional:       General: He is not in acute distress.     Appearance: Normal appearance. He is not toxic-appearing.   HENT:      Head: Normocephalic.      Right Ear: Hearing normal.      Left Ear: Hearing normal.   Eyes:      General: No visual field deficit.     Extraocular Movements:      Right eye: Normal extraocular motion and no nystagmus.      Left eye: Normal extraocular motion and no nystagmus.      Pupils: Pupils are equal, round, and reactive to light.   Cardiovascular:      Rate and Rhythm: Normal rate.   Pulmonary:      Effort: Pulmonary effort is normal.   Musculoskeletal:         General: No swelling. Normal range of motion.      Cervical back: Normal range of motion.      Right lower leg: No edema.      Left lower leg: No edema.   Skin:     General: Skin is warm and dry.      Capillary Refill: Capillary refill takes less than 2 seconds.   Neurological:      General: No focal deficit present.      Mental Status: He is alert and oriented to person, place, and time.      Cranial Nerves: No dysarthria or facial asymmetry.      Sensory: Sensory deficit (decreased sensation to LUE, LLE) present.      Motor: Weakness and pronator drift (LUE, LLE drift) present. No tremor, atrophy, abnormal muscle tone or seizure activity.      Coordination: Coordination normal. Finger-Nose-Finger Test normal.   Psychiatric:         Attention and Perception: Attention normal.         Mood and Affect: Affect normal.         Speech: Speech normal.         Behavior: Behavior normal. Behavior is cooperative.          NEUROLOGICAL EXAMINATION:     MENTAL STATUS   Oriented to person, place, and time.   Speech: speech is normal     CRANIAL NERVES     CN III, IV, VI   Pupils are equal, round, and reactive to light.    GAIT AND COORDINATION      Coordination   Finger to nose coordination: normal      Significant Labs:   Recent Lab Results         06/12/24  0336        Albumin/Globulin  Ratio 1.5       Albumin 3.7       ALP 49       ALT 64       Anion Gap 6.0       AST 30       BILIRUBIN TOTAL 0.6       BUN 14.0       BUN/CREAT RATIO 14       Calcium 9.6       Chloride 105       CO2 28       Creatinine 0.99       eGFR >60       Globulin, Total 2.5       Glucose 99       Magnesium  1.90       Phosphorus Level 3.9       Potassium 4.6       PROTEIN TOTAL 6.2       Sodium 139               Significant Imaging: I have reviewed all pertinent imaging results/findings within the past 24 hours.

## 2024-06-12 NOTE — PROGRESS NOTES
[facility] - 7th Floor ICU  Neurology  Progress Note    Patient Name: Edison Jacobo  MRN: [id number]  Admission Date: 6/10/2024  Hospital Length of Stay: 2 days  Code Status: Full Code   Attending Provider: Shannan Thompson DO  Primary Care Physician: Royce Canchola MD   Principal Problem:Stroke determined by clinical assessment        Subjective:     Interval History:   No new issues overnight, remains with left sided weakness and left sided numbness.     Current Neurological Medications:     Current Facility-Administered Medications   Medication Dose Route Frequency Provider Last Rate Last Admin    acetaminophen tablet 650 mg  650 mg Oral Q4H PRN Xavier Moise MD   650 mg at 06/12/24 0815    aspirin EC tablet 81 mg  81 mg Oral Daily Griselda Pérez NP   81 mg at 06/12/24 0812    atorvastatin tablet 80 mg  80 mg Oral Daily Joseph Mejia MD   80 mg at 06/12/24 0811    bisacodyL suppository 10 mg  10 mg Rectal Daily PRN Griselda Pérez NP        busPIRone tablet 30 mg  30 mg Oral Daily Latrell Bishop MD   30 mg at 06/12/24 0812    melatonin tablet 6 mg  6 mg Oral Nightly PRN Latrell Bishop MD        ondansetron injection 4 mg  4 mg Intravenous Q8H PRN Latrell Bishop MD        prochlorperazine injection Soln 5 mg  5 mg Intravenous Q6H PRN Latrell Bishop MD        sodium chloride 0.9% flush 10 mL  10 mL Intravenous PRN Griselda Pérez NP        sodium chloride 0.9% flush 10 mL  10 mL Intravenous PRN Latrell Bishop MD        tamsulosin 24 hr capsule 0.4 mg  0.4 mg Oral Daily Joseph Mjeia MD           Review of Systems   Neurological:  Positive for weakness (left sided weakness) and numbness (left sided numbness). Negative for dizziness, tremors, seizures, syncope, facial asymmetry, speech difficulty, light-headedness and headaches.   All other systems reviewed and are negative.    Objective:     Vital Signs (Most Recent):  Temp: 98.1 °F (36.7 °C) (06/12/24 0800)  Pulse: 65  (06/12/24 0800)  Resp: 12 (06/12/24 0800)  BP: 116/81 (06/12/24 0800)  SpO2: 95 % (06/12/24 0800) Vital Signs (24h Range):  Temp:  [97.5 °F (36.4 °C)-98.5 °F (36.9 °C)] 98.1 °F (36.7 °C)  Pulse:  [58-88] 65  Resp:  [11-29] 12  SpO2:  [95 %-96 %] 95 %  BP: (113-128)/(71-81) 116/81     Weight: 96.2 kg (212 lb)  Body mass index is 26.5 kg/m².     Physical Exam  Vitals and nursing note reviewed.   Constitutional:       General: He is not in acute distress.     Appearance: Normal appearance. He is not toxic-appearing.   HENT:      Head: Normocephalic.      Right Ear: Hearing normal.      Left Ear: Hearing normal.   Eyes:      General: No visual field deficit.     Extraocular Movements:      Right eye: Normal extraocular motion and no nystagmus.      Left eye: Normal extraocular motion and no nystagmus.      Pupils: Pupils are equal, round, and reactive to light.   Cardiovascular:      Rate and Rhythm: Normal rate.   Pulmonary:      Effort: Pulmonary effort is normal.   Musculoskeletal:         General: No swelling. Normal range of motion.      Cervical back: Normal range of motion.      Right lower leg: No edema.      Left lower leg: No edema.   Skin:     General: Skin is warm and dry.      Capillary Refill: Capillary refill takes less than 2 seconds.   Neurological:      General: No focal deficit present.      Mental Status: He is alert and oriented to person, place, and time.      Cranial Nerves: No dysarthria or facial asymmetry.      Sensory: Sensory deficit (decreased sensation to LUE, LLE) present.      Motor: Weakness and pronator drift (LUE, LLE drift) present. No tremor, atrophy, abnormal muscle tone or seizure activity.      Coordination: Coordination normal. Finger-Nose-Finger Test normal.   Psychiatric:         Attention and Perception: Attention normal.         Mood and Affect: Affect normal.         Speech: Speech normal.         Behavior: Behavior normal. Behavior is cooperative.          NEUROLOGICAL  EXAMINATION:     MENTAL STATUS   Oriented to person, place, and time.   Speech: speech is normal     CRANIAL NERVES     CN III, IV, VI   Pupils are equal, round, and reactive to light.    GAIT AND COORDINATION      Coordination   Finger to nose coordination: normal      Significant Labs:   Recent Lab Results         06/12/24  0336        Albumin/Globulin Ratio 1.5       Albumin 3.7       ALP 49       ALT 64       Anion Gap 6.0       AST 30       BILIRUBIN TOTAL 0.6       BUN 14.0       BUN/CREAT RATIO 14       Calcium 9.6       Chloride 105       CO2 28       Creatinine 0.99       eGFR >60       Globulin, Total 2.5       Glucose 99       Magnesium  1.90       Phosphorus Level 3.9       Potassium 4.6       PROTEIN TOTAL 6.2       Sodium 139               Significant Imaging: I have reviewed all pertinent imaging results/findings within the past 24 hours.  Assessment and Plan:       Left-sided weakness  Stroke   - presented with left hemisensory loss and LSW  - Stroke RF: CAD, HTN  - Intervention: TPA on  6/10/2024 at 1130 am.  - Etiology: TBD    Stroke workup:  -CTh: negative    -CTA h/n: >50% Calcification Marco Carotid Arteries.    -MRI brain:  negative  -ECHO:  Normal left atrial size.Study is negative for shunt.ejection fraction of 55 - 60%.  -LDL:  89  -TSH:   0.955  -home medications include: ASA 81 mg  and Plavix      Plan:  -S/p TNK    -continue to follow workup.  -permissive HTN for now ... SBP less than 180  -CT chest w/ contrast, MRI C spine w/o  -continue therapy efforts   -continue aspirin 81 mg and atorvastatin 80 mg     Further recommendations to follow from MD    VTE Risk Mitigation (From admission, onward)           Ordered     Reason for No Pharmacological VTE Prophylaxis  Once        Comments: S/P TPA   Question:  Reasons:  Answer:  Physician Provided (leave comment)    06/10/24 1532     IP VTE HIGH RISK PATIENT  Once         06/10/24 1532     Place sequential compression device  Until discontinued          06/10/24 1532                    Halley Hartley NP  Neurology  [facility] - 7th Floor ICU

## 2024-06-12 NOTE — PT/OT/SLP PROGRESS
Occupational Therapy   Treatment    Name: Edison Jacobo  MRN: [id number]  Admitting Diagnosis:  Stroke determined by clinical assessment       Recommendations:     Recommended therapy intensity at discharge: High Intensity Therapy   Discharge Equipment Recommendations:  to be determined by next level of care      Assessment:     Edison Jacobo is a 64 y.o. male with a medical diagnosis of CVA s/p TNK, MRI negative.  Further workup pending per RN.  He presents with continued weakness in LUE, 3-/5 overall, same as yesterday, numbness remains.  Excellent effort.  Mod A functional mobility, min A to pivot.  Discussed with RN to assist with pivoting to bed/bedside chair/toilet.  Excellent motivation and good family support.  Anticipate high intensity therapy at discharge.  Performance deficits affecting function are weakness, impaired self care skills, impaired functional mobility, gait instability, impaired balance, visual deficits, decreased coordination, decreased upper extremity function, decreased lower extremity function, impaired fine motor.     Rehab Prognosis:  Good; patient would benefit from acute skilled OT services to address these deficits and reach maximum level of function.       Plan:     Patient to be seen 6 x/week to address the above listed problems via self-care/home management, therapeutic exercises, neuromuscular re-education, therapeutic activities  Plan of Care Expires:    Plan of Care Reviewed with: spouse, patient, family    Subjective     Pain/Comfort:  Pain Rating 1: 0/10    Objective:     Communicated with: RN prior to session.  Patient found HOB elevated with  (vital monitoring, 2L NC (ok to remove per RN)) upon OT entry to room.    General Precautions: Standard, fall (<180)    Orthopedic Precautions:N/A  Braces: N/A  Respiratory Status: Room air  Vital Signs: 139/93 HR 67   144/91 HR 59  Intermittent dizziness, BP and HR stable    Occupational Performance:     Functional  Mobility/Transfers:  Patient completed Sit <> Stand Transfer with minimum assistance  with  hand-held assist   Patient completed Bed <> Chair Transfer using Squat Pivot technique with minimum assistance with gait betl  Patient completed Toilet Transfer Squat Pivot technique with minimum assistance with  grab bars and gait belt  Functional Mobility: min A transfers towards R side, excellent effort in participation; Mod A functional mobility/ambulation with HHA and gait belt    Activities of Daily Living:  Toileting: minimum assistance pivot transfer  UB dressing: min A     Therapeutic Exercise:  Pt participated in 3 wall crawls LUE in standing, min A static standing balance.  AAROM for shoulder from 70 to end range.  Excellent effort but quick to fatigue.      Patient Education:  Patient provided with verbal education education regarding OT role/goals/POC, fall prevention, and Discharge/DME recommendations.  Understanding was verbalized, however additional teaching warranted.      Patient left up in chair with all lines intact, RN notified, and family present.    GOALS:   Multidisciplinary Problems       Occupational Therapy Goals          Problem: Occupational Therapy    Goal Priority Disciplines Outcome Interventions   Occupational Therapy Goal     OT, PT/OT Progressing    Description: Goals to be met by: 7/11/24     Patient will increase functional independence with ADLs by performing:    UE Dressing with Supervision.  LE Dressing with Supervision.  Grooming while standing at sink with Supervision.  Toileting from toilet with Supervision for hygiene and clothing management.   Toilet transfer to toilet with Supervision.  Increased functional strength to 4/5 through therEx, neuromuscular re-ed  Pt will visually scan L side of environment with no verbal cues.                         Time Tracking:     OT Date of Treatment:    OT Start Time: 1010  OT Stop Time: 1040  OT Total Time (min): 30 min    Billable  Minutes:Self Care/Home Management 2    OT/OLYA: OT     Number of OLYA visits since last OT visit: 1    6/12/2024

## 2024-06-12 NOTE — ASSESSMENT & PLAN NOTE
Stroke   - presented with left hemisensory loss and LSW  - Stroke RF: CAD, HTN  - Intervention: TPA on  6/10/2024 at 1130 am.  - Etiology: TBD    Stroke workup:  -CTh: negative    -CTA h/n: >50% Calcification Marco Carotid Arteries.    -MRI brain:  negative  -ECHO:  Normal left atrial size.Study is negative for shunt.ejection fraction of 55 - 60%.  -LDL:  89  -TSH:   0.955  -home medications include: ASA 81 mg  and Plavix      Plan:  -S/p TNK    -continue to follow workup.  -permissive HTN for now ... SBP less than 180  -CT chest w/ contrast, MRI C spine w/o  -continue therapy efforts   -continue aspirin 81 mg and atorvastatin 80 mg

## 2024-06-12 NOTE — PT/OT/SLP PROGRESS
Physical Therapy Treatment    Patient Name:  Edison Jacobo   MRN:  [id number]    Recommendations:     Discharge therapy intensity: High Intensity Therapy   Discharge Equipment Recommendations: to be determined by next level of care  Barriers to discharge:  medical dx, impaired mobility, decreased independence     Assessment:     Edison Jacobo is a 64 y.o. male admitted with a medical diagnosis of L sided weakness, numbness and tingling; s/p tPA.    He presents with the following impairments/functional limitations: weakness, gait instability, decreased upper extremity function, impaired endurance, impaired balance, decreased lower extremity function, impaired self care skills, impaired functional mobility.    Rehab Prognosis: Good; patient would benefit from acute skilled PT services to address these deficits and reach maximum level of function.    Recent Surgery: * No surgery found *      Plan:     During this hospitalization, patient would benefit from acute PT services 6 x/week to address the identified rehab impairments via gait training, therapeutic activities, therapeutic exercises, neuromuscular re-education and progress toward the following goals:    Plan of Care Expires:  07/11/24    Subjective     Chief Complaint: n/a  Patient/Family Comments/goals: to get stronger   Pain/Comfort:  Pain Rating 1: 0/10      Objective:     Communicated with NSG prior to session.  Patient found HOB elevated with blood pressure cuff, pulse ox (continuous), telemetry upon PT entry to room.     General Precautions: Standard, fall  Orthopedic Precautions: N/A  Braces: N/A  Respiratory Status: Nasal cannula, flow 2 L/min-- RN ok'd to remove for mobility, SpO2 remained WNL   Blood Pressure: 139/93 prior to ax, 144/91 after initial mobility + pt with complaints of LH/dizzy  HR: 59-92  Skin Integrity: Visible skin intact      Functional Mobility:  Bed Mobility:     Supine to Sit: stand by assistance  Transfers:     Sit to Stand:   minimum assistance with hand-held assist + rail  Bed to/from Chair: minimum assistance with  no AD  using  Squat Pivot; x3 trials   Gait: pt amb approx 10 ft with use of R bed rail and Laya; pt demo a step through pattern; increased time req for initiation of R LE swing phase; L knee blocked in stance phase, no overt buckling but did require TC for full knee extension    Therapeutic Activities/Exercises:  With pt holding on to rail, pt performs x5 marches of LLE to improve hip and knee flexion during gait   Standing UE activity performed at wall; Laya; requires VC/TC for equal weight distribution between B LE as well as for full knee extension of LLE    Patient requires rest breaks btw all activity.       Education:  Patient and family were provided with verbal education  regarding PT role/goals/POC, fall prevention, safety awareness, and discharge/DME recommendations.  Understanding was verbalized.     Patient left up in chair with all lines intact, call button in reach, RN notified, and fmaily present    GOALS:   Multidisciplinary Problems       Physical Therapy Goals          Problem: Physical Therapy    Goal Priority Disciplines Outcome Goal Variances Interventions   Physical Therapy Goal     PT, PT/OT Progressing     Description: Goals to be met by: 24     Patient will increase functional independence with mobility by performin. Supine to sit with [place]  2. Sit to supine with [place]  3. Sit to stand transfer with Stand-by Assistance  4. Gait  x 150 feet with Stand-by Assistance using LRAD.   5. Increased functional strength to 5/5 for L LE.                         Time Tracking:     PT Received On: 24  PT Start Time: 1012     PT Stop Time: 1040  PT Total Time (min): 28 min     Billable Minutes: Gait Training 1 and Therapeutic Activity 1    Treatment Type: Treatment  PT/PTA: PT     Number of PTA visits since last PT visit: 2024

## 2024-06-12 NOTE — PROGRESS NOTES
[facility]  Hospital Medicine Progress Note      Chief Complaint:  Left-sided weakness       HPI: (personally reviewed by me and is documented from initial H&P)   64 y.o. male who has a medical history of coronary artery disease, hypertension, BPH.       The patient presented to [facility] on 6/10/2024 with a primary complaint of left-sided weakness.  Patient transferred from [facility] for post thrombolytic monitoring in ICU.       Patient was cutting grass and developed acute left-sided weakness, worse in left leg.  Patient had no associated complaints.  He had a stroke greater than 30 years ago with no residual deficits reported.    He has no other complaints.  Review of systems otherwise discussed in noted to be negative.    Physical therapist has evaluated patient in the ICU and is recommending rehabilitation.    Left-sided upper and lower extremity weakness still significantly present.    Nurses present at bedside during my evaluation.    No family present at bedside.          Interval History:        Family members present at bedside.  Patient has worked well with therapy.  No new complaints or concerns noted.  MRI has been obtained but showed no acute abnormalities suggestive of acute ischemic event.  CTA of the chest showed no aortic abnormality and no pulmonary embolism however there is bilateral bibasilar atelectasis versus developing infiltrates some of which is consistent with COPD.  Patient denies any chest pain or back pain.    Objective Assessment:  Physical Exam      Vital signs have been personally reviewed by me   General: Appears comfortable, no acute distress.  Sitting in chair on side of the bed  Neuro: awake, alert, oriented.  Comprehension intact.  Still with left upper and lower extremity weakness that has not worsened.    Integumentary: Warm, dry, intact.  Musculoskeletal: Purposeful movement noted.     Respiratory: No accessory muscle use. Breath  sounds are equal.  Cardiovascular: Regular rate.       VITAL SIGNS: 24 HRS MIN & MAX LAST   Temp  Min: 97.8 °F (36.6 °C)  Max: 98.5 °F (36.9 °C) 98.1 °F (36.7 °C)   BP  Min: 113/73  Max: 125/77 122/78   Pulse  Min: 58  Max: 88  84   Resp  Min: 12  Max: 22 20   SpO2  Min: 95 %  Max: 98 % 98 %     CTA Chest Aorta Non Coronary  Narrative: EXAMINATION:  CTA CHEST AORTA NON CORONARY    CLINICAL HISTORY:  Aortic aneurysm, known or suspected;    TECHNIQUE:  Low dose axial images, sagittal and coronal reformations were obtained from the thoracic inlet to the lung bases following the IV administration of contrast..  Contrast timing was optimized to evaluate the thoracic aorta and pulmonary arteries.  MIP images were performed.  Automated exposure control was utilized    COMPARISON:  None    FINDINGS:  There are changes consistent with COPD and emphysema in bilaterally.  There is bibasilar atelectasis versus developing infiltrates.  There is some bronchiectasis in the bases bilaterally.  No pleural effusion is seen.  No thickening is seen.  Heart appears normal.  Abnormal mediastinal seen.    Vascular images: The thoracic aorta is normal caliber.  No aneurysm is seen.  No dissection is seen.  Great vessels appear normal.  Upper abdominal aorta appears    No pulmonary embolism is seen.  Upper abdomen appears.  Impression: No aortic abnormality    Pulmonary embolism    Findings seen consistent with COPD bilaterally with bibasilar atelectasis versus developing infiltrates    Electronically signed by: Adrián Benoit  Date:    06/12/2024  Time:    17:44    Recent Labs   Lab 06/10/24  1527   WBC 9.39   RBC 5.02   HGB 15.0   HCT 46.2   MCV 92.0   MCH 29.9   MCHC 32.5*   RDW 12.7      MPV 10.8*       Recent Labs   Lab 06/10/24  1527 06/11/24  0311 06/12/24  0336    138 139   K 4.4 4.2 4.6    104 105   CO2 25 26 28   BUN 10.5 11.5 14.0   CREATININE 0.87 0.93 0.99   CALCIUM 9.2 9.4 9.6   MG  --  1.90 1.90   ALBUMIN  3.9 3.7 3.7   ALKPHOS 52 51 49   ALT 77* 79* 64*   AST 43* 44* 30   BILITOT 0.9 1.1 0.6     Microbiology Results (last 7 days)       ** No results found for the last 168 hours. **               Medications for Hospital Course         Scheduled Med:   aspirin  81 mg Oral Daily    atorvastatin  80 mg Oral Daily    busPIRone  30 mg Oral Daily    tamsulosin  0.4 mg Oral Daily      Continuous Infusions:       PRN Meds:    Current Facility-Administered Medications:     acetaminophen, 650 mg, Oral, Q4H PRN    bisacodyL, 10 mg, Rectal, Daily PRN    melatonin, 6 mg, Oral, Nightly PRN    ondansetron, 4 mg, Intravenous, Q8H PRN    prochlorperazine, 5 mg, Intravenous, Q6H PRN    sodium chloride 0.9%, 10 mL, Intravenous, PRN    sodium chloride 0.9%, 10 mL, Intravenous, PRN     Assessment and Plan      Left-sided upper and lower extremity weakness  Coronary artery disease   Hypotension   BPH  History of CVA without residual deficits  Above present on admission         Anticipated discharge and Disposition when medically stable:  Rehabilitation    Therapy:  PT/OT    Nutrition: Cardiac diet as tolerated     ___________________________________________________________________________________________________________________________________      Left-sided weakness, findings consistent with cerebrovascular accident however MRI and CT of the head is negative.  MRI of the C-spine is pending.  Consider MRA ?  follow up with recommendations of neurologist  Continue supportive care  Continue checking vital signs q4hrs.  Etiology is being investigated.    Evaluate for embolic cause, follow up on results of echocardiogram, continue cardiac monitoring to evaluate for atrial fibrillation/other cardiac arrhythmias; Evaluate for infectious etiology/metabolic changes that may mimic stroke-no evidence of infection noted.   Followup on troponins, consider cardiac consulation   Continue to monitor vitals sign and neuro checks q.4 hours, unless  otherwise specified.     Continue telemetry  Maintain IV access, provide gentle IV hydration   Provide supplemental oxygen if needed for oxygen saturation less than 90-92%   Monitor blood glucose and avoid hypo/hyperglycemia, sliding scale insulin as needed     Nurse notified to page me if any changes occur     DVT prophylaxis initiated       Consults:   I have personally reviewed the specialist documentation and/or have spoken to the specialist with regard to the care of this patient; recommendations are noted above.     _______________________________________________________________________________________________________________________________      I have spent 40 minutes on the day of the visit; time spent includes face to face time and non-face to face time preparing to see the patient (eg, review of tests), independently reviewing and interpreting medical records, both past and current; documenting clinical information in the electronic or other health record, and communicating results to the patient/family/caregiver and care coordinator and nursing team.      All diagnosis and differential diagnosis have been reviewed,  interpreted and communicated appropriately to care team. assessment and plan has been documented; I have personally reviewed the labs and test results that are presently available and pertinent to this hospital course; I have reviewed medical records based upon their availability.    All of the patient's questions have been  addressed and answered. Patient's is agreeable to the above stated plan.   I will continue to monitor closely and make adjustments to medical management as needed.    Shannan Thompson, DO   06/12/2024     This note was created with the assistance of Dragon voice recognition software. There may be transcription errors as a result of using this technology however minimal. Effort has been made to assure accuracy of transcription but any obvious errors or omissions should be  clarified with the author of the document.

## 2024-06-13 LAB
ALBUMIN SERPL-MCNC: 3.6 G/DL (ref 3.4–4.8)
ALBUMIN/GLOB SERPL: 1.2 RATIO (ref 1.1–2)
ALP SERPL-CCNC: 50 UNIT/L (ref 40–150)
ALT SERPL-CCNC: 59 UNIT/L (ref 0–55)
ANION GAP SERPL CALC-SCNC: 8 MEQ/L
AST SERPL-CCNC: 27 UNIT/L (ref 5–34)
BILIRUB SERPL-MCNC: 0.4 MG/DL
BUN SERPL-MCNC: 10.5 MG/DL (ref 8.4–25.7)
CALCIUM SERPL-MCNC: 9.3 MG/DL (ref 8.8–10)
CHLORIDE SERPL-SCNC: 104 MMOL/L (ref 98–107)
CO2 SERPL-SCNC: 27 MMOL/L (ref 23–31)
CREAT SERPL-MCNC: 0.89 MG/DL (ref 0.73–1.18)
CREAT/UREA NIT SERPL: 12
CRP SERPL-MCNC: 1.1 MG/L
ERYTHROCYTE [SEDIMENTATION RATE] IN BLOOD: 7 MM/HR (ref 0–15)
GFR SERPLBLD CREATININE-BSD FMLA CKD-EPI: >60 ML/MIN/1.73/M2
GLOBULIN SER-MCNC: 2.9 GM/DL (ref 2.4–3.5)
GLUCOSE SERPL-MCNC: 113 MG/DL (ref 82–115)
MAGNESIUM SERPL-MCNC: 1.9 MG/DL (ref 1.6–2.6)
PHOSPHATE SERPL-MCNC: 3.1 MG/DL (ref 2.3–4.7)
POTASSIUM SERPL-SCNC: 3.8 MMOL/L (ref 3.5–5.1)
PROT SERPL-MCNC: 6.5 GM/DL (ref 5.8–7.6)
SODIUM SERPL-SCNC: 139 MMOL/L (ref 136–145)

## 2024-06-13 PROCEDURE — 36415 COLL VENOUS BLD VENIPUNCTURE: CPT

## 2024-06-13 PROCEDURE — 83735 ASSAY OF MAGNESIUM: CPT

## 2024-06-13 PROCEDURE — 63600175 PHARM REV CODE 636 W HCPCS: Performed by: INTERNAL MEDICINE

## 2024-06-13 PROCEDURE — 25000003 PHARM REV CODE 250

## 2024-06-13 PROCEDURE — 25000003 PHARM REV CODE 250: Performed by: INTERNAL MEDICINE

## 2024-06-13 PROCEDURE — 86140 C-REACTIVE PROTEIN: CPT | Performed by: INTERNAL MEDICINE

## 2024-06-13 PROCEDURE — 97116 GAIT TRAINING THERAPY: CPT

## 2024-06-13 PROCEDURE — 86235 NUCLEAR ANTIGEN ANTIBODY: CPT | Performed by: INTERNAL MEDICINE

## 2024-06-13 PROCEDURE — 80053 COMPREHEN METABOLIC PANEL: CPT

## 2024-06-13 PROCEDURE — 99233 SBSQ HOSP IP/OBS HIGH 50: CPT | Mod: ,,, | Performed by: PSYCHIATRY & NEUROLOGY

## 2024-06-13 PROCEDURE — 36415 COLL VENOUS BLD VENIPUNCTURE: CPT | Performed by: INTERNAL MEDICINE

## 2024-06-13 PROCEDURE — 85652 RBC SED RATE AUTOMATED: CPT | Performed by: INTERNAL MEDICINE

## 2024-06-13 PROCEDURE — 21400001 HC TELEMETRY ROOM

## 2024-06-13 PROCEDURE — 97535 SELF CARE MNGMENT TRAINING: CPT

## 2024-06-13 PROCEDURE — 97110 THERAPEUTIC EXERCISES: CPT

## 2024-06-13 PROCEDURE — 84100 ASSAY OF PHOSPHORUS: CPT

## 2024-06-13 RX ORDER — HYDROXYZINE HYDROCHLORIDE 25 MG/1
25 TABLET, FILM COATED ORAL 3 TIMES DAILY PRN
Status: DISCONTINUED | OUTPATIENT
Start: 2024-06-13 | End: 2024-06-17 | Stop reason: HOSPADM

## 2024-06-13 RX ORDER — ATORVASTATIN CALCIUM 40 MG/1
80 TABLET, FILM COATED ORAL NIGHTLY
Status: DISCONTINUED | OUTPATIENT
Start: 2024-06-14 | End: 2024-06-17 | Stop reason: HOSPADM

## 2024-06-13 RX ORDER — ENOXAPARIN SODIUM 100 MG/ML
40 INJECTION SUBCUTANEOUS EVERY 24 HOURS
Status: DISCONTINUED | OUTPATIENT
Start: 2024-06-13 | End: 2024-06-17 | Stop reason: HOSPADM

## 2024-06-13 RX ORDER — BUTALBITAL, ACETAMINOPHEN AND CAFFEINE 50; 325; 40 MG/1; MG/1; MG/1
1 TABLET ORAL EVERY 4 HOURS PRN
Status: DISCONTINUED | OUTPATIENT
Start: 2024-06-13 | End: 2024-06-17 | Stop reason: HOSPADM

## 2024-06-13 RX ADMIN — ATORVASTATIN CALCIUM 80 MG: 40 TABLET, FILM COATED ORAL at 08:06

## 2024-06-13 RX ADMIN — ASPIRIN 81 MG: 81 TABLET, COATED ORAL at 08:06

## 2024-06-13 RX ADMIN — HYDROXYZINE HYDROCHLORIDE 25 MG: 25 TABLET, FILM COATED ORAL at 08:06

## 2024-06-13 RX ADMIN — BUTALBITAL, ACETAMINOPHEN, AND CAFFEINE 1 TABLET: 325; 50; 40 TABLET ORAL at 01:06

## 2024-06-13 RX ADMIN — BUSPIRONE HYDROCHLORIDE 30 MG: 15 TABLET ORAL at 08:06

## 2024-06-13 RX ADMIN — Medication 6 MG: at 08:06

## 2024-06-13 RX ADMIN — ENOXAPARIN SODIUM 40 MG: 40 INJECTION SUBCUTANEOUS at 06:06

## 2024-06-13 RX ADMIN — ACETAMINOPHEN 650 MG: 325 TABLET, FILM COATED ORAL at 08:06

## 2024-06-13 NOTE — PROGRESS NOTES
[facility]  Hospital Medicine Progress Note        Chief Complaint:  Left-sided weakness         HPI: (personally reviewed by me and is documented from initial H&P)   64 y.o. male who has a medical history of coronary artery disease, hypertension, BPH.       The patient presented to [facility] on 6/10/2024 with a primary complaint of left-sided weakness.  Patient transferred from [facility] for post thrombolytic monitoring in ICU.       Patient was cutting grass and developed acute left-sided weakness, worse in left leg.  Patient had no associated complaints.  He had a stroke greater than 30 years ago with no residual deficits reported.    He has no other complaints.  Review of systems otherwise discussed in noted to be negative.    Physical therapist has evaluated patient in the ICU and is recommending rehabilitation.    Left-sided upper and lower extremity weakness still significantly present.    Nurses present at bedside during my evaluation.    No family present at bedside.     MRI has been obtained but showed no acute abnormalities suggestive of acute ischemic event.  CTA of the chest showed no aortic abnormality and no pulmonary embolism however there is bilateral bibasilar atelectasis versus developing infiltrates some of which is consistent with COPD.        Interval History:          Patient c/o HA , left sided weakness/heaviness feels worse today   Patient denies any chest pain or back pain.  Await mri c/spine result      Objective Assessment:  Physical Exam       Vital signs have been personally reviewed by me   General: Appears comfortable, no acute distress.  Sitting in chair on side of the bed  Neuro: awake, alert, oriented.  Comprehension intact.  Still with left upper and lower extremity weakness that has not worsened.    Integumentary: Warm, dry, intact.  Musculoskeletal: Purposeful movement noted.     Respiratory: No accessory muscle use. Breath sounds are  equal.  Cardiovascular: Regular rate.         Assessment and Plan       Left-sided upper and lower extremity weakness ? Cause . Mri brain negative for stroke   Coronary artery disease   Hypotension   BPH  History of CVA without residual deficits  Above present on admission         Anticipated discharge and Disposition when medically stable:  Rehabilitation     Therapy:  PT/OT     Nutrition: Cardiac diet as tolerated      ___________________________________________________________________________________________________________________________________        Left-sided weakness, findings consistent with cerebrovascular accident however MRI and CT of the head is negative.  MRI of the C-spine is pending.   follow up with recommendations of neurologist  Send autoimmune workup, esr, crp   Continue supportive care  Continue checking vital signs q4hrs.  Etiology is being investigated.    Evaluate for embolic cause, follow up on results of echocardiogram, continue cardiac monitoring to evaluate for atrial fibrillation/other cardiac arrhythmias; Evaluate for infectious etiology/metabolic changes that may mimic stroke-no evidence of infection noted.   Followup on troponins, consider cardiac consulation   Continue to monitor vitals sign and neuro checks q.4 hours, unless otherwise specified.   Prn fioricet for HA  Prn hydroxyzine for anxiety   Continue telemetry  Maintain IV access, provide gentle IV hydration   Provide supplemental oxygen if needed for oxygen saturation less than 90-92%   Monitor blood glucose and avoid hypo/hyperglycemia, sliding scale insulin as needed     Nurse notified to page me if any changes occur      DVT prophylaxis initiated         Consults:   I have personally reviewed the specialist documentation and/or have spoken to the specialist with regard to the care of this patient; recommendations are noted above.           All diagnosis and differential diagnosis have been reviewed,  interpreted and  communicated appropriately to care team. assessment and plan has been documented; I have personally reviewed the labs and test results that are presently available and pertinent to this hospital course; I have reviewed medical records based upon their availability.     All of the patient's questions have been  addressed and answered. Patient's is agreeable to the above stated plan.   I will continue to monitor closely and make adjustments to medical management as needed.         VITAL SIGNS: 24 HRS MIN & MAX LAST   Temp  Min: 97.9 °F (36.6 °C)  Max: 98.1 °F (36.7 °C) 97.9 °F (36.6 °C)   BP  Min: 122/78  Max: 140/96 (!) 140/96   Pulse  Min: 60  Max: 101  70   Resp  Min: 13  Max: 33 (!) 23   SpO2  Min: 98 %  Max: 98 % 98 %     I have reviewed the following labs:  Recent Labs   Lab 06/10/24  1527   WBC 9.39   RBC 5.02   HGB 15.0   HCT 46.2   MCV 92.0   MCH 29.9   MCHC 32.5*   RDW 12.7      MPV 10.8*     Recent Labs   Lab 06/11/24  0311 06/12/24  0336 06/13/24  0238    139 139   K 4.2 4.6 3.8    105 104   CO2 26 28 27   BUN 11.5 14.0 10.5   CREATININE 0.93 0.99 0.89   CALCIUM 9.4 9.6 9.3   MG 1.90 1.90 1.90   ALBUMIN 3.7 3.7 3.6   ALKPHOS 51 49 50   ALT 79* 64* 59*   AST 44* 30 27   BILITOT 1.1 0.6 0.4     Microbiology Results (last 7 days)       ** No results found for the last 168 hours. **             See below for Radiology    Scheduled Med:   aspirin  81 mg Oral Daily    [START ON 6/14/2024] atorvastatin  80 mg Oral QHS    busPIRone  30 mg Oral Daily    enoxparin  40 mg Subcutaneous Q24H (prophylaxis, 1700)    tamsulosin  0.4 mg Oral Daily      Continuous Infusions:     PRN Meds:    Current Facility-Administered Medications:     acetaminophen, 650 mg, Oral, Q4H PRN    bisacodyL, 10 mg, Rectal, Daily PRN    melatonin, 6 mg, Oral, Nightly PRN    ondansetron, 4 mg, Intravenous, Q8H PRN    prochlorperazine, 5 mg, Intravenous, Q6H PRN    sodium chloride 0.9%, 10 mL, Intravenous, PRN    sodium chloride  0.9%, 10 mL, Intravenous, PRN     Assessment/Plan:      VTE prophylaxis:     Patient condition:  Stable/Fair/Guarded/ Serious/ Critical    Anticipated discharge and Disposition:         All diagnosis and differential diagnosis have been reviewed; assessment and plan has been documented; I have personally reviewed the labs and test results that are presently available; I have reviewed the patients medication list; I have reviewed the consulting providers response and recommendations. I have reviewed or attempted to review medical records based upon their availability    All of the patient's questions have been  addressed and answered. Patient's is agreeable to the above stated plan. I will continue to monitor closely and make adjustments to medical management as needed.  _____________________________________________________________________    Nutrition Status:    Radiology:  I have personally reviewed the following imaging and agree with the radiologist.     CTA Chest Aorta Non Coronary  Narrative: EXAMINATION:  CTA CHEST AORTA NON CORONARY    CLINICAL HISTORY:  Aortic aneurysm, known or suspected;    TECHNIQUE:  Low dose axial images, sagittal and coronal reformations were obtained from the thoracic inlet to the lung bases following the IV administration of contrast..  Contrast timing was optimized to evaluate the thoracic aorta and pulmonary arteries.  MIP images were performed.  Automated exposure control was utilized    COMPARISON:  None    FINDINGS:  There are changes consistent with COPD and emphysema in bilaterally.  There is bibasilar atelectasis versus developing infiltrates.  There is some bronchiectasis in the bases bilaterally.  No pleural effusion is seen.  No thickening is seen.  Heart appears normal.  Abnormal mediastinal seen.    Vascular images: The thoracic aorta is normal caliber.  No aneurysm is seen.  No dissection is seen.  Great vessels appear normal.  Upper abdominal aorta appears    No  pulmonary embolism is seen.  Upper abdomen appears.  Impression: No aortic abnormality    Pulmonary embolism    Findings seen consistent with COPD bilaterally with bibasilar atelectasis versus developing infiltrates    Electronically signed by: Adrián Benoit  Date:    06/12/2024  Time:    17:44      Radha Arciniega MD  Department of Hospital Medicine   [facility]   06/13/2024

## 2024-06-13 NOTE — PROGRESS NOTES
[facility] - 7th Floor ICU  Neurology  Progress Note    Patient Name: Edison Jacobo  MRN: [id number]  Admission Date: 6/10/2024  Hospital Length of Stay: 3 days  Code Status: Full Code   Attending Provider: Shannan Thompson DO  Primary Care Physician: Royce Canchola MD   Principal Problem:Stroke determined by clinical assessment        Subjective:     Interval History:   No new issues, reports very minimal improvement in left sided weakness, he is asking if he is going to have to go to rehab.  CT Chest negative for dissection or aneurysm.    Current Neurological Medications:     Current Facility-Administered Medications   Medication Dose Route Frequency Provider Last Rate Last Admin    acetaminophen tablet 650 mg  650 mg Oral Q4H PRN Xavier Moise MD   650 mg at 06/13/24 0806    aspirin EC tablet 81 mg  81 mg Oral Daily Griselda Pérez, NP   81 mg at 06/13/24 0806    atorvastatin tablet 80 mg  80 mg Oral Daily Joseph Mejia MD   80 mg at 06/13/24 0806    bisacodyL suppository 10 mg  10 mg Rectal Daily PRN Griselda Pérez NP        busPIRone tablet 30 mg  30 mg Oral Daily Latrell Bishop MD   30 mg at 06/13/24 0806    melatonin tablet 6 mg  6 mg Oral Nightly PRN Latrell Bishop MD        ondansetron injection 4 mg  4 mg Intravenous Q8H PRN Latrell Bishop MD        prochlorperazine injection Soln 5 mg  5 mg Intravenous Q6H PRN Latrell Bishop MD        sodium chloride 0.9% flush 10 mL  10 mL Intravenous PRN Griselda Pérez, NP        sodium chloride 0.9% flush 10 mL  10 mL Intravenous PRN Latrell Bishop MD        tamsulosin 24 hr capsule 0.4 mg  0.4 mg Oral Daily Joseph Mejia MD           Review of Systems  Neurological:  Positive for weakness (left sided weakness) and numbness (left sided numbness). Negative for dizziness, tremors, seizures, syncope, facial asymmetry, speech difficulty, light-headedness and headaches.   All other systems reviewed and are  negative.  Objective:     Vital Signs (Most Recent):  Temp: 97.9 °F (36.6 °C) (06/13/24 0800)  Pulse: 79 (06/13/24 1000)  Resp: 18 (06/13/24 1000)  BP: (!) 140/96 (06/13/24 0900)  SpO2: 98 % (06/13/24 0800) Vital Signs (24h Range):  Temp:  [97.9 °F (36.6 °C)-98.1 °F (36.7 °C)] 97.9 °F (36.6 °C)  Pulse:  [] 79  Resp:  [13-33] 18  SpO2:  [97 %-98 %] 98 %  BP: (118-140)/(73-96) 140/96     Weight: 96.2 kg (212 lb)  Body mass index is 26.5 kg/m².     Physical Exam   Vitals and nursing note reviewed.   Constitutional:       General: He is not in acute distress.     Appearance: Normal appearance. He is not toxic-appearing.   HENT:      Head: Normocephalic.      Right Ear: Hearing normal.      Left Ear: Hearing normal.   Eyes:      General: No visual field deficit.     Extraocular Movements:      Right eye: Normal extraocular motion and no nystagmus.      Left eye: Normal extraocular motion and no nystagmus.      Pupils: Pupils are equal, round, and reactive to light.   Cardiovascular:      Rate and Rhythm: Normal rate.   Pulmonary:      Effort: Pulmonary effort is normal.   Musculoskeletal:         General: No swelling. Normal range of motion.      Cervical back: Normal range of motion.      Right lower leg: No edema.      Left lower leg: No edema.   Skin:     General: Skin is warm and dry.      Capillary Refill: Capillary refill takes less than 2 seconds.   Neurological:      General: No focal deficit present.      Mental Status: He is alert and oriented to person, place, and time.      Cranial Nerves: No dysarthria or facial asymmetry.      Sensory: Sensory deficit (decreased sensation to LUE, LLE) present.      Motor: Weakness and pronator drift (LUE, LLE drift) present. No tremor, atrophy, abnormal muscle tone or seizure activity.      Coordination: Coordination normal. Finger-Nose-Finger Test normal.   Psychiatric:         Attention and Perception: Attention normal.         Mood and Affect: Affect normal.          Speech: Speech normal.         Behavior: Behavior normal. Behavior is cooperative.            NEUROLOGICAL EXAMINATION:      MENTAL STATUS   Oriented to person, place, and time.   Speech: speech is normal      CRANIAL NERVES      CN III, IV, VI   Pupils are equal, round, and reactive to light.     GAIT AND COORDINATION      Coordination   Finger to nose coordination: normal          Significant Labs:   Recent Lab Results         06/13/24  0238        Albumin/Globulin Ratio 1.2       Albumin 3.6       ALP 50       ALT 59       Anion Gap 8.0       AST 27       BILIRUBIN TOTAL 0.4       BUN 10.5       BUN/CREAT RATIO 12       Calcium 9.3       Chloride 104       CO2 27       Creatinine 0.89       eGFR >60       Globulin, Total 2.9       Glucose 113       Magnesium  1.90       Phosphorus Level 3.1       Potassium 3.8       PROTEIN TOTAL 6.5       Sodium 139               Significant Imaging: I have reviewed all pertinent imaging results/findings within the past 24 hours.  Assessment and Plan:   Left-sided weakness  Stroke   - presented with left hemisensory loss and LSW  - Stroke RF: CAD, HTN  - Intervention: TPA on  6/10/2024 at 1130 am.      Stroke workup:  -CTh: negative    -CTA h/n: >50% Calcification Marco Carotid Arteries.    -MRI brain:  negative  -ECHO:  Normal left atrial size.Study is negative for shunt.ejection fraction of 55 - 60%.  -LDL:  89  -TSH:   0.955  -home medications include: ASA 81 mg  and Plavix  -CT Chest:   No aortic abnormality     No Pulmonary embolism (per report)     Findings seen consistent with COPD bilaterally with bibasilar atelectasis versus developing infiltrates    Plan:  -S/p TNK      -permissive HTN for now ... SBP less than 180  -awaiting read of MRI C spine w/o  -continue therapy efforts   -continue aspirin 81 mg and atorvastatin 80 mg       Further recommendations to follow from MD    VTE Risk Mitigation (From admission, onward)           Ordered     Reason for No Pharmacological  VTE Prophylaxis  Once        Comments: S/P TPA   Question:  Reasons:  Answer:  Physician Provided (leave comment)    06/10/24 1532     IP VTE HIGH RISK PATIENT  Once         06/10/24 1532     Place sequential compression device  Until discontinued         06/10/24 1532                    Halley Hartley NP  Neurology  [facility] - 7th Floor ICU

## 2024-06-13 NOTE — PT/OT/SLP PROGRESS
Occupational Therapy   Treatment    Name: Edison Jacobo  MRN: [id number]  Admitting Diagnosis:  Stroke determined by clinical assessment       Recommendations:     Recommended therapy intensity at discharge: High Intensity Therapy   Discharge Equipment Recommendations:  to be determined by next level of care  Barriers to discharge:       Assessment:     Edison Jacobo is a 64 y.o. male with a medical diagnosis of  a medical diagnosis of CVA s/p TNK, MRI negative, pending further workup.  He reports his L LE feels weaker today, no functional difference noted.  Continues to remain motivated, excellent high intensity candidate. Performance deficits affecting function are weakness, impaired self care skills, impaired functional mobility, gait instability, impaired balance, visual deficits, decreased coordination, decreased upper extremity function, decreased lower extremity function, impaired fine motor.     Rehab Prognosis:  Good; patient would benefit from acute skilled OT services to address these deficits and reach maximum level of function.       Plan:     Patient to be seen 6 x/week to address the above listed problems via self-care/home management, therapeutic exercises, neuromuscular re-education, therapeutic activities  Plan of Care Expires:    Plan of Care Reviewed with: spouse, patient, family    Subjective     Pain/Comfort:  Pain Rating 1: 0/10    Objective:     Communicated with: RN prior to session.  Patient found HOB elevated with  (vital monitoring, SCD) upon OT entry to room.    General Precautions: Standard, fall (<180)    Orthopedic Precautions:N/A  Braces: N/A  Respiratory Status: Room air  Vital Signs: 140/96     Occupational Performance:     Bed Mobility:    Patient completed Supine to Sit with stand by assistance     Functional Mobility/Transfers:  Patient completed Sit <> Stand Transfer with moderate assistance  with  hand-held assist   Functional Mobility: Mod A sit<>stand, min assist stand pivot  techniques.  Reports feeling L LE weakness today, no buckling noted.  Fear of falling,      Activities of Daily Living:  Lower Body Dressing: minimum assistance    UB dressing: min A hemitechniques, unable to snap due to decreased hand strength        Therapeutic Exercise:  ASHELY ENGEL, 1/5 shoulder wall crawls, AROM elbow and distal 1/15  Provided with HEP for hand  Rest breaks required    AMPAC 6 Click ADL: 18    Patient Education:  Patient provided with verbal education. Handouts education regarding OT role/goals/POC, fall prevention, and Discharge/DME recommendations.  Understanding was verbalized, however additional teaching warranted.      Patient left up in chair with all lines intact, call button in reach, and RN notified.    GOALS:   Multidisciplinary Problems       Occupational Therapy Goals          Problem: Occupational Therapy    Goal Priority Disciplines Outcome Interventions   Occupational Therapy Goal     OT, PT/OT Progressing    Description: Goals to be met by: 7/11/24     Patient will increase functional independence with ADLs by performing:    UE Dressing with Supervision.  LE Dressing with Supervision.  Grooming while standing at sink with Supervision.  Toileting from toilet with Supervision for hygiene and clothing management.   Toilet transfer to toilet with Supervision.  Increased functional strength to 4/5 through therEx, neuromuscular re-ed  Pt will visually scan L side of environment with no verbal cues.                         Time Tracking:     OT Date of Treatment:    OT Start Time: 0854  OT Stop Time: 0940  OT Total Time (min): 46 min    Billable Minutes:Self Care/Home Management 2  Therapeutic Exercise 1    OT/OLYA: OT     Number of OLYA visits since last OT visit: 2    6/13/2024

## 2024-06-13 NOTE — NURSING
Nurses Note -- 4 Eyes      6/13/2024   4:17 AM      Skin assessed during: Q Shift Change      [x] No Altered Skin Integrity Present    [x]Prevention Measures Documented      [] Yes- Altered Skin Integrity Present or Discovered   [] LDA Added if Not in Epic (Describe Wound)   [] New Altered Skin Integrity was Present on Admit and Documented in LDA   [] Wound Image Taken    Wound Care Consulted? No    Attending Nurse:  Ivett Marinelli RN/Staff Member:  shelton

## 2024-06-13 NOTE — SUBJECTIVE & OBJECTIVE
Subjective:     Interval History:   No new issues, reports very minimal improvement in left sided weakness, he is asking if he is going to have to go to rehab.  CT Chest negative for dissection or aneurysm.    Current Neurological Medications:     Current Facility-Administered Medications   Medication Dose Route Frequency Provider Last Rate Last Admin    acetaminophen tablet 650 mg  650 mg Oral Q4H PRN Xavier Moise MD   650 mg at 06/13/24 0806    aspirin EC tablet 81 mg  81 mg Oral Daily Griselda Pérez, NP   81 mg at 06/13/24 0806    atorvastatin tablet 80 mg  80 mg Oral Daily Joseph Mejia MD   80 mg at 06/13/24 0806    bisacodyL suppository 10 mg  10 mg Rectal Daily PRN Griselda Pérez NP        busPIRone tablet 30 mg  30 mg Oral Daily Latrell Bishop MD   30 mg at 06/13/24 0806    melatonin tablet 6 mg  6 mg Oral Nightly PRN Latrell Bishop MD        ondansetron injection 4 mg  4 mg Intravenous Q8H PRN Latrell Bishop MD        prochlorperazine injection Soln 5 mg  5 mg Intravenous Q6H PRN Latrell Bishop MD        sodium chloride 0.9% flush 10 mL  10 mL Intravenous PRN Griselda Pérez, NP        sodium chloride 0.9% flush 10 mL  10 mL Intravenous PRN Latrell Bishop MD        tamsulosin 24 hr capsule 0.4 mg  0.4 mg Oral Daily Joseph Mejia MD           Review of Systems  Neurological:  Positive for weakness (left sided weakness) and numbness (left sided numbness). Negative for dizziness, tremors, seizures, syncope, facial asymmetry, speech difficulty, light-headedness and headaches.   All other systems reviewed and are negative.  Objective:     Vital Signs (Most Recent):  Temp: 97.9 °F (36.6 °C) (06/13/24 0800)  Pulse: 79 (06/13/24 1000)  Resp: 18 (06/13/24 1000)  BP: (!) 140/96 (06/13/24 0900)  SpO2: 98 % (06/13/24 0800) Vital Signs (24h Range):  Temp:  [97.9 °F (36.6 °C)-98.1 °F (36.7 °C)] 97.9 °F (36.6 °C)  Pulse:  [] 79  Resp:  [13-33] 18  SpO2:  [97 %-98 %] 98 %  BP:  (118-140)/(73-96) 140/96     Weight: 96.2 kg (212 lb)  Body mass index is 26.5 kg/m².     Physical Exam   Vitals and nursing note reviewed.   Constitutional:       General: He is not in acute distress.     Appearance: Normal appearance. He is not toxic-appearing.   HENT:      Head: Normocephalic.      Right Ear: Hearing normal.      Left Ear: Hearing normal.   Eyes:      General: No visual field deficit.     Extraocular Movements:      Right eye: Normal extraocular motion and no nystagmus.      Left eye: Normal extraocular motion and no nystagmus.      Pupils: Pupils are equal, round, and reactive to light.   Cardiovascular:      Rate and Rhythm: Normal rate.   Pulmonary:      Effort: Pulmonary effort is normal.   Musculoskeletal:         General: No swelling. Normal range of motion.      Cervical back: Normal range of motion.      Right lower leg: No edema.      Left lower leg: No edema.   Skin:     General: Skin is warm and dry.      Capillary Refill: Capillary refill takes less than 2 seconds.   Neurological:      General: No focal deficit present.      Mental Status: He is alert and oriented to person, place, and time.      Cranial Nerves: No dysarthria or facial asymmetry.      Sensory: Sensory deficit (decreased sensation to LUE, LLE) present.      Motor: Weakness and pronator drift (LUE, LLE drift) present. No tremor, atrophy, abnormal muscle tone or seizure activity.      Coordination: Coordination normal. Finger-Nose-Finger Test normal.   Psychiatric:         Attention and Perception: Attention normal.         Mood and Affect: Affect normal.         Speech: Speech normal.         Behavior: Behavior normal. Behavior is cooperative.            NEUROLOGICAL EXAMINATION:      MENTAL STATUS   Oriented to person, place, and time.   Speech: speech is normal      CRANIAL NERVES      CN III, IV, VI   Pupils are equal, round, and reactive to light.     GAIT AND COORDINATION      Coordination   Finger to nose  coordination: normal          Significant Labs:   Recent Lab Results         06/13/24  0238        Albumin/Globulin Ratio 1.2       Albumin 3.6       ALP 50       ALT 59       Anion Gap 8.0       AST 27       BILIRUBIN TOTAL 0.4       BUN 10.5       BUN/CREAT RATIO 12       Calcium 9.3       Chloride 104       CO2 27       Creatinine 0.89       eGFR >60       Globulin, Total 2.9       Glucose 113       Magnesium  1.90       Phosphorus Level 3.1       Potassium 3.8       PROTEIN TOTAL 6.5       Sodium 139               Significant Imaging: I have reviewed all pertinent imaging results/findings within the past 24 hours.

## 2024-06-13 NOTE — ASSESSMENT & PLAN NOTE
Stroke   - presented with left hemisensory loss and LSW  - Stroke RF: CAD, HTN  - Intervention: TPA on  6/10/2024 at 1130 am.      Stroke workup:  -CTh: negative    -CTA h/n: >50% Calcification Marco Carotid Arteries.    -MRI brain:  negative  -ECHO:  Normal left atrial size.Study is negative for shunt.ejection fraction of 55 - 60%.  -LDL:  89  -TSH:   0.955  -home medications include: ASA 81 mg  and Plavix  -CT Chest:   No aortic abnormality     No Pulmonary embolism (per report)     Findings seen consistent with COPD bilaterally with bibasilar atelectasis versus developing infiltrates    Plan:  -S/p TNK      -permissive HTN for now ... SBP less than 180  -awaiting read of MRI C spine w/o  -continue therapy efforts   -continue aspirin 81 mg and atorvastatin 80 mg

## 2024-06-13 NOTE — PT/OT/SLP PROGRESS
Physical Therapy Treatment    Patient Name:  Edison Jacobo   MRN:  [id number]    Recommendations:     Discharge therapy intensity: High Intensity Therapy   Discharge Equipment Recommendations: to be determined by next level of care  Barriers to discharge:  medical dx, impaired mobility, decreased independence     Assessment:     Edison Jacobo is a 64 y.o. male admitted with a medical diagnosis of CVA s/p TNK, MRI negative, pending further workup.   He presents with the following impairments/functional limitations: weakness, gait instability, decreased upper extremity function, impaired endurance, decreased lower extremity function, impaired balance, impaired self care skills, impaired functional mobility.    Rehab Prognosis: Good; patient would benefit from acute skilled PT services to address these deficits and reach maximum level of function.    Recent Surgery: * No surgery found *      Plan:     During this hospitalization, patient would benefit from acute PT services 6 x/week to address the identified rehab impairments via gait training, therapeutic activities, therapeutic exercises, neuromuscular re-education and progress toward the following goals:    Plan of Care Expires:  07/11/24    Subjective     Chief Complaint: n/a  Patient/Family Comments/goals: to get stronger  Pain/Comfort:  Pain Rating 1: 0/10      Objective:     Communicated with NSG prior to session.  Patient found HOB elevated with blood pressure cuff, telemetry upon PT entry to room.     General Precautions: Standard, fall  Orthopedic Precautions: N/A  Braces: N/A  Respiratory Status: Room air  Blood Pressure: 159/88  Skin Integrity: Visible skin intact      Functional Mobility:  Bed Mobility:     Supine to Sit: stand by assistance  Sit to Supine: stand by assistance  Transfers:     Sit to Stand:  minimum assistance with no AD/R hallway rail  Bed to/from Chair: minimum assistance with  no AD  using  Squat Pivot  Gait: pt ambulates approx 18 ft with  use of R hallway rail and Laya; pt demo a step to pattern 2/2 B decreased step length- R limited by decreased stance time on LLE & L limited by decreased strength and foot clearance; pt unable to fully clear LLE on any steps therefore no initial contact of heel strike; increased time to complete distance with 2 standing rest breaks; L knee blocked but no buckling noted     Therapeutic Activities/Exercises:  X8 marches performed on LLE; adequate foot clearance with focus on hip and knee flexion; noted increased effort required to complete task    Education:  Patient provided with verbal education regarding PT role/goals/POC, fall prevention, safety awareness, and discharge/DME recommendations.  Understanding was verbalized.     Patient left HOB elevated with all lines intact, call button in reach, and RN notified    GOALS:   Multidisciplinary Problems       Physical Therapy Goals          Problem: Physical Therapy    Goal Priority Disciplines Outcome Goal Variances Interventions   Physical Therapy Goal     PT, PT/OT Progressing     Description: Goals to be met by: 24     Patient will increase functional independence with mobility by performin. Supine to sit with [place]  2. Sit to supine with [place]  3. Sit to stand transfer with Stand-by Assistance  4. Gait  x 150 feet with Stand-by Assistance using LRAD.   5. Increased functional strength to 5/5 for L LE.                         Time Tracking:     PT Received On: 24  PT Start Time: 1439     PT Stop Time: 1503  PT Total Time (min): 24 min     Billable Minutes: Gait Training 2    Treatment Type: Treatment  PT/PTA: PT     Number of PTA visits since last PT visit: 2     2024

## 2024-06-14 LAB
ALBUMIN SERPL-MCNC: 3.6 G/DL (ref 3.4–4.8)
ALBUMIN/GLOB SERPL: 1.2 RATIO (ref 1.1–2)
ALP SERPL-CCNC: 48 UNIT/L (ref 40–150)
ALT SERPL-CCNC: 61 UNIT/L (ref 0–55)
ANION GAP SERPL CALC-SCNC: 6 MEQ/L
AST SERPL-CCNC: 31 UNIT/L (ref 5–34)
BILIRUB SERPL-MCNC: 0.5 MG/DL
BUN SERPL-MCNC: 11.5 MG/DL (ref 8.4–25.7)
CALCIUM SERPL-MCNC: 9 MG/DL (ref 8.8–10)
CHLORIDE SERPL-SCNC: 105 MMOL/L (ref 98–107)
CO2 SERPL-SCNC: 28 MMOL/L (ref 23–31)
CREAT SERPL-MCNC: 0.92 MG/DL (ref 0.73–1.18)
CREAT/UREA NIT SERPL: 13
GFR SERPLBLD CREATININE-BSD FMLA CKD-EPI: >60 ML/MIN/1.73/M2
GLOBULIN SER-MCNC: 2.9 GM/DL (ref 2.4–3.5)
GLUCOSE SERPL-MCNC: 97 MG/DL (ref 82–115)
MAGNESIUM SERPL-MCNC: 2 MG/DL (ref 1.6–2.6)
PHOSPHATE SERPL-MCNC: 3.5 MG/DL (ref 2.3–4.7)
POTASSIUM SERPL-SCNC: 4 MMOL/L (ref 3.5–5.1)
PROT SERPL-MCNC: 6.5 GM/DL (ref 5.8–7.6)
SODIUM SERPL-SCNC: 139 MMOL/L (ref 136–145)

## 2024-06-14 PROCEDURE — 25000003 PHARM REV CODE 250

## 2024-06-14 PROCEDURE — 80053 COMPREHEN METABOLIC PANEL: CPT

## 2024-06-14 PROCEDURE — 84100 ASSAY OF PHOSPHORUS: CPT

## 2024-06-14 PROCEDURE — 97116 GAIT TRAINING THERAPY: CPT | Mod: CQ

## 2024-06-14 PROCEDURE — 25000003 PHARM REV CODE 250: Performed by: INTERNAL MEDICINE

## 2024-06-14 PROCEDURE — 97535 SELF CARE MNGMENT TRAINING: CPT | Mod: CO

## 2024-06-14 PROCEDURE — 36415 COLL VENOUS BLD VENIPUNCTURE: CPT

## 2024-06-14 PROCEDURE — 63600175 PHARM REV CODE 636 W HCPCS: Performed by: INTERNAL MEDICINE

## 2024-06-14 PROCEDURE — 83735 ASSAY OF MAGNESIUM: CPT

## 2024-06-14 PROCEDURE — 21400001 HC TELEMETRY ROOM

## 2024-06-14 RX ORDER — METHOCARBAMOL 500 MG/1
500 TABLET, FILM COATED ORAL 4 TIMES DAILY
Status: DISCONTINUED | OUTPATIENT
Start: 2024-06-14 | End: 2024-06-16

## 2024-06-14 RX ADMIN — ENOXAPARIN SODIUM 40 MG: 40 INJECTION SUBCUTANEOUS at 03:06

## 2024-06-14 RX ADMIN — HYDROXYZINE HYDROCHLORIDE 25 MG: 25 TABLET, FILM COATED ORAL at 08:06

## 2024-06-14 RX ADMIN — ACETAMINOPHEN 650 MG: 325 TABLET, FILM COATED ORAL at 09:06

## 2024-06-14 RX ADMIN — METHOCARBAMOL 500 MG: 500 TABLET ORAL at 03:06

## 2024-06-14 RX ADMIN — ATORVASTATIN CALCIUM 80 MG: 40 TABLET, FILM COATED ORAL at 08:06

## 2024-06-14 RX ADMIN — ASPIRIN 81 MG: 81 TABLET, COATED ORAL at 09:06

## 2024-06-14 RX ADMIN — BUSPIRONE HYDROCHLORIDE 30 MG: 15 TABLET ORAL at 09:06

## 2024-06-14 RX ADMIN — Medication 6 MG: at 08:06

## 2024-06-14 NOTE — NURSING
Nurses Note -- 4 Eyes      6/14/2024   12:41 PM      Skin assessed during: Daily Assessment      [x] No Altered Skin Integrity Present    [x]Prevention Measures Documented      [] Yes- Altered Skin Integrity Present or Discovered   [] LDA Added if Not in Epic (Describe Wound)   [] New Altered Skin Integrity was Present on Admit and Documented in LDA   [] Wound Image Taken    Wound Care Consulted? No    Attending Nurse:  Delaney Marinelli RN/Staff Member:  KIMBERLY Elizalde

## 2024-06-14 NOTE — CONSULTS
[facility] - 7th Floor ICU  Neurosurgery  Consult Note    Inpatient consult to Neurosurgery  Consult performed by: Nayana Greenberg PA  Consult ordered by: Radha Arciniega MD        Subjective:     Chief Complaint/Reason for Admission: Left sided weakness    History of Present Illness: Patient is a 64 year old male with a past medical history of hypertension, hyperlipidemia, CAD, myocardial infarction status post stent (2022), stroke without residual deficits (approximate 20 years ago), who presented to [facility] ED (06/10/2024) via transfer from [facility] for higher level of care due to patient initially presenting with left-sided weakness, numbness, tingling concerning for stroke.  He reports the symptoms began at 9:00 a.m. when he was cutting grass and started experiencing 8/10 aching substernal chest pain with radiation to back subsequently developed shortness of breath followed by left-sided weakness (leg> arm) and numbness (arm/leg). Patient presented to [facility] for evaluation at that time. CT head negative for acute abnormality. CTA H/N showed CTA: >50% Calcification Marco Carotid Arteries.  Patient given tPA at approximately 1130 prior to transfer to [facility] for higher level of care. Upon arrival and initial assessment, patient fully awake, alert, oriented but positive for left sided weakness and sensory loss.  Left upper extremity pronator drift present.      Patient has continued with left sided weakness. MRI of brain was negative for acute stroke so neurology ordered MRI of C spine. Dr. Houston has been consulted for evaluation and review of MRI.    On PE today he is sitting up in the chair, NAD. He continues with left UE and LE weakness as well as left sided facial droop, left facial numbness and left hemianopsia laterally. He states that his symptoms came on abruptly while mowing his yard. His face became numb and he had immediate left sided weakness. He denies fall or  recent trauma.    PTA Medications   Medication Sig    amLODIPine (NORVASC) 5 MG tablet Take 5 mg by mouth.    aspirin (ECOTRIN) 81 MG EC tablet Take 81 mg by mouth.    bisoprolol (ZEBETA) 5 MG tablet TAKE 1 TABLET BY MOUTH EVERY DAY FOR HEART    busPIRone (BUSPAR) 30 MG Tab Take 30 mg by mouth.    cholecalciferol, vitamin D3, (VITAMIN D3) 50 mcg (2,000 unit) Cap capsule Take 25 mcg by mouth.    clopidogreL (PLAVIX) 75 mg tablet TAKE 1 TABLET BY MOUTH EVERY DAY FOR CLOT PREVENTION    cyanocobalamin (VITAMIN B-12) 1000 MCG tablet Take 1 tablet by mouth every morning.    desonide (DESOWEN) 0.05 % cream APPLY 1 APPLICATION SPARINGLY TOPICALLY TO THE AFFECTED AREA OF THE FACE AND FOREHEAD ONCE A DAY 3 TIMES A WEEK    hydrOXYzine HCL (ATARAX) 25 MG tablet TAKE 1 TABLET BY MOUTH EVERY 6 HOURS AS DIRECTED FOR BREAKTHROUGH ANXIETY    omega-3 fatty acids/fish oil (FISH OIL-OMEGA-3 FATTY ACIDS) 300-1,000 mg capsule Take 2 g by mouth.    pravastatin (PRAVACHOL) 20 MG tablet TAKE 1 TABLET BY MOUTH DAILY WITH THE EVENING MEAL FOR CHOLESTEROL    ondansetron (ZOFRAN-ODT) 4 MG TbDL DISSOLVE ONE TABLET UNDER THE TONGUE EVERY 6 HOURS    oxyCODONE-acetaminophen (PERCOCET) 5-325 mg per tablet Take 1 tablet by mouth every 4 to 6 hours as needed.    tamsulosin (FLOMAX) 0.4 mg Cap TAKE 1 CAPSULE BY MOUTH DAILY FOR PROSTATE    [DISCONTINUED] phenazopyridine (PYRIDIUM) 200 MG tablet Take 200 mg by mouth 3 (three) times daily as needed.       Review of patient's allergies indicates:   Allergen Reactions    Lortab [hydrocodone-acetaminophen] Other (See Comments)     GI upset       No past medical history on file.  No past surgical history on file.  Family History    None       Tobacco Use    Smoking status: Not on file    Smokeless tobacco: Not on file   Substance and Sexual Activity    Alcohol use: Not on file    Drug use: Not on file    Sexual activity: Not on file     Review of Systems  Objective:   12 pt ROS WNL, except for HPI    Weight:  96.2 kg (212 lb)  Body mass index is 26.5 kg/m².  Vital Signs (Most Recent):  Temp: 98.9 °F (37.2 °C) (06/14/24 0800)  Pulse: 83 (06/14/24 0900)  Resp: (!) 22 (06/14/24 0900)  BP: 130/80 (06/14/24 0815)  SpO2: 99 % (06/14/24 0800) Vital Signs (24h Range):  Temp:  [97.9 °F (36.6 °C)-98.9 °F (37.2 °C)] 98.9 °F (37.2 °C)  Pulse:  [70-97] 83  Resp:  [13-30] 22  SpO2:  [98 %-99 %] 99 %  BP: (117-140)/(72-96) 130/80     Date 06/14/24 0700 - 06/15/24 0659   Shift 7638-7884 1109-9505 6473-1910 24 Hour Total   INTAKE   Shift Total(mL/kg)       OUTPUT   Urine(mL/kg/hr) 275   275   Shift Total(mL/kg) 275(2.9)   275(2.9)   Weight (kg) 96.2 96.2 96.2 96.2                            Physical Exam:  Nursing note and vitals reviewed.    Constitutional: He appears well-developed. No distress.     Eyes: Pupils are equal, round, and reactive to light. EOM are normal.   C/o left hemianopsia laterally     Abdominal: Soft.     Psych/Behavior: He is alert. He is oriented to person, place, and time. He has a normal mood and affect.     Musculoskeletal:        Neck: Range of motion is full.        Back: Range of motion is full.        Right Upper Extremities: Range of motion is full. Muscle strength is 5/5.       Right Lower Extremities: Range of motion is full. Muscle strength is 5/5.      Comments: Left UE: 3/5 deltoids, 4/5 triceps, 3/5 biceps and   Left LE: 3/5 hip flexion, 2/5 knee ext, DF/PF/EHL     Neurological:        Sensory: There is no sensory deficit in the trunk. There is sensory deficit in the extremities. Sensory deficit is located Diffusely numb throughout left UE and LE.        DTRs: Tricep reflexes are 2+ on the right side and 1+ on the left side. Bicep reflexes are 2+ on the right side and 1+ on the left side. Patellar reflexes are 1+ on the right side and 1+ on the left side. Achilles reflexes are 1+ on the right side and 1+ on the left side. He displays no Babinski's sign on the right side. He displays no Babinski's  "sign on the left side.        Cranial nerves:   Left facial droop    + pronator drift on the left    Significant Labs:  Recent Labs   Lab 06/13/24 0238 06/14/24  0235    139   K 3.8 4.0    105   CO2 27 28   BUN 10.5 11.5   CREATININE 0.89 0.92   CALCIUM 9.3 9.0   MG 1.90 2.00     No results for input(s): "WBC", "HGB", "HCT", "PLT" in the last 48 hours.  No results for input(s): "LABPT", "INR", "APTT" in the last 48 hours.  Microbiology Results (last 7 days)       ** No results found for the last 168 hours. **            Significant Diagnostics:  MRI Cervical Spine Without Contrast [[id number]] Resulted: 06/13/24 1823   Order Status: Completed Updated: 06/13/24 1825   Narrative:     EXAMINATION  MRI CERVICAL SPINE WITHOUT CONTRAST    CLINICAL HISTORY  Neck pain, acute, prior cervical surgery;Spinal stenosis, cervical;    TECHNIQUE  Multiplanar, multisequence MR images of the cervical spine were obtained without the intravenous administration of gadolinium-based contrast media.    COMPARISON  None available at the time of initial interpretation.    FINDINGS  Exam quality: adequate for evaluation    There are notable degenerative osseous and disc space changes throughout the cervical spine, as discussed level-by-level below.  No evidence of acute vertebral body compression deformity or traumatic column malalignment is identified.  There is no convincing focal or destructive bony lesion.    C1-2: No evidence of acute cortical irregularity, marrow edema, or malalignment. No significant stenosis.    C2-3: Degenerative disc desiccation and minimal intervertebral height loss noted, with associated mild bilateral uncovertebral and facet arthrosis.  Moderate broad-based posterior disc protrusion is appreciated midline, resulting in mild narrowing of the spinal canal.  Mild-to-moderate bilateral osseous foraminal stenosis also appreciated (right > left).    C3-4: There is moderate degenerative intervertebral disc " height loss and signal changes of desiccation, as well as notable bilateral uncovertebral and facet arthrosis.  Mild to moderate broad-based posterior disc protrusion is also appreciated, with slight narrowing of the spinal canal.  Mild bilateral osseous foraminal narrowing.    C4-5: Moderate to severe degenerative intervertebral disc height loss is present, with slight broad-based posterior disc protrusion that produces mild spinal canal stenosis.  Mild-to-moderate bilateral osseous foraminal narrowing also appreciated.    C5-6: Moderate to severe intervertebral degenerative disc height loss with slight broad-based posterior disc protrusion and resulting mild spinal canal stenosis.  There is mild-to-moderate bilateral osseous foraminal narrowing.    C6-7: Notable degenerative disc height loss with left predominant broad-based posterior disc protrusion and focal mild-to-moderate spinal canal stenosis.  Bilateral osseous foraminal narrowing also appreciated (left > right).    C7-T1: No acute cortical displacement, suspicious marrow signal changes, or column malalignment. No significant disc protrusion or stenosis.    Spinal cord: The cervical spinal cord is without evidence of gross compression or acute signal alteration.    Other findings: No focal abnormality of the visualized paraspinal and neck soft tissues is appreciated.  Limited assessment of the posterior cranial fossa and thoracic inlet is unremarkable.    IMPRESSION  1. Degenerative changes throughout the cervical spine with associated multifocal mild-to-moderate spinal canal and bilateral foraminal stenosis, more detailed level-by-level description provided above.  2. No convincing evidence of acute spinal column abnormality or cord compression.       Assessment/Plan:     Active Diagnoses:    Diagnosis Date Noted POA    PRINCIPAL PROBLEM:  Stroke determined by clinical assessment [I63.9] 06/10/2024 Yes    Left-sided weakness [R53.1] 06/12/2024 Yes    Other  chest pain [R07.89] 06/10/2024 Yes      Problems Resolved During this Admission:     He continues with left sided weakness since admit  MRI of C spine reviwed; although he does have multilevel stenosis I do not think it is contributing to his current symptoms especially in light of the acute motor deficit in the absence of trauma  No neurosurgical intervention indicated  Recommend rehab  F/u prn    Thank you for your consult. I will sign off. Please contact us if you have any additional questions.    JAVIER Vazquez  Neurosurgery  [facility] - 7th Floor ICU

## 2024-06-14 NOTE — PROGRESS NOTES
[facility]  Hospital Medicine Progress Note        Chief Complaint:  Left-sided weakness         HPI: (personally reviewed by me and is documented from initial H&P)   64 y.o. male who has a medical history of coronary artery disease, hypertension, BPH.       The patient presented to [facility] on 6/10/2024 with a primary complaint of left-sided weakness.  Patient transferred from [facility] for post thrombolytic monitoring in ICU.       Patient was cutting grass and developed acute left-sided weakness, worse in left leg.  Patient had no associated complaints.  He had a stroke greater than 30 years ago with no residual deficits reported.    He has no other complaints.  Review of systems otherwise discussed in noted to be negative.    Physical therapist has evaluated patient in the ICU and is recommending rehabilitation.    Left-sided upper and lower extremity weakness still significantly present.    Nurses present at bedside during my evaluation.    No family present at bedside.     MRI has been obtained but showed no acute abnormalities suggestive of acute ischemic event.  CTA of the chest showed no aortic abnormality and no pulmonary embolism however there is bilateral bibasilar atelectasis versus developing infiltrates some of which is consistent with COPD.        Interval History:          Patient seen and examined at bedside, family members at bedside   Patient continues to have left-sided weakness  Mri of the cervical spine reviewed, consult Neurosurgery for input   ESR is 7, Labs reviewed and normal        Objective Assessment:  Physical Exam       Vital signs have been personally reviewed by me   General: Appears comfortable, no acute distress.  Sitting in chair on side of the bed  Neuro: awake, alert, oriented.  Comprehension intact.  Still with left upper and lower extremity weakness that has not worsened.    Integumentary: Warm, dry, intact.  Musculoskeletal:  Purposeful movement noted.     Respiratory: No accessory muscle use. Breath sounds are equal.  Cardiovascular: Regular rate.         Assessment and Plan       Left-sided upper and lower extremity weakness ? Cause . Mri brain negative for stroke   Coronary artery disease   Hypotension   BPH  History of CVA without residual deficits  Above present on admission         Anticipated discharge and Disposition when medically stable:  Rehabilitation     Therapy:  PT/OT     Nutrition: Cardiac diet as tolerated      ___________________________________________________________________________________________________________________________________        Left-sided weakness, findings consistent with cerebrovascular accident however MRI and CT of the head is negative.    Mri of the cervical spine reviewed, consult Neurosurgery for input    Neurology recs for rehab , likely mri negative cva   Add robaxin   F/up autoimmune workup,   Continue supportive care  Continue checking vital signs q4hrs.  Continue to monitor vitals sign and neuro checks q.4 hours, unless otherwise specified.   Prn fioricet for HA  Prn hydroxyzine for anxiety   Continue telemetry  Provide supplemental oxygen if needed for oxygen saturation less than 90-92%   Monitor blood glucose and avoid hypo/hyperglycemia, sliding scale insulin as needed     Nurse notified to page me if any changes occur      DVT prophylaxis initiated         Consults:   I have personally reviewed the specialist documentation and/or have spoken to the specialist with regard to the care of this patient; recommendations are noted above.            All diagnosis and differential diagnosis have been reviewed,  interpreted and communicated appropriately to care team. assessment and plan has been documented; I have personally reviewed the labs and test results that are presently available and pertinent to this hospital course; I have reviewed medical records based upon their availability.      All of the patient's questions have been  addressed and answered. Patient's is agreeable to the above stated plan.   I will continue to monitor closely and make adjustments to medical management as needed.      dispo- rehab       VITAL SIGNS: 24 HRS MIN & MAX LAST   Temp  Min: 98 °F (36.7 °C)  Max: 98.9 °F (37.2 °C) 98.9 °F (37.2 °C)   BP  Min: 117/81  Max: 138/72 131/84   Pulse  Min: 70  Max: 97  89   Resp  Min: 13  Max: 30 (!) 24   SpO2  Min: 99 %  Max: 99 % 99 %     I have reviewed the following labs:  Recent Labs   Lab 06/10/24  1527   WBC 9.39   RBC 5.02   HGB 15.0   HCT 46.2   MCV 92.0   MCH 29.9   MCHC 32.5*   RDW 12.7      MPV 10.8*     Recent Labs   Lab 06/12/24  0336 06/13/24  0238 06/14/24  0235    139 139   K 4.6 3.8 4.0    104 105   CO2 28 27 28   BUN 14.0 10.5 11.5   CREATININE 0.99 0.89 0.92   CALCIUM 9.6 9.3 9.0   MG 1.90 1.90 2.00   ALBUMIN 3.7 3.6 3.6   ALKPHOS 49 50 48   ALT 64* 59* 61*   AST 30 27 31   BILITOT 0.6 0.4 0.5     Microbiology Results (last 7 days)       ** No results found for the last 168 hours. **             See below for Radiology    Scheduled Med:   aspirin  81 mg Oral Daily    atorvastatin  80 mg Oral QHS    busPIRone  30 mg Oral Daily    enoxparin  40 mg Subcutaneous Q24H (prophylaxis, 1700)      Continuous Infusions:     PRN Meds:    Current Facility-Administered Medications:     acetaminophen, 650 mg, Oral, Q4H PRN    bisacodyL, 10 mg, Rectal, Daily PRN    butalbital-acetaminophen-caffeine -40 mg, 1 tablet, Oral, Q4H PRN    hydrOXYzine HCL, 25 mg, Oral, TID PRN    melatonin, 6 mg, Oral, Nightly PRN    ondansetron, 4 mg, Intravenous, Q8H PRN    prochlorperazine, 5 mg, Intravenous, Q6H PRN    sodium chloride 0.9%, 10 mL, Intravenous, PRN    sodium chloride 0.9%, 10 mL, Intravenous, PRN     Assessment/Plan:      VTE prophylaxis:     Patient condition:  Stable/Fair/Guarded/ Serious/ Critical    Anticipated discharge and Disposition:         All  diagnosis and differential diagnosis have been reviewed; assessment and plan has been documented; I have personally reviewed the labs and test results that are presently available; I have reviewed the patients medication list; I have reviewed the consulting providers response and recommendations. I have reviewed or attempted to review medical records based upon their availability    All of the patient's questions have been  addressed and answered. Patient's is agreeable to the above stated plan. I will continue to monitor closely and make adjustments to medical management as needed.  _____________________________________________________________________    Nutrition Status:    Radiology:  I have personally reviewed the following imaging and agree with the radiologist.     MRI Cervical Spine Without Contrast  EXAMINATION  MRI CERVICAL SPINE WITHOUT CONTRAST    CLINICAL HISTORY  Neck pain, acute, prior cervical surgery;Spinal stenosis, cervical;    TECHNIQUE  Multiplanar, multisequence MR images of the cervical spine were obtained without the intravenous administration of gadolinium-based contrast media.    COMPARISON  None available at the time of initial interpretation.    FINDINGS  Exam quality: adequate for evaluation    There are notable degenerative osseous and disc space changes throughout the cervical spine, as discussed level-by-level below.  No evidence of acute vertebral body compression deformity or traumatic column malalignment is identified.  There is no convincing focal or destructive bony lesion.    C1-2: No evidence of acute cortical irregularity, marrow edema, or malalignment. No significant stenosis.    C2-3: Degenerative disc desiccation and minimal intervertebral height loss noted, with associated mild bilateral uncovertebral and facet arthrosis.  Moderate broad-based posterior disc protrusion is appreciated midline, resulting in mild narrowing of the spinal canal.  Mild-to-moderate bilateral  osseous foraminal stenosis also appreciated (right > left).    C3-4: There is moderate degenerative intervertebral disc height loss and signal changes of desiccation, as well as notable bilateral uncovertebral and facet arthrosis.  Mild to moderate broad-based posterior disc protrusion is also appreciated, with slight narrowing of the spinal canal.  Mild bilateral osseous foraminal narrowing.    C4-5: Moderate to severe degenerative intervertebral disc height loss is present, with slight broad-based posterior disc protrusion that produces mild spinal canal stenosis.  Mild-to-moderate bilateral osseous foraminal narrowing also appreciated.    C5-6: Moderate to severe intervertebral degenerative disc height loss with slight broad-based posterior disc protrusion and resulting mild spinal canal stenosis.  There is mild-to-moderate bilateral osseous foraminal narrowing.    C6-7: Notable degenerative disc height loss with left predominant broad-based posterior disc protrusion and focal mild-to-moderate spinal canal stenosis.  Bilateral osseous foraminal narrowing also appreciated (left > right).    C7-T1: No acute cortical displacement, suspicious marrow signal changes, or column malalignment. No significant disc protrusion or stenosis.    Spinal cord: The cervical spinal cord is without evidence of gross compression or acute signal alteration.    Other findings: No focal abnormality of the visualized paraspinal and neck soft tissues is appreciated.  Limited assessment of the posterior cranial fossa and thoracic inlet is unremarkable.    IMPRESSION  1. Degenerative changes throughout the cervical spine with associated multifocal mild-to-moderate spinal canal and bilateral foraminal stenosis, more detailed level-by-level description provided above.  2. No convincing evidence of acute spinal column abnormality or cord compression.    Electronically signed by: Royce Willingham  Date:    06/13/2024  Time:    18:23      Radha AYON  MD Suze  Department of Hospital Medicine   [facility]   06/14/2024

## 2024-06-14 NOTE — PT/OT/SLP PROGRESS
Occupational Therapy   Treatment    Name: Edison Jacobo  MRN: [id number]  Admitting Diagnosis:  Stroke determined by clinical assessment       Recommendations:     Recommended therapy intensity at discharge: High Intensity Therapy   Discharge Equipment Recommendations:  to be determined by next level of care  Barriers to discharge:       Assessment:     Edison Jacobo is a 64 y.o. male with a medical diagnosis of Stroke determined by clinical assessment.  He presents with L sided weakness, Mod/Max A during stand step t/f, L lateral lean noted. Pt. Performing grooming task well with R UE for excursion. Pt. Is motivated and pleasant, recommending high intensity therapy. Performance deficits affecting function are weakness, impaired endurance, impaired self care skills, impaired functional mobility, impaired balance.     Rehab Prognosis:  Good; patient would benefit from acute skilled OT services to address these deficits and reach maximum level of function.       Plan:     Patient to be seen 6 x/week to address the above listed problems via self-care/home management, therapeutic exercises, neuromuscular re-education, therapeutic activities  Plan of Care Expires:    Plan of Care Reviewed with: patient    Subjective     Pain/Comfort:       Objective:     Communicated with: RN prior to session.  Patient found HOB elevated with   upon OT entry to room.    General Precautions: Standard, fall (<180)    Orthopedic Precautions:N/A  Braces: N/A  Respiratory Status: Room air  Vital Signs: Blood Pressure: 130/80     Occupational Performance:   (Bed Mobility-Min A)  (Sitting balance- CGA)  (Max A required for donning socks)  (Sit to stand- Mod A)  Pt. Requiring Max-Mod A during stand step t/f from EOB to BS chair, increased cueing required for step progression, assistance required for weight shifting.   Pt. Repositioned in BS chair appropriately, wheeled to sink, sit to stand from BS chair Mod A, pt. Performing grooming task with  assistance required for setup using R UE for excursion.     Therapeutic Positioning    OT interventions performed during the course of today's session in an effort to prevent and/or reduce acquired pressure injuries:   Therapeutic positioning was provided at the conclusion of session to offload all bony prominences for the prevention and/or reduction of pressure injuries        [facility] 6 Click ADL:      Patient Education:  Patient provided with verbal education education regarding fall prevention, safety awareness, and pressure ulcer prevention.  Additional teaching is warranted.      Patient left up in chair with all lines intact and call button in reach.    GOALS:   Multidisciplinary Problems       Occupational Therapy Goals          Problem: Occupational Therapy    Goal Priority Disciplines Outcome Interventions   Occupational Therapy Goal     OT, PT/OT Progressing    Description: Goals to be met by: 7/11/24     Patient will increase functional independence with ADLs by performing:    UE Dressing with Supervision.  LE Dressing with Supervision.  Grooming while standing at sink with Supervision.  Toileting from toilet with Supervision for hygiene and clothing management.   Toilet transfer to toilet with Supervision.  Increased functional strength to 4/5 through therEx, neuromuscular re-ed  Pt will visually scan L side of environment with no verbal cues.                         Time Tracking:     OT Date of Treatment: 06/14/24  OT Start Time: 0924  OT Stop Time: 0938  OT Total Time (min): 14 min    Billable Minutes:Self Care/Home Management 1    OT/OLYA: OLYA     Number of OLYA visits since last OT visit: 3    6/14/2024

## 2024-06-14 NOTE — PT/OT/SLP PROGRESS
Physical Therapy Treatment    Patient Name:  Edison Jacobo   MRN:  [id number]    Recommendations:     Discharge therapy intensity: High Intensity Therapy   Discharge Equipment Recommendations: to be determined by next level of care  Barriers to discharge: Impaired mobility    Assessment:     Edison Jacobo is a 64 y.o. male.  He presents with the following impairments/functional limitations: weakness, gait instability, decreased upper extremity function, impaired endurance, decreased lower extremity function, impaired balance, impaired self care skills, impaired functional mobility.    Rehab Prognosis: Good; patient would benefit from acute skilled PT services to address these deficits and reach maximum level of function.    Recent Surgery: * No surgery found *      Plan:     During this hospitalization, patient would benefit from acute PT services 6 x/week to address the identified rehab impairments via gait training, therapeutic activities, therapeutic exercises, neuromuscular re-education and progress toward the following goals:    Plan of Care Expires:  07/11/24    Subjective     Chief Complaint: fatigue with ambulation.     Objective:     Communicated with nurse prior to session.  Patient found up in chair with blood pressure cuff, telemetry upon PT entry to room.     General Precautions: Standard, fall  Orthopedic Precautions: N/A  Braces: N/A  Respiratory Status: Room air  Blood Pressure: WNL  Skin Integrity: Visible skin intact      Functional Mobility:  Min assist STS and for transfers  Gait 12 ft handrail min assist and 15 ft 30 ft RW min assist. Ace wrap for DF assist with improved swing and prevent any toe drag. Improvement in mobility today with increased motor planning/execution.     Education Provided:  Role and goals of PT, transfer training, bed mobility, gait training, balance training, safety awareness, assistive device, strengthening exercises, and importance of participating in PT to return to  PLOF.     Patient left up in chair with all lines intact and call button in reach    GOALS:   Multidisciplinary Problems       Physical Therapy Goals          Problem: Physical Therapy    Goal Priority Disciplines Outcome Goal Variances Interventions   Physical Therapy Goal     PT, PT/OT Progressing     Description: Goals to be met by: 24     Patient will increase functional independence with mobility by performin. Supine to sit with [place]  2. Sit to supine with C[place]  3. Sit to stand transfer with Stand-by Assistance  4. Gait  x 150 feet with Stand-by Assistance using LRAD.   5. Increased functional strength to 5/5 for L LE.                         Time Tracking:     Billable Minutes: Gait Training 25    Treatment Type: Treatment  PT/PTA: PTA     Number of PTA visits since last PT visit: 3     2024

## 2024-06-15 LAB
ALBUMIN SERPL-MCNC: 3.8 G/DL (ref 3.4–4.8)
ALBUMIN/GLOB SERPL: 1.3 RATIO (ref 1.1–2)
ALP SERPL-CCNC: 53 UNIT/L (ref 40–150)
ALT SERPL-CCNC: 79 UNIT/L (ref 0–55)
ANION GAP SERPL CALC-SCNC: 9 MEQ/L
AST SERPL-CCNC: 46 UNIT/L (ref 5–34)
BILIRUB SERPL-MCNC: 0.5 MG/DL
BUN SERPL-MCNC: 11.6 MG/DL (ref 8.4–25.7)
CALCIUM SERPL-MCNC: 9.3 MG/DL (ref 8.8–10)
CHLORIDE SERPL-SCNC: 104 MMOL/L (ref 98–107)
CO2 SERPL-SCNC: 27 MMOL/L (ref 23–31)
CREAT SERPL-MCNC: 0.91 MG/DL (ref 0.73–1.18)
CREAT/UREA NIT SERPL: 13
GFR SERPLBLD CREATININE-BSD FMLA CKD-EPI: >60 ML/MIN/1.73/M2
GLOBULIN SER-MCNC: 2.9 GM/DL (ref 2.4–3.5)
GLUCOSE SERPL-MCNC: 93 MG/DL (ref 82–115)
MAGNESIUM SERPL-MCNC: 2 MG/DL (ref 1.6–2.6)
PHOSPHATE SERPL-MCNC: 3.3 MG/DL (ref 2.3–4.7)
POTASSIUM SERPL-SCNC: 5.2 MMOL/L (ref 3.5–5.1)
PROT SERPL-MCNC: 6.7 GM/DL (ref 5.8–7.6)
SODIUM SERPL-SCNC: 140 MMOL/L (ref 136–145)

## 2024-06-15 PROCEDURE — 83735 ASSAY OF MAGNESIUM: CPT

## 2024-06-15 PROCEDURE — 84100 ASSAY OF PHOSPHORUS: CPT

## 2024-06-15 PROCEDURE — 63600175 PHARM REV CODE 636 W HCPCS: Performed by: INTERNAL MEDICINE

## 2024-06-15 PROCEDURE — 25000003 PHARM REV CODE 250: Performed by: INTERNAL MEDICINE

## 2024-06-15 PROCEDURE — 21400001 HC TELEMETRY ROOM

## 2024-06-15 PROCEDURE — 80053 COMPREHEN METABOLIC PANEL: CPT

## 2024-06-15 PROCEDURE — 97530 THERAPEUTIC ACTIVITIES: CPT | Mod: CQ

## 2024-06-15 PROCEDURE — 25000003 PHARM REV CODE 250

## 2024-06-15 PROCEDURE — 36415 COLL VENOUS BLD VENIPUNCTURE: CPT

## 2024-06-15 PROCEDURE — 97116 GAIT TRAINING THERAPY: CPT | Mod: CQ

## 2024-06-15 RX ADMIN — ATORVASTATIN CALCIUM 80 MG: 40 TABLET, FILM COATED ORAL at 08:06

## 2024-06-15 RX ADMIN — BUTALBITAL, ACETAMINOPHEN, AND CAFFEINE 1 TABLET: 325; 50; 40 TABLET ORAL at 08:06

## 2024-06-15 RX ADMIN — SODIUM ZIRCONIUM CYCLOSILICATE 10 G: 10 POWDER, FOR SUSPENSION ORAL at 10:06

## 2024-06-15 RX ADMIN — BUSPIRONE HYDROCHLORIDE 30 MG: 15 TABLET ORAL at 08:06

## 2024-06-15 RX ADMIN — METHOCARBAMOL 500 MG: 500 TABLET ORAL at 04:06

## 2024-06-15 RX ADMIN — METHOCARBAMOL 500 MG: 500 TABLET ORAL at 08:06

## 2024-06-15 RX ADMIN — ASPIRIN 81 MG: 81 TABLET, COATED ORAL at 08:06

## 2024-06-15 RX ADMIN — ENOXAPARIN SODIUM 40 MG: 40 INJECTION SUBCUTANEOUS at 04:06

## 2024-06-15 NOTE — PROGRESS NOTES
[facility]  Hospital Medicine Progress Note        Chief Complaint:  Left-sided weakness         HPI: (personally reviewed by me and is documented from initial H&P)   64 y.o. male who has a medical history of coronary artery disease, hypertension, BPH.       The patient presented to [facility] on 6/10/2024 with a primary complaint of left-sided weakness.  Patient transferred from [facility] for post thrombolytic monitoring in ICU.       Patient was cutting grass and developed acute left-sided weakness, worse in left leg.  Patient had no associated complaints.  He had a stroke greater than 30 years ago with no residual deficits reported.    He has no other complaints.  Review of systems otherwise discussed in noted to be negative.    Physical therapist has evaluated patient in the ICU and is recommending rehabilitation.    Left-sided upper and lower extremity weakness still significantly present.    Nurses present at bedside during my evaluation.    No family present at bedside.     MRI has been obtained but showed no acute abnormalities suggestive of acute ischemic event.  CTA of the chest showed no aortic abnormality and no pulmonary embolism however there is bilateral bibasilar atelectasis versus developing infiltrates some of which is consistent with COPD.Mri of the cervical spine reviewed,  neurosurgery evaluated no indications for surgery at this time recommends rehab for left-sided weakness        Interval History:           Patient working with physical therapy   Vitals reviewed and stable   Labs significant for potassium of 5.2 will give a dose of Lokelma   Currently awaiting rehab placement           Assessment and Plan       Hyperkalemia, mild  Left-sided upper and lower extremity weakness ? Cause . Mri brain negative for stroke   Coronary artery disease   Hypotension   BPH  History of CVA without residual deficits  Above present on admission         Anticipated  discharge and Disposition when medically stable:  Rehabilitation     Therapy:  PT/OT     Nutrition: Cardiac diet as tolerated      ___________________________________________________________________________________________________________________________________        Labs significant for potassium of 5.2 will give a dose of Lokelma   Currently awaiting rehab placement  Left-sided weakness, findings consistent with cerebrovascular accident however MRI and CT of the head is negative.    Mri of the cervical spine reviewed, nsgy no surgical interventions warranted, rec rehab   Neurology recs for rehab , likely mri negative cva   Add robaxin   Continue supportive care  Continue checking vital signs q4hrs.  Continue to monitor vitals sign and neuro checks q.4 hours, unless otherwise specified.   Prn fioricet for HA  Prn hydroxyzine for anxiety   Continue telemetry  Provide supplemental oxygen if needed for oxygen saturation less than 90-92%   Monitor blood glucose and avoid hypo/hyperglycemia, sliding scale insulin as needed        DVT prophylaxis initiated         Consults:   I have personally reviewed the specialist documentation and/or have spoken to the specialist with regard to the care of this patient; recommendations are noted above.            All diagnosis and differential diagnosis have been reviewed,  interpreted and communicated appropriately to care team. assessment and plan has been documented; I have personally reviewed the labs and test results that are presently available and pertinent to this hospital course; I have reviewed medical records based upon their availability.     All of the patient's questions have been  addressed and answered. Patient's is agreeable to the above stated plan.   I will continue to monitor closely and make adjustments to medical management as needed.      dispo- rehab    VITAL SIGNS: 24 HRS MIN & MAX LAST   Temp  Min: 97.7 °F (36.5 °C)  Max: 97.9 °F (36.6 °C) 97.9 °F (36.6  °C)   BP  Min: 123/76  Max: 131/92 126/77   Pulse  Min: 67  Max: 100  81   Resp  Min: 13  Max: 28 13   SpO2  Min: 100 %  Max: 100 % 100 %     I have reviewed the following labs:  Recent Labs   Lab 06/10/24  1527   WBC 9.39   RBC 5.02   HGB 15.0   HCT 46.2   MCV 92.0   MCH 29.9   MCHC 32.5*   RDW 12.7      MPV 10.8*     Recent Labs   Lab 06/13/24  0238 06/14/24  0235 06/15/24  0646    139 140   K 3.8 4.0 5.2*    105 104   CO2 27 28 27   BUN 10.5 11.5 11.6   CREATININE 0.89 0.92 0.91   CALCIUM 9.3 9.0 9.3   MG 1.90 2.00 2.00   ALBUMIN 3.6 3.6 3.8   ALKPHOS 50 48 53   ALT 59* 61* 79*   AST 27 31 46*   BILITOT 0.4 0.5 0.5     Microbiology Results (last 7 days)       ** No results found for the last 168 hours. **             See below for Radiology    Scheduled Med:   aspirin  81 mg Oral Daily    atorvastatin  80 mg Oral QHS    busPIRone  30 mg Oral Daily    enoxparin  40 mg Subcutaneous Q24H (prophylaxis, 1700)    methocarbamoL  500 mg Oral QID      Continuous Infusions:     PRN Meds:    Current Facility-Administered Medications:     acetaminophen, 650 mg, Oral, Q4H PRN    bisacodyL, 10 mg, Rectal, Daily PRN    butalbital-acetaminophen-caffeine -40 mg, 1 tablet, Oral, Q4H PRN    hydrOXYzine HCL, 25 mg, Oral, TID PRN    melatonin, 6 mg, Oral, Nightly PRN    ondansetron, 4 mg, Intravenous, Q8H PRN    prochlorperazine, 5 mg, Intravenous, Q6H PRN    sodium chloride 0.9%, 10 mL, Intravenous, PRN    sodium chloride 0.9%, 10 mL, Intravenous, PRN     Assessment/Plan:      VTE prophylaxis:     Patient condition:  Stable/Fair/Guarded/ Serious/ Critical    Anticipated discharge and Disposition:         All diagnosis and differential diagnosis have been reviewed; assessment and plan has been documented; I have personally reviewed the labs and test results that are presently available; I have reviewed the patients medication list; I have reviewed the consulting providers response and recommendations. I have  reviewed or attempted to review medical records based upon their availability    All of the patient's questions have been  addressed and answered. Patient's is agreeable to the above stated plan. I will continue to monitor closely and make adjustments to medical management as needed.  _____________________________________________________________________    Nutrition Status:    Radiology:  I have personally reviewed the following imaging and agree with the radiologist.     MRI Cervical Spine Without Contrast  EXAMINATION  MRI CERVICAL SPINE WITHOUT CONTRAST    CLINICAL HISTORY  Neck pain, acute, prior cervical surgery;Spinal stenosis, cervical;    TECHNIQUE  Multiplanar, multisequence MR images of the cervical spine were obtained without the intravenous administration of gadolinium-based contrast media.    COMPARISON  None available at the time of initial interpretation.    FINDINGS  Exam quality: adequate for evaluation    There are notable degenerative osseous and disc space changes throughout the cervical spine, as discussed level-by-level below.  No evidence of acute vertebral body compression deformity or traumatic column malalignment is identified.  There is no convincing focal or destructive bony lesion.    C1-2: No evidence of acute cortical irregularity, marrow edema, or malalignment. No significant stenosis.    C2-3: Degenerative disc desiccation and minimal intervertebral height loss noted, with associated mild bilateral uncovertebral and facet arthrosis.  Moderate broad-based posterior disc protrusion is appreciated midline, resulting in mild narrowing of the spinal canal.  Mild-to-moderate bilateral osseous foraminal stenosis also appreciated (right > left).    C3-4: There is moderate degenerative intervertebral disc height loss and signal changes of desiccation, as well as notable bilateral uncovertebral and facet arthrosis.  Mild to moderate broad-based posterior disc protrusion is also appreciated,  with slight narrowing of the spinal canal.  Mild bilateral osseous foraminal narrowing.    C4-5: Moderate to severe degenerative intervertebral disc height loss is present, with slight broad-based posterior disc protrusion that produces mild spinal canal stenosis.  Mild-to-moderate bilateral osseous foraminal narrowing also appreciated.    C5-6: Moderate to severe intervertebral degenerative disc height loss with slight broad-based posterior disc protrusion and resulting mild spinal canal stenosis.  There is mild-to-moderate bilateral osseous foraminal narrowing.    C6-7: Notable degenerative disc height loss with left predominant broad-based posterior disc protrusion and focal mild-to-moderate spinal canal stenosis.  Bilateral osseous foraminal narrowing also appreciated (left > right).    C7-T1: No acute cortical displacement, suspicious marrow signal changes, or column malalignment. No significant disc protrusion or stenosis.    Spinal cord: The cervical spinal cord is without evidence of gross compression or acute signal alteration.    Other findings: No focal abnormality of the visualized paraspinal and neck soft tissues is appreciated.  Limited assessment of the posterior cranial fossa and thoracic inlet is unremarkable.    IMPRESSION  1. Degenerative changes throughout the cervical spine with associated multifocal mild-to-moderate spinal canal and bilateral foraminal stenosis, more detailed level-by-level description provided above.  2. No convincing evidence of acute spinal column abnormality or cord compression.    Electronically signed by: Royce Willingham  Date:    06/13/2024  Time:    18:23      Radha Arciniega MD  Department of Hospital Medicine   [facility]   06/15/2024

## 2024-06-15 NOTE — PT/OT/SLP PROGRESS
Physical Therapy Treatment    Patient Name:  Edison Jacobo   MRN:  [id number]    Recommendations:     Discharge therapy intensity: High Intensity Therapy   Discharge Equipment Recommendations: to be determined by next level of care  Barriers to discharge: Decreased caregiver support and Impaired mobility    Assessment:     Edison Jacobo is a 64 y.o. male.  He presents with the following impairments/functional limitations: weakness, gait instability, decreased upper extremity function, impaired endurance, decreased lower extremity function, impaired balance, impaired self care skills, impaired functional mobility.    Rehab Prognosis: Good; patient would benefit from acute skilled PT services to address these deficits and reach maximum level of function.    Recent Surgery: * No surgery found *      Plan:     During this hospitalization, patient would benefit from acute PT services 6 x/week to address the identified rehab impairments via gait training, therapeutic activities, therapeutic exercises, neuromuscular re-education and progress toward the following goals:    Plan of Care Expires:  07/11/24    Subjective     Chief Complaint: weakness LUE/LE    Objective:     Communicated with nurse prior to session.  Patient found supine with blood pressure cuff, telemetry upon PT entry to room.     General Precautions: Standard, fall  Orthopedic Precautions: N/A  Braces: N/A  Respiratory Status: Room air  Blood Pressure: 126/77  Skin Integrity: Visible skin intact      Functional Mobility:  Min assist to get EOB and same for transfers  Gait 15 ft and 20 ft x 2 RW, mod assist for left foot advancement using gait belt as well as ace wrap AFO DF assist.   Increase motor planning difficulties today.     Education Provided:  Role and goals of PT, transfer training, bed mobility, gait training, balance training, safety awareness, assistive device, strengthening exercises, and importance of participating in PT to return to  PLOF.    Patient left supine with all lines intact and call button in reach    GOALS:   Multidisciplinary Problems       Physical Therapy Goals          Problem: Physical Therapy    Goal Priority Disciplines Outcome Goal Variances Interventions   Physical Therapy Goal     PT, PT/OT Progressing     Description: Goals to be met by: 24     Patient will increase functional independence with mobility by performin. Supine to sit with [place]  2. Sit to supine with [place]  3. Sit to stand transfer with Stand-by Assistance  4. Gait  x 150 feet with Stand-by Assistance using LRAD.   5. Increased functional strength to 5/5 for L LE.                         Time Tracking:     Billable Minutes: Gait Training 15  Act 10    Treatment Type: Treatment  PT/PTA: PTA     Number of PTA visits since last PT visit: 4     06/15/2024

## 2024-06-15 NOTE — NURSING
Nurses Note -- 4 Eyes      6/14/2024   7:37 PM      Skin assessed during: Q Shift Change      [x] No Altered Skin Integrity Present    [x]Prevention Measures Documented      [] Yes- Altered Skin Integrity Present or Discovered   [] LDA Added if Not in Epic (Describe Wound)   [] New Altered Skin Integrity was Present on Admit and Documented in LDA   [] Wound Image Taken    Wound Care Consulted? No    Attending Nurse:  Ivett Marinelli RN/Staff Member:  alber

## 2024-06-16 LAB
ANION GAP SERPL CALC-SCNC: 7 MEQ/L
BUN SERPL-MCNC: 16.7 MG/DL (ref 8.4–25.7)
CALCIUM SERPL-MCNC: 9.2 MG/DL (ref 8.8–10)
CHLORIDE SERPL-SCNC: 102 MMOL/L (ref 98–107)
CO2 SERPL-SCNC: 30 MMOL/L (ref 23–31)
CREAT SERPL-MCNC: 1.02 MG/DL (ref 0.73–1.18)
CREAT/UREA NIT SERPL: 16
GFR SERPLBLD CREATININE-BSD FMLA CKD-EPI: >60 ML/MIN/1.73/M2
GLUCOSE SERPL-MCNC: 99 MG/DL (ref 82–115)
POTASSIUM SERPL-SCNC: 4.4 MMOL/L (ref 3.5–5.1)
SODIUM SERPL-SCNC: 139 MMOL/L (ref 136–145)

## 2024-06-16 PROCEDURE — 25000003 PHARM REV CODE 250

## 2024-06-16 PROCEDURE — 11000001 HC ACUTE MED/SURG PRIVATE ROOM

## 2024-06-16 PROCEDURE — 63600175 PHARM REV CODE 636 W HCPCS: Performed by: INTERNAL MEDICINE

## 2024-06-16 PROCEDURE — 25000003 PHARM REV CODE 250: Performed by: INTERNAL MEDICINE

## 2024-06-16 PROCEDURE — 80048 BASIC METABOLIC PNL TOTAL CA: CPT | Performed by: INTERNAL MEDICINE

## 2024-06-16 PROCEDURE — 36415 COLL VENOUS BLD VENIPUNCTURE: CPT | Performed by: INTERNAL MEDICINE

## 2024-06-16 RX ORDER — METHOCARBAMOL 500 MG/1
500 TABLET, FILM COATED ORAL 4 TIMES DAILY PRN
Status: DISCONTINUED | OUTPATIENT
Start: 2024-06-16 | End: 2024-06-17 | Stop reason: HOSPADM

## 2024-06-16 RX ORDER — ESCITALOPRAM OXALATE 10 MG/1
10 TABLET ORAL DAILY
Status: DISCONTINUED | OUTPATIENT
Start: 2024-06-16 | End: 2024-06-17 | Stop reason: HOSPADM

## 2024-06-16 RX ORDER — GABAPENTIN 100 MG/1
100 CAPSULE ORAL 3 TIMES DAILY
Status: DISCONTINUED | OUTPATIENT
Start: 2024-06-16 | End: 2024-06-17 | Stop reason: HOSPADM

## 2024-06-16 RX ADMIN — BUTALBITAL, ACETAMINOPHEN, AND CAFFEINE 1 TABLET: 325; 50; 40 TABLET ORAL at 12:06

## 2024-06-16 RX ADMIN — ATORVASTATIN CALCIUM 80 MG: 40 TABLET, FILM COATED ORAL at 09:06

## 2024-06-16 RX ADMIN — GABAPENTIN 100 MG: 100 CAPSULE ORAL at 09:06

## 2024-06-16 RX ADMIN — GABAPENTIN 100 MG: 100 CAPSULE ORAL at 03:06

## 2024-06-16 RX ADMIN — BUTALBITAL, ACETAMINOPHEN, AND CAFFEINE 1 TABLET: 325; 50; 40 TABLET ORAL at 06:06

## 2024-06-16 RX ADMIN — ENOXAPARIN SODIUM 40 MG: 40 INJECTION SUBCUTANEOUS at 04:06

## 2024-06-16 RX ADMIN — ASPIRIN 81 MG: 81 TABLET, COATED ORAL at 08:06

## 2024-06-16 RX ADMIN — BUSPIRONE HYDROCHLORIDE 30 MG: 15 TABLET ORAL at 08:06

## 2024-06-16 RX ADMIN — METHOCARBAMOL 500 MG: 500 TABLET ORAL at 12:06

## 2024-06-16 RX ADMIN — METHOCARBAMOL 500 MG: 500 TABLET ORAL at 08:06

## 2024-06-16 RX ADMIN — Medication 6 MG: at 09:06

## 2024-06-16 RX ADMIN — ESCITALOPRAM OXALATE 10 MG: 10 TABLET ORAL at 03:06

## 2024-06-16 NOTE — PROGRESS NOTES
[facility]  Hospital Medicine Progress Note        Chief Complaint:  Left-sided weakness         HPI: (personally reviewed by me and is documented from initial H&P)   64 y.o. male who has a medical history of coronary artery disease, hypertension, BPH.       The patient presented to [facility] on 6/10/2024 with a primary complaint of left-sided weakness.  Patient transferred from [facility] for post thrombolytic monitoring in ICU.       Patient was cutting grass and developed acute left-sided weakness, worse in left leg.  Patient had no associated complaints.  He had a stroke greater than 30 years ago with no residual deficits reported.    He has no other complaints.  Review of systems otherwise discussed in noted to be negative.    Physical therapist has evaluated patient in the ICU and is recommending rehabilitation.    Left-sided upper and lower extremity weakness still significantly present.    Nurses present at bedside during my evaluation.    No family present at bedside.     MRI has been obtained but showed no acute abnormalities suggestive of acute ischemic event.  CTA of the chest showed no aortic abnormality and no pulmonary embolism however there is bilateral bibasilar atelectasis versus developing infiltrates some of which is consistent with COPD.Mri of the cervical spine reviewed,  neurosurgery evaluated no indications for surgery at this time recommends rehab for left-sided weakness        Interval History:          Patient seen and examined at bedside, no family member at bedside   Patient had reported to nurse he is having tingling and numbness that is bothersome, also having a low mood in the mornings  Potassium back to 4.4, within normal limits           Assessment and Plan       Acute stress disorder versus major depression    Left-sided paresthesia  Left-sided upper and lower extremity weakness ? Cause . Mri brain negative for stroke   Hypokalemia,  resolved  Coronary artery disease   Hypotension   BPH  History of CVA without residual deficits  Above present on admission         Anticipated discharge and Disposition when medically stable:  Rehabilitation     Therapy:  PT/OT     Nutrition: Cardiac diet as tolerated      ___________________________________________________________________________________________________________________________________        Begin p.o. Lexapro 10 mg daily to help with his mood   Begin gabapentin 100 mg t.i.d. for paresthesia, titrate upwards slowly   Change Robaxin to q.i.d. p.r.n. for muscle spasms or pain   Potassium is back to normal   Left-sided weakness, findings consistent with cerebrovascular accident however MRI and CT of the head is negative.    Mri of the cervical spine reviewed, nsgy no surgical interventions warranted, rec rehab   Neurology recs for rehab , likely mri negative cva   Continue supportive care  Continue checking vital signs q4hrs.  Continue to monitor vitals sign and neuro checks q.4 hours, unless otherwise specified.   Prn fioricet for HA  Prn hydroxyzine for anxiety   Continue telemetry  Provide supplemental oxygen if needed for oxygen saturation less than 90-92%   Monitor blood glucose and avoid hypo/hyperglycemia, sliding scale insulin as needed        DVT prophylaxis initiated    Currently awaiting rehab placement     Consults:   I have personally reviewed the specialist documentation and/or have spoken to the specialist with regard to the care of this patient; recommendations are noted above.            All diagnosis and differential diagnosis have been reviewed,  interpreted and communicated appropriately to care team. assessment and plan has been documented; I have personally reviewed the labs and test results that are presently available and pertinent to this hospital course; I have reviewed medical records based upon their availability.     All of the patient's questions have been  addressed and  answered. Patient's is agreeable to the above stated plan.   I will continue to monitor closely and make adjustments to medical management as needed.      dispo- rehab    VITAL SIGNS: 24 HRS MIN & MAX LAST   Temp  Min: 97.7 °F (36.5 °C)  Max: 98.7 °F (37.1 °C) 98.1 °F (36.7 °C)   BP  Min: 118/90  Max: 135/84 (!) 134/109   Pulse  Min: 80  Max: 91  89   Resp  Min: 14  Max: 27 (!) 22   SpO2  Min: 97 %  Max: 99 % 98 %     I have reviewed the following labs:  Recent Labs   Lab 06/10/24  1527   WBC 9.39   RBC 5.02   HGB 15.0   HCT 46.2   MCV 92.0   MCH 29.9   MCHC 32.5*   RDW 12.7      MPV 10.8*     Recent Labs   Lab 06/13/24  0238 06/14/24  0235 06/15/24  0646 06/16/24  0237    139 140 139   K 3.8 4.0 5.2* 4.4    105 104 102   CO2 27 28 27 30   BUN 10.5 11.5 11.6 16.7   CREATININE 0.89 0.92 0.91 1.02   CALCIUM 9.3 9.0 9.3 9.2   MG 1.90 2.00 2.00  --    ALBUMIN 3.6 3.6 3.8  --    ALKPHOS 50 48 53  --    ALT 59* 61* 79*  --    AST 27 31 46*  --    BILITOT 0.4 0.5 0.5  --      Microbiology Results (last 7 days)       ** No results found for the last 168 hours. **             See below for Radiology    Scheduled Med:   aspirin  81 mg Oral Daily    atorvastatin  80 mg Oral QHS    busPIRone  30 mg Oral Daily    enoxparin  40 mg Subcutaneous Q24H (prophylaxis, 1700)    methocarbamoL  500 mg Oral QID      Continuous Infusions:     PRN Meds:    Current Facility-Administered Medications:     acetaminophen, 650 mg, Oral, Q4H PRN    bisacodyL, 10 mg, Rectal, Daily PRN    butalbital-acetaminophen-caffeine -40 mg, 1 tablet, Oral, Q4H PRN    hydrOXYzine HCL, 25 mg, Oral, TID PRN    melatonin, 6 mg, Oral, Nightly PRN    ondansetron, 4 mg, Intravenous, Q8H PRN    prochlorperazine, 5 mg, Intravenous, Q6H PRN    sodium chloride 0.9%, 10 mL, Intravenous, PRN    sodium chloride 0.9%, 10 mL, Intravenous, PRN     Assessment/Plan:      VTE prophylaxis:     Patient condition:  Stable/Fair/Guarded/ Serious/  Critical    Anticipated discharge and Disposition:         All diagnosis and differential diagnosis have been reviewed; assessment and plan has been documented; I have personally reviewed the labs and test results that are presently available; I have reviewed the patients medication list; I have reviewed the consulting providers response and recommendations. I have reviewed or attempted to review medical records based upon their availability    All of the patient's questions have been  addressed and answered. Patient's is agreeable to the above stated plan. I will continue to monitor closely and make adjustments to medical management as needed.  _____________________________________________________________________    Nutrition Status:    Radiology:  I have personally reviewed the following imaging and agree with the radiologist.     MRI Cervical Spine Without Contrast  EXAMINATION  MRI CERVICAL SPINE WITHOUT CONTRAST    CLINICAL HISTORY  Neck pain, acute, prior cervical surgery;Spinal stenosis, cervical;    TECHNIQUE  Multiplanar, multisequence MR images of the cervical spine were obtained without the intravenous administration of gadolinium-based contrast media.    COMPARISON  None available at the time of initial interpretation.    FINDINGS  Exam quality: adequate for evaluation    There are notable degenerative osseous and disc space changes throughout the cervical spine, as discussed level-by-level below.  No evidence of acute vertebral body compression deformity or traumatic column malalignment is identified.  There is no convincing focal or destructive bony lesion.    C1-2: No evidence of acute cortical irregularity, marrow edema, or malalignment. No significant stenosis.    C2-3: Degenerative disc desiccation and minimal intervertebral height loss noted, with associated mild bilateral uncovertebral and facet arthrosis.  Moderate broad-based posterior disc protrusion is appreciated midline, resulting in mild  narrowing of the spinal canal.  Mild-to-moderate bilateral osseous foraminal stenosis also appreciated (right > left).    C3-4: There is moderate degenerative intervertebral disc height loss and signal changes of desiccation, as well as notable bilateral uncovertebral and facet arthrosis.  Mild to moderate broad-based posterior disc protrusion is also appreciated, with slight narrowing of the spinal canal.  Mild bilateral osseous foraminal narrowing.    C4-5: Moderate to severe degenerative intervertebral disc height loss is present, with slight broad-based posterior disc protrusion that produces mild spinal canal stenosis.  Mild-to-moderate bilateral osseous foraminal narrowing also appreciated.    C5-6: Moderate to severe intervertebral degenerative disc height loss with slight broad-based posterior disc protrusion and resulting mild spinal canal stenosis.  There is mild-to-moderate bilateral osseous foraminal narrowing.    C6-7: Notable degenerative disc height loss with left predominant broad-based posterior disc protrusion and focal mild-to-moderate spinal canal stenosis.  Bilateral osseous foraminal narrowing also appreciated (left > right).    C7-T1: No acute cortical displacement, suspicious marrow signal changes, or column malalignment. No significant disc protrusion or stenosis.    Spinal cord: The cervical spinal cord is without evidence of gross compression or acute signal alteration.    Other findings: No focal abnormality of the visualized paraspinal and neck soft tissues is appreciated.  Limited assessment of the posterior cranial fossa and thoracic inlet is unremarkable.    IMPRESSION  1. Degenerative changes throughout the cervical spine with associated multifocal mild-to-moderate spinal canal and bilateral foraminal stenosis, more detailed level-by-level description provided above.  2. No convincing evidence of acute spinal column abnormality or cord compression.    Electronically signed by: Royce  Maulik  Date:    06/13/2024  Time:    18:23      Radha Arciniega MD  Department of Hospital Medicine   [facility]   06/16/2024

## 2024-06-16 NOTE — NURSING
Nurses Note -- 4 Eyes      6/16/2024   5:58 PM      Skin assessed during: Transfer      [x] No Altered Skin Integrity Present    []Prevention Measures Documented      [] Yes- Altered Skin Integrity Present or Discovered   [] LDA Added if Not in Epic (Describe Wound)   [] New Altered Skin Integrity was Present on Admit and Documented in LDA   [] Wound Image Taken    Wound Care Consulted? No    Attending Nurse:  Elicia Marinelli RN/Staff Member:  john Baeza

## 2024-06-16 NOTE — PLAN OF CARE
Problem: Adult Inpatient Plan of Care  Goal: Plan of Care Review  Outcome: Progressing  Goal: Patient-Specific Goal (Individualized)  Outcome: Progressing  Goal: Absence of Hospital-Acquired Illness or Injury  Outcome: Progressing  Goal: Optimal Comfort and Wellbeing  Outcome: Progressing  Goal: Readiness for Transition of Care  Outcome: Progressing     Problem: Stroke, Ischemic (Includes Transient Ischemic Attack)  Goal: Optimal Coping  Outcome: Progressing  Goal: Effective Bowel Elimination  Outcome: Progressing  Goal: Optimal Cerebral Tissue Perfusion  Outcome: Progressing  Goal: Optimal Cognitive Function  Outcome: Progressing  Goal: Improved Communication Skills  Outcome: Progressing  Goal: Optimal Functional Ability  Outcome: Progressing  Goal: Optimal Nutrition Intake  Outcome: Progressing  Goal: Effective Oxygenation and Ventilation  Outcome: Progressing  Goal: Improved Sensorimotor Function  Outcome: Progressing  Goal: Safe and Effective Swallow  Outcome: Progressing  Goal: Effective Urinary Elimination  Outcome: Progressing     Problem: Fall Injury Risk  Goal: Absence of Fall and Fall-Related Injury  Outcome: Progressing     Problem: Skin Injury Risk Increased  Goal: Skin Health and Integrity  Outcome: Progressing      Nurses Note -- 4 Eyes      6/15/2024   8:47 PM      Skin assessed during: Q Shift Change      [x] No Altered Skin Integrity Present    [x]Prevention Measures Documented      [] Yes- Altered Skin Integrity Present or Discovered   [] LDA Added if Not in Epic (Describe Wound)   [] New Altered Skin Integrity was Present on Admit and Documented in LDA   [] Wound Image Taken    Wound Care Consulted? No    Attending Nurse:  Anali Bruce RN    Second RN/Staff Member:  Kirsty Danielle RN

## 2024-06-17 VITALS
HEART RATE: 74 BPM | BODY MASS INDEX: 26.36 KG/M2 | WEIGHT: 212 LBS | OXYGEN SATURATION: 97 % | DIASTOLIC BLOOD PRESSURE: 87 MMHG | TEMPERATURE: 98 F | HEIGHT: 75 IN | SYSTOLIC BLOOD PRESSURE: 134 MMHG | RESPIRATION RATE: 16 BRPM

## 2024-06-17 LAB
ANTINUCLEAR ANTIBODY SCREEN (OHS): NEGATIVE
CENTROMERE QUANT (OHS): 0.4 U/ML
DSDNA AB QUANT (OHS): 3.3 IU/ML
JO-1 AB QUANT (OHS): <0.3 U/ML
RNP70 AB QUANT (OHS): 1.1 U/ML
SCL-70S AB QUANT (OHS): 1.4 U/ML
SMITH AB QUANT (OHS): <0.7 U/ML
SSA(RO) AB QUANT (OHS): 0.5 U/ML
SSB(LA) AB QUANT (OHS): <0.4 U/ML
U1RNP AB QUANT (OHS): 1.2 U/ML

## 2024-06-17 PROCEDURE — 25000003 PHARM REV CODE 250: Performed by: INTERNAL MEDICINE

## 2024-06-17 PROCEDURE — 25000003 PHARM REV CODE 250

## 2024-06-17 PROCEDURE — 97530 THERAPEUTIC ACTIVITIES: CPT | Mod: CO

## 2024-06-17 RX ORDER — GABAPENTIN 100 MG/1
100 CAPSULE ORAL 3 TIMES DAILY
Qty: 90 CAPSULE | Refills: 11 | Status: SHIPPED | OUTPATIENT
Start: 2024-06-17 | End: 2025-06-17

## 2024-06-17 RX ORDER — BUTALBITAL, ACETAMINOPHEN AND CAFFEINE 50; 325; 40 MG/1; MG/1; MG/1
1 TABLET ORAL EVERY 4 HOURS PRN
Qty: 30 TABLET | Refills: 1 | Status: SHIPPED | OUTPATIENT
Start: 2024-06-17 | End: 2024-07-17

## 2024-06-17 RX ORDER — METHOCARBAMOL 500 MG/1
500 TABLET, FILM COATED ORAL 4 TIMES DAILY PRN
Qty: 40 TABLET | Refills: 0 | Status: SHIPPED | OUTPATIENT
Start: 2024-06-17 | End: 2024-06-27

## 2024-06-17 RX ORDER — ESCITALOPRAM OXALATE 10 MG/1
10 TABLET ORAL DAILY
Qty: 90 TABLET | Refills: 3 | Status: SHIPPED | OUTPATIENT
Start: 2024-06-17 | End: 2025-06-17

## 2024-06-17 RX ORDER — ATORVASTATIN CALCIUM 80 MG/1
80 TABLET, FILM COATED ORAL NIGHTLY
Qty: 90 TABLET | Refills: 3 | Status: SHIPPED | OUTPATIENT
Start: 2024-06-17 | End: 2025-06-17

## 2024-06-17 RX ADMIN — ESCITALOPRAM OXALATE 10 MG: 10 TABLET ORAL at 09:06

## 2024-06-17 RX ADMIN — BUSPIRONE HYDROCHLORIDE 30 MG: 15 TABLET ORAL at 09:06

## 2024-06-17 RX ADMIN — METHOCARBAMOL 500 MG: 500 TABLET ORAL at 09:06

## 2024-06-17 RX ADMIN — ASPIRIN 81 MG: 81 TABLET, COATED ORAL at 09:06

## 2024-06-17 RX ADMIN — BUTALBITAL, ACETAMINOPHEN, AND CAFFEINE 1 TABLET: 325; 50; 40 TABLET ORAL at 09:06

## 2024-06-17 RX ADMIN — GABAPENTIN 100 MG: 100 CAPSULE ORAL at 09:06

## 2024-06-17 NOTE — PROGRESS NOTES
Inpatient Nutrition Evaluation    Admit Date: 6/10/2024   Total duration of encounter: 7 days   Patient Age: 64 y.o.    Nutrition Recommendation/Prescription     Continue Diet Heart Healthy as tolerated.     Nutrition Assessment     Chart Review    Reason Seen: length of stay    Malnutrition Screening Tool Results   Have you recently lost weight without trying?: No  Have you been eating poorly because of a decreased appetite?: No   MST Score: 0   Diagnosis:  Acute stress disorder versus major depression    Left-sided paresthesia  Left-sided upper and lower extremity weakness  Coronary artery disease   Hypotension     Relevant Medical History: CVA without residual deficits, CAD, HTN, BPH    Scheduled Medications:  aspirin, 81 mg, Daily  atorvastatin, 80 mg, QHS  busPIRone, 30 mg, Daily  enoxparin, 40 mg, Q24H (prophylaxis, 1700)  EScitalopram oxalate, 10 mg, Daily  gabapentin, 100 mg, TID    Continuous Infusions:   PRN Medications:   Current Facility-Administered Medications:     acetaminophen, 650 mg, Oral, Q4H PRN    bisacodyL, 10 mg, Rectal, Daily PRN    butalbital-acetaminophen-caffeine -40 mg, 1 tablet, Oral, Q4H PRN    hydrOXYzine HCL, 25 mg, Oral, TID PRN    melatonin, 6 mg, Oral, Nightly PRN    methocarbamoL, 500 mg, Oral, QID PRN    ondansetron, 4 mg, Intravenous, Q8H PRN    prochlorperazine, 5 mg, Intravenous, Q6H PRN    sodium chloride 0.9%, 10 mL, Intravenous, PRN    sodium chloride 0.9%, 10 mL, Intravenous, PRN    Recent Labs   Lab 06/10/24  1527 06/10/24  1528 06/11/24  0311 06/12/24  0336 06/13/24  0238 06/13/24  1053 06/14/24  0235 06/15/24  0646 06/16/24  0237     --  138 139 139  --  139 140 139   K 4.4  --  4.2 4.6 3.8  --  4.0 5.2* 4.4   CALCIUM 9.2  --  9.4 9.6 9.3  --  9.0 9.3 9.2   PHOS  --   --  3.2 3.9 3.1  --  3.5 3.3  --    MG  --   --  1.90 1.90 1.90  --  2.00 2.00  --    CO2 25  --  26 28 27  --  28 27 30   BUN 10.5  --  11.5 14.0 10.5  --  11.5 11.6 16.7   CREATININE 0.87  --  " 0.93 0.99 0.89  --  0.92 0.91 1.02   EGFRNORACEVR >60  --  >60 >60 >60  --  >60 >60 >60   GLUCOSE 89  --  101 99 113  --  97 93 99   BILITOT 0.9  --  1.1 0.6 0.4  --  0.5 0.5  --    ALKPHOS 52  --  51 49 50  --  48 53  --    ALT 77*  --  79* 64* 59*  --  61* 79*  --    AST 43*  --  44* 30 27  --  31 46*  --    ALBUMIN 3.9  --  3.7 3.7 3.6  --  3.6 3.8  --    CRP  --   --   --   --   --  1.10  --   --   --    TRIG  --  84  --   --   --   --   --   --   --    HGBA1C  --  5.6  --   --   --   --   --   --   --    WBC 9.39  --   --   --   --   --   --   --   --    HGB 15.0  --   --   --   --   --   --   --   --    HCT 46.2  --   --   --   --   --   --   --   --      Nutrition Orders:  Diet Heart Healthy    Appetite/Oral Intake: good/% of meals  Factors Affecting Nutritional Intake: none identified  Food/Nondenominational/Cultural Preferences: none reported  Food Allergies: none reported  Last Bowel Movement: 06/15/24  Wound(s):      Comments    24 Pt reports good appetite and intake of meals. Denies any GI complaints. UBW ~200lbs. States plans to transfer to rehab today.     Anthropometrics  Height: 6' 3" (190.5 cm),    Last Weight: 96.2 kg (212 lb) (24 0800), Weight Method: Bed Scale  BMI (Calculated): 26.5  BMI Classification: overweight (BMI 25-29.9)        Ideal Body Weight (IBW), Male: 196 lb     % Ideal Body Weight, Male (lb): 108.16 %                 Usual Body Weight (UBW), k.9 kg  % Usual Body Weight: 106.01     Usual Weight Provided By: patient    Wt Readings from Last 5 Encounters:   24 96.2 kg (212 lb)     Weight Change(s) Since Admission:   Wt Readings from Last 1 Encounters:   24 0800 96.2 kg (212 lb)   06/10/24 1500 96.2 kg (212 lb)   Admit Weight: 96.2 kg (212 lb) (06/10/24 1500), Weight Method: Estimated    Patient Education     Not applicable.    Nutrition Goals & Monitoring     Dietitian will monitor: energy intake    Nutrition Risk/Follow-Up: low (follow-up in 5-7 " days)  Patients assigned 'low nutrition risk' status do not qualify for a full nutritional assessment but will be monitored and re-evaluated in a 5-7 day time period. Please consult if re-evaluation needed sooner.

## 2024-06-17 NOTE — DISCHARGE SUMMARY
[facility]  Hospital Medicine Discharge Summary    Admit Date: 6/10/2024  Discharge Date and Time: 6/17/20241:33 PM  Admitting Physician:  Team  Discharging Physician: Radha Arciniega MD.  Primary Care Physician: Royce Canchola MD  Consults: Hospital Medicine, Neurology, and Neurosurgery    Discharge Diagnoses:  Acute stress disorder versus major depression    Left-sided paresthesia  Left-sided upper and lower extremity weakness , Mri brain negative for stroke   Hypokalemia, resolved  Coronary artery disease   Hypotension , resolved   BPH  History of CVA without residual deficits      Hospital Course:   Chief Complaint:  Left-sided weakness         HPI: (personally reviewed by me and is documented from initial H&P)   64 y.o. male who has a medical history of coronary artery disease, hypertension, BPH.       The patient presented to [facility] on 6/10/2024 with a primary complaint of left-sided weakness.  Patient transferred from [facility] for post thrombolytic monitoring in ICU.       Patient was cutting grass and developed acute left-sided weakness, worse in left leg.  Patient had no associated complaints.  He had a stroke greater than 30 years ago with no residual deficits reported.    He has no other complaints.  Review of systems otherwise discussed in noted to be negative.    Physical therapist has evaluated patient in the ICU and is recommending rehabilitation.    Left-sided upper and lower extremity weakness still significantly present.    Nurses present at bedside during my evaluation.    No family present at bedside.     MRI has been obtained but showed no acute abnormalities suggestive of acute ischemic event.  CTA of the chest showed no aortic abnormality and no pulmonary embolism however there is bilateral bibasilar atelectasis versus developing infiltrates some of which is consistent with COPD.Mri of the cervical spine reviewed,  neurosurgery evaluated no  indications for surgery at this time recommends rehab for left-sided weakness  Neurology evaluated and recommended aspirin, statin, rehab   Patient started on gabapentin for neuropathy, and lexapro for depression on 6/16         Pt was seen and examined on the day of discharge      Vitals:  VITAL SIGNS: 24 HRS MIN & MAX LAST   Temp  Min: 97.5 °F (36.4 °C)  Max: 98.2 °F (36.8 °C) 97.5 °F (36.4 °C)   BP  Min: 120/74  Max: 160/78 134/87   Pulse  Min: 64  Max: 74  74   Resp  Min: 9  Max: 18 16   SpO2  Min: 92 %  Max: 98 % 97 %     Procedures Performed: No admission procedures for hospital encounter.     Significant Diagnostic Studies: See Full reports for all details    Recent Labs   Lab 06/10/24  1527   WBC 9.39   RBC 5.02   HGB 15.0   HCT 46.2   MCV 92.0   MCH 29.9   MCHC 32.5*   RDW 12.7      MPV 10.8*       Recent Labs   Lab 06/13/24  0238 06/14/24  0235 06/15/24  0646 06/16/24  0237    139 140 139   K 3.8 4.0 5.2* 4.4    105 104 102   CO2 27 28 27 30   BUN 10.5 11.5 11.6 16.7   CREATININE 0.89 0.92 0.91 1.02   CALCIUM 9.3 9.0 9.3 9.2   MG 1.90 2.00 2.00  --    ALBUMIN 3.6 3.6 3.8  --    ALKPHOS 50 48 53  --    ALT 59* 61* 79*  --    AST 27 31 46*  --    BILITOT 0.4 0.5 0.5  --         Microbiology Results (last 7 days)       ** No results found for the last 168 hours. **             MRI Cervical Spine Without Contrast  EXAMINATION  MRI CERVICAL SPINE WITHOUT CONTRAST    CLINICAL HISTORY  Neck pain, acute, prior cervical surgery;Spinal stenosis, cervical;    TECHNIQUE  Multiplanar, multisequence MR images of the cervical spine were obtained without the intravenous administration of gadolinium-based contrast media.    COMPARISON  None available at the time of initial interpretation.    FINDINGS  Exam quality: adequate for evaluation    There are notable degenerative osseous and disc space changes throughout the cervical spine, as discussed level-by-level below.  No evidence of acute vertebral body  compression deformity or traumatic column malalignment is identified.  There is no convincing focal or destructive bony lesion.    C1-2: No evidence of acute cortical irregularity, marrow edema, or malalignment. No significant stenosis.    C2-3: Degenerative disc desiccation and minimal intervertebral height loss noted, with associated mild bilateral uncovertebral and facet arthrosis.  Moderate broad-based posterior disc protrusion is appreciated midline, resulting in mild narrowing of the spinal canal.  Mild-to-moderate bilateral osseous foraminal stenosis also appreciated (right > left).    C3-4: There is moderate degenerative intervertebral disc height loss and signal changes of desiccation, as well as notable bilateral uncovertebral and facet arthrosis.  Mild to moderate broad-based posterior disc protrusion is also appreciated, with slight narrowing of the spinal canal.  Mild bilateral osseous foraminal narrowing.    C4-5: Moderate to severe degenerative intervertebral disc height loss is present, with slight broad-based posterior disc protrusion that produces mild spinal canal stenosis.  Mild-to-moderate bilateral osseous foraminal narrowing also appreciated.    C5-6: Moderate to severe intervertebral degenerative disc height loss with slight broad-based posterior disc protrusion and resulting mild spinal canal stenosis.  There is mild-to-moderate bilateral osseous foraminal narrowing.    C6-7: Notable degenerative disc height loss with left predominant broad-based posterior disc protrusion and focal mild-to-moderate spinal canal stenosis.  Bilateral osseous foraminal narrowing also appreciated (left > right).    C7-T1: No acute cortical displacement, suspicious marrow signal changes, or column malalignment. No significant disc protrusion or stenosis.    Spinal cord: The cervical spinal cord is without evidence of gross compression or acute signal alteration.    Other findings: No focal abnormality of the  visualized paraspinal and neck soft tissues is appreciated.  Limited assessment of the posterior cranial fossa and thoracic inlet is unremarkable.    IMPRESSION  1. Degenerative changes throughout the cervical spine with associated multifocal mild-to-moderate spinal canal and bilateral foraminal stenosis, more detailed level-by-level description provided above.  2. No convincing evidence of acute spinal column abnormality or cord compression.    Electronically signed by: Royce Willingham  Date:    06/13/2024  Time:    18:23         Medication List        START taking these medications      atorvastatin 80 MG tablet  Commonly known as: LIPITOR  Take 1 tablet (80 mg total) by mouth every evening.     butalbital-acetaminophen-caffeine -40 mg -40 mg per tablet  Commonly known as: FIORICET, ESGIC  Take 1 tablet by mouth every 4 (four) hours as needed for Headaches.     EScitalopram oxalate 10 MG tablet  Commonly known as: LEXAPRO  Take 1 tablet (10 mg total) by mouth once daily.     gabapentin 100 MG capsule  Commonly known as: NEURONTIN  Take 1 capsule (100 mg total) by mouth 3 (three) times daily.     methocarbamoL 500 MG Tab  Commonly known as: ROBAXIN  Take 1 tablet (500 mg total) by mouth 4 (four) times daily as needed (pain).            CONTINUE taking these medications      aspirin 81 MG EC tablet  Commonly known as: ECOTRIN     busPIRone 30 MG Tab  Commonly known as: BUSPAR     cholecalciferol (vitamin D3) 50 mcg (2,000 unit) Cap capsule  Commonly known as: VITAMIN D3     cyanocobalamin 1000 MCG tablet  Commonly known as: VITAMIN B-12     desonide 0.05 % cream  Commonly known as: DESOWEN     fish oil-omega-3 fatty acids 300-1,000 mg capsule     ondansetron 4 MG Tbdl  Commonly known as: ZOFRAN-ODT            STOP taking these medications      amLODIPine 5 MG tablet  Commonly known as: NORVASC     bisoprolol 5 MG tablet  Commonly known as: ZEBETA     clopidogreL 75 mg tablet  Commonly known as: PLAVIX      hydrOXYzine HCL 25 MG tablet  Commonly known as: ATARAX     oxyCODONE-acetaminophen 5-325 mg per tablet  Commonly known as: PERCOCET     pravastatin 20 MG tablet  Commonly known as: PRAVACHOL     tamsulosin 0.4 mg Cap  Commonly known as: FLOMAX               Where to Get Your Medications        These medications were sent to [facility] [place] - [address]  [address], [place] [address]      Phone: [phone]   atorvastatin 80 MG tablet  butalbital-acetaminophen-caffeine -40 mg -40 mg per tablet  EScitalopram oxalate 10 MG tablet  gabapentin 100 MG capsule  methocarbamoL 500 MG Tab          Explained in detail to the patient about the discharge plan, medications, and follow-up visits. Pt understands and agrees with the treatment plan  Discharge Disposition: Rehab Facility   Discharged Condition: stable  Diet-   Dietary Orders (From admission, onward)       Start     Ordered    06/11/24 0722  Diet Heart Healthy  Diet effective now         06/11/24 0724                   Medications Per DC med rec  Activities as tolerated   Follow-up Information       oMra Evangelista FNP Follow up in 3 month(s).    Specialty: Neurology  Why: Follow up in stroke clinic within 3 months of hospital discharge  Contact information:  [address] [place] [address] [address] [place] [address] [phone]               Royce Canchola MD. Schedule an appointment as soon as possible for a visit in 4 week(s).    Specialty: Internal Medicine  Contact information:  [address] [place] [address] [phone]                           For further questions contact hospitalist office    Discharge time 33 minutes    For worsening symptoms, chest pain, shortness of breath, increased abdominal pain, high grade fever, stroke or stroke like symptoms, immediately go to the nearest Emergency Room or call 911 as soon as possible.      Radha Ceballos M.D, on  6/17/2024. at 1:33 PM.

## 2024-06-17 NOTE — PLAN OF CARE
Problem: Adult Inpatient Plan of Care  Goal: Plan of Care Review  Outcome: Met  Goal: Patient-Specific Goal (Individualized)  Outcome: Met  Goal: Absence of Hospital-Acquired Illness or Injury  Outcome: Met  Goal: Optimal Comfort and Wellbeing  Outcome: Met  Goal: Readiness for Transition of Care  Outcome: Met     Problem: Stroke, Ischemic (Includes Transient Ischemic Attack)  Goal: Optimal Coping  Outcome: Met  Goal: Effective Bowel Elimination  Outcome: Met  Goal: Optimal Cerebral Tissue Perfusion  Outcome: Met  Goal: Optimal Cognitive Function  Outcome: Met  Goal: Improved Communication Skills  Outcome: Met  Goal: Optimal Functional Ability  Outcome: Met  Goal: Optimal Nutrition Intake  Outcome: Met  Goal: Effective Oxygenation and Ventilation  Outcome: Met  Goal: Improved Sensorimotor Function  Outcome: Met  Goal: Safe and Effective Swallow  Outcome: Met  Goal: Effective Urinary Elimination  Outcome: Met     Problem: Fall Injury Risk  Goal: Absence of Fall and Fall-Related Injury  Outcome: Met     Problem: Skin Injury Risk Increased  Goal: Skin Health and Integrity  Outcome: Met     Problem: Occupational Therapy  Goal: Occupational Therapy Goal  Description: Goals to be met by: 24     Patient will increase functional independence with ADLs by performing:    UE Dressing with Supervision.  LE Dressing with Supervision.  Grooming while standing at sink with Supervision.  Toileting from toilet with Supervision for hygiene and clothing management.   Toilet transfer to toilet with Supervision.  Increased functional strength to 4/5 through therEx, neuromuscular re-ed  Pt will visually scan L side of environment with no verbal cues.    Outcome: Met     Problem: Physical Therapy  Goal: Physical Therapy Goal  Description: Goals to be met by: 24     Patient will increase functional independence with mobility by performin. Supine to sit with [place]  2. Sit to supine with [place]  3. Sit to  stand transfer with Stand-by Assistance  4. Gait  x 150 feet with Stand-by Assistance using LRAD.   5. Increased functional strength to 5/5 for L LE.    Outcome: Met

## 2024-06-17 NOTE — PLAN OF CARE
Dr Arciniega notified patient insurance authorization received by [facility] Inpatient rehab.Discharge orders placed per Dr ROYA Arciniega. KENNY Hebert at [facility] notified patient is discharged to their facility. Emilee requested if Joana could transport patient to facility. They do not have transport van. CM phoned Amanda in Case Management to arrange for patient to be transported to [facility]. Joana can  patient at 11:00 am to transport to [facility] Report can be called to Charge Nurse at 10:30 am to [phone]. Stefanie , patient's nurse notified of this. Ivett, unit secretary to put patient is transport to pick patient up from unit and bring down by ED to meet Joana by 11:00 am.,

## 2024-06-17 NOTE — PLAN OF CARE
KENNY spoke to patient's spouse ,Lanny informed her that patient is discharged to [facility] via [place] in transport van. Patient is being picked up at 11:00 am.

## 2024-06-17 NOTE — PT/OT/SLP PROGRESS
Occupational Therapy   Treatment    Name: Edison Jacobo  MRN: [id number]  Admitting Diagnosis:  Stroke determined by clinical assessment       Recommendations:     Recommended therapy intensity at discharge: High Intensity Therapy   Discharge Equipment Recommendations:  to be determined by next level of care  Barriers to discharge:       Assessment:     Edison Jacobo is a 64 y.o. male with a medical diagnosis of Stroke determined by clinical assessment.  He presents with great ax tolerance and motivation to participate. Performance deficits affecting function are weakness, impaired endurance, impaired self care skills, impaired functional mobility, impaired balance. Limited by weakness in LLE/LUE. Would greatly benefit from high intensity therapy at d/c.     Rehab Prognosis:  Good; patient would benefit from acute skilled OT services to address these deficits and reach maximum level of function.       Plan:     Patient to be seen 6 x/week to address the above listed problems via self-care/home management, therapeutic exercises, neuromuscular re-education, therapeutic activities  Plan of Care Expires:    Plan of Care Reviewed with: patient    Subjective     Pain/Comfort:  Pain Rating 1: 0/10    Objective:     Communicated with: RN prior to session.  Patient found supine with telemetry upon OT entry to room.    General Precautions: Standard, fall    Orthopedic Precautions:N/A  Braces: N/A  Respiratory Status: Room air  Vital Signs: Blood Pressure: 128/80     Occupational Performance:     Bed Mobility:    Patient completed Scooting/Bridging with contact guard assistance  Patient completed Supine to Sit with contact guard assistance     Functional Mobility/Transfers:  Patient completed Sit <> Stand Transfer with moderate assistance and of 2 persons  with  rolling walker  x3 trials from EOB. LLE blocked 2/2 buckling.   Functional Mobility: attempted steps along EOB however pt unable to advance BLE and L knee noted to  ward. Performed stand pivot t/f from bed<>chair with Mod A x2.     Therapeutic Activities:  2x20 reps Wb'ing onto LUE hand and elbow with Min A to push into midline.   1x20 reps hand to hand translation of sponge for increased SHIMA coordination  1x20 reps squeezing sponge for  strengthening of L hand.   AAROM of LUE all planes and joints.     Therapeutic Positioning    OT interventions performed during the course of today's session in an effort to prevent and/or reduce acquired pressure injuries:   Education was provided on benefits of and recommendations for therapeutic positioning    [facility] 6 Click ADL:      Patient Education:  Patient provided with verbal education education regarding OT role/goals/POC, fall prevention, and safety awareness.  Understanding was verbalized.      Patient left up in chair with all lines intact and call button in reach.    GOALS:   Multidisciplinary Problems       Occupational Therapy Goals          Problem: Occupational Therapy    Goal Priority Disciplines Outcome Interventions   Occupational Therapy Goal     OT, PT/OT Progressing    Description: Goals to be met by: 7/11/24     Patient will increase functional independence with ADLs by performing:    UE Dressing with Supervision.  LE Dressing with Supervision.  Grooming while standing at sink with Supervision.  Toileting from toilet with Supervision for hygiene and clothing management.   Toilet transfer to toilet with Supervision.  Increased functional strength to 4/5 through therEx, neuromuscular re-ed  Pt will visually scan L side of environment with no verbal cues.                         Time Tracking:     OT Date of Treatment: 06/17/24  OT Start Time: 0904  OT Stop Time: 0930  OT Total Time (min): 26 min    Billable Minutes:Therapeutic Activity 26    OT/OLYA: OLYA     Number of OLYA visits since last OT visit: 4    6/17/2024

## 2024-06-17 NOTE — NURSING
Called report to KIMBERLY Conway at [facility]. IV left in per nurse request. All belongings packed and given to patient. Packet at bedside. Waiting on transport.

## 2024-06-17 NOTE — PLAN OF CARE
06/17/24 1117   Final Note   Assessment Type Final Discharge Note   Anticipated Discharge Disposition Rehab   Post-Acute Status   Post-Acute Authorization Placement   Post-Acute Placement Status Set-up Complete/Auth obtained   Coverage [facility]   Patient choice form signed by patient/caregiver List from CMS Compare   Discharge Delays None known at this time

## 2024-08-27 ENCOUNTER — OFFICE VISIT (OUTPATIENT)
Dept: NEUROLOGY | Facility: CLINIC | Age: 64
End: 2024-08-27
Payer: COMMERCIAL

## 2024-08-27 VITALS
BODY MASS INDEX: 26.37 KG/M2 | HEIGHT: 75 IN | DIASTOLIC BLOOD PRESSURE: 106 MMHG | HEART RATE: 92 BPM | SYSTOLIC BLOOD PRESSURE: 149 MMHG | WEIGHT: 212.06 LBS

## 2024-08-27 DIAGNOSIS — G47.30 SLEEP-DISORDERED BREATHING: ICD-10-CM

## 2024-08-27 DIAGNOSIS — F41.9 ANXIETY: ICD-10-CM

## 2024-08-27 DIAGNOSIS — I63.89 OTHER CEREBRAL INFARCTION: Primary | ICD-10-CM

## 2024-08-27 DIAGNOSIS — R51.9 NONINTRACTABLE HEADACHE, UNSPECIFIED CHRONICITY PATTERN, UNSPECIFIED HEADACHE TYPE: ICD-10-CM

## 2024-08-27 DIAGNOSIS — E78.5 HYPERLIPIDEMIA, UNSPECIFIED HYPERLIPIDEMIA TYPE: ICD-10-CM

## 2024-08-27 PROCEDURE — 99999 PR PBB SHADOW E&M-EST. PATIENT-LVL IV: CPT | Mod: PBBFAC,,, | Performed by: NURSE PRACTITIONER

## 2024-08-27 RX ORDER — ATORVASTATIN CALCIUM 40 MG/1
40 TABLET, FILM COATED ORAL DAILY
Qty: 30 TABLET | Refills: 3 | Status: SHIPPED | OUTPATIENT
Start: 2024-08-27 | End: 2025-08-27

## 2024-08-27 RX ORDER — TOPIRAMATE 25 MG/1
25 TABLET ORAL 2 TIMES DAILY
Qty: 60 EACH | Refills: 11 | Status: SHIPPED | OUTPATIENT
Start: 2024-08-27 | End: 2025-08-27

## 2024-08-27 RX ORDER — ALPRAZOLAM 0.25 MG/1
0.25 TABLET ORAL NIGHTLY PRN
Qty: 15 TABLET | Refills: 0 | Status: SHIPPED | OUTPATIENT
Start: 2024-08-27 | End: 2024-09-26

## 2024-08-27 NOTE — PROGRESS NOTES
Subjective:      Patient ID: Edison Jacobo is a 64 y.o. male.    Chief Complaint:  Hospital Follow Up (Hospital f/u. Dx: MRI comp neg for stroke, repeat MRI after visit. Patient c/o headaches in the middle of head, lasting all day. Patient c/o dizziness when moving too fast, weakness on left side, slurred speech, fatige. )        History of Present Illness  Patient presents for hospital follow up after stroke work up. Patient has history of hypertension, hyperlipidemia, CAD, myocardial infarction status post stent (2022), stroke without residual deficits (approximate 20 years ago). Patient presented to [facility] with reports of sudden onset of left sided weakness while mowing his grass. CT head showed punctate area of old lacunar infarct in the genu of the corpus callosum.CT chest negative for dissection or aneurysm.  He was given TNK and transferred to [facility] [place]. MRI brain was negative for stroke. Echo negative. Patient is still with left sided weakness. Has been evaluated by neurosurgery with no cause of weakness found. There is some concern for a brain stem stroke.   Today, patient is still experiencing left sided weakness. Also reports dizziness and slurred speech at times. Reports he is very fatigued. Does not have as much energy as he did prior to stroke. He is unable to work at the present time. Patient reports he is not sleeping and feels anxious. States lexapro and buspar have been ineffective for anxiety. Has also tried zoloft in the past.          No past medical history on file.    No past surgical history on file.    No family history on file.    Social History     Socioeconomic History    Marital status:      Social Determinants of Health     Financial Resource Strain: Low Risk  (6/12/2024)    Overall Financial Resource Strain (CARDIA)     Difficulty of Paying Living Expenses: Not hard at all   Food Insecurity: No Food Insecurity (6/12/2024)    Hunger Vital Sign     Worried About Running Out of Food in  the Last Year: Never true     Ran Out of Food in the Last Year: Never true   Transportation Needs: No Transportation Needs (6/12/2024)    TRANSPORTATION NEEDS     Transportation : No   Stress: No Stress Concern Present (6/12/2024)    [employer] [place] [employer] - Occupational Stress Questionnaire     Feeling of Stress : Not at all   Housing Stability: Low Risk  (6/12/2024)    Housing Stability Vital Sign     Unable to Pay for Housing in the Last Year: No     Homeless in the Last Year: No       Current Outpatient Medications   Medication Sig Dispense Refill    aspirin (ECOTRIN) 81 MG EC tablet Take 81 mg by mouth.      cholecalciferol, vitamin D3, (VITAMIN D3) 50 mcg (2,000 unit) Cap capsule Take 25 mcg by mouth.      cyanocobalamin (VITAMIN B-12) 1000 MCG tablet Take 1 tablet by mouth every morning.      desonide (DESOWEN) 0.05 % cream APPLY 1 APPLICATION SPARINGLY TOPICALLY TO THE AFFECTED AREA OF THE FACE AND FOREHEAD ONCE A DAY 3 TIMES A WEEK      gabapentin (NEURONTIN) 100 MG capsule Take 1 capsule (100 mg total) by mouth 3 (three) times daily. 90 capsule 11    omega-3 fatty acids/fish oil (FISH OIL-OMEGA-3 FATTY ACIDS) 300-1,000 mg capsule Take 2 g by mouth.      ALPRAZolam (XANAX) 0.25 MG tablet Take 1 tablet (0.25 mg total) by mouth nightly as needed for Anxiety. 15 tablet 0    atorvastatin (LIPITOR) 40 MG tablet Take 1 tablet (40 mg total) by mouth once daily. 30 tablet 3    ondansetron (ZOFRAN-ODT) 4 MG TbDL DISSOLVE ONE TABLET UNDER THE TONGUE EVERY 6 HOURS (Patient not taking: Reported on 8/27/2024)      topiramate (TOPAMAX) 25 MG tablet Take 1 tablet (25 mg total) by mouth 2 (two) times daily. 60 each 11     No current facility-administered medications for this visit.       Review of patient's allergies indicates:   Allergen Reactions    Lortab [hydrocodone-acetaminophen] Other (See Comments)     GI upset        Vitals:    08/27/24 1356 08/27/24 1357   BP: (!) 140/110 (!) 149/106   BP  "Location: Left arm Left arm   Patient Position: Sitting Sitting   BP Method: Medium (Automatic)    Pulse: 92    Weight: 96.2 kg (212 lb 1.3 oz)    Height: 6' 3" (1.905 m)         Review of Systems  12 point ROS performed and negative unless otherwise documented in HPI  Objective:     Neurologic Exam     Mental Status   Oriented to person, place, and time.   Speech: speech is normal   Level of consciousness: alert    Cranial Nerves   Cranial nerves II through XII intact.     Motor Exam Left side hemiparesis     Sensory Exam   Light touch normal.     Gait, Coordination, and Reflexes Ambulates with a cane         Physical Exam  Vitals reviewed.   Pulmonary:      Effort: Pulmonary effort is normal.   Neurological:      Mental Status: He is oriented to person, place, and time.      Cranial Nerves: Cranial nerves 2-12 are intact.   Psychiatric:         Speech: Speech normal.            Assessment:     1. Other cerebral infarction    2. Sleep-disordered breathing    3. Nonintractable headache, unspecified chronicity pattern, unspecified headache type    4. Hyperlipidemia, unspecified hyperlipidemia type    5. Anxiety          Modified [place] Scale 3  Plan:   CTA head/neck  MRI brain w/ wout contrast w/ SWI  HST ordered   Topiramate 25 mg BID for headache; will plan to increase in the future  Rx for atorvastatin for HLD; goal LDL <70  Xanax 0.25 mg prn anxiety  Secondary stroke prevention measures discussed. Education provided on signs and symptoms of stroke; advised to call 9-1-1 with any new onset of numbness, tingling or weakness, difficulty with speech or facial droop.     "

## 2024-09-09 ENCOUNTER — TELEPHONE (OUTPATIENT)
Dept: NEUROLOGY | Facility: CLINIC | Age: 64
End: 2024-09-09
Payer: COMMERCIAL

## 2024-09-09 PROBLEM — I63.9 STROKE DETERMINED BY CLINICAL ASSESSMENT: Status: RESOLVED | Noted: 2024-06-10 | Resolved: 2024-09-09

## 2024-09-17 ENCOUNTER — TELEPHONE (OUTPATIENT)
Dept: NEUROLOGY | Facility: CLINIC | Age: 64
End: 2024-09-17
Payer: COMMERCIAL

## 2024-09-17 RX ORDER — DULOXETIN HYDROCHLORIDE 30 MG/1
30 CAPSULE, DELAYED RELEASE ORAL DAILY
Qty: 30 CAPSULE | Refills: 11 | Status: SHIPPED | OUTPATIENT
Start: 2024-09-17 | End: 2025-09-17

## 2024-09-17 NOTE — TELEPHONE ENCOUNTER
Cymbalta sent in for patient. He needs to try for 4-6 weeks. Can increase medication in 2 weeks if no effect.

## 2024-09-17 NOTE — TELEPHONE ENCOUNTER
Patient states he is out of Xanax and is inquiring if he can get something for anxiety it doesn't have to be xanax. He states he is still feeling nervous.

## 2024-10-28 ENCOUNTER — OFFICE VISIT (OUTPATIENT)
Dept: NEUROLOGY | Facility: CLINIC | Age: 64
End: 2024-10-28
Payer: COMMERCIAL

## 2024-10-28 VITALS
HEIGHT: 75 IN | SYSTOLIC BLOOD PRESSURE: 132 MMHG | HEART RATE: 59 BPM | BODY MASS INDEX: 26.37 KG/M2 | DIASTOLIC BLOOD PRESSURE: 92 MMHG | WEIGHT: 212.06 LBS

## 2024-10-28 DIAGNOSIS — F41.9 ANXIETY: ICD-10-CM

## 2024-10-28 DIAGNOSIS — I63.9 STROKE DETERMINED BY CLINICAL ASSESSMENT: Primary | ICD-10-CM

## 2024-10-28 DIAGNOSIS — M79.2 NEUROPATHIC PAIN: ICD-10-CM

## 2024-10-28 DIAGNOSIS — G47.00 INSOMNIA, UNSPECIFIED TYPE: ICD-10-CM

## 2024-10-28 PROCEDURE — 99999 PR PBB SHADOW E&M-EST. PATIENT-LVL III: CPT | Mod: PBBFAC,,, | Performed by: NURSE PRACTITIONER

## 2024-10-28 RX ORDER — DULOXETIN HYDROCHLORIDE 60 MG/1
60 CAPSULE, DELAYED RELEASE ORAL DAILY
Qty: 30 CAPSULE | Refills: 11 | Status: SHIPPED | OUTPATIENT
Start: 2024-10-28 | End: 2025-10-28

## 2024-10-28 RX ORDER — GABAPENTIN 600 MG/1
600 TABLET ORAL 3 TIMES DAILY
Qty: 90 TABLET | Refills: 11 | Status: SHIPPED | OUTPATIENT
Start: 2024-10-28 | End: 2025-10-28

## 2024-10-28 RX ORDER — TRAZODONE HYDROCHLORIDE 50 MG/1
50 TABLET ORAL NIGHTLY
Qty: 30 TABLET | Refills: 11 | Status: SHIPPED | OUTPATIENT
Start: 2024-10-28 | End: 2025-10-28

## 2025-01-01 DIAGNOSIS — I63.89 OTHER CEREBRAL INFARCTION: Primary | ICD-10-CM

## 2025-01-01 RX ORDER — ATORVASTATIN CALCIUM 40 MG/1
40 TABLET, FILM COATED ORAL
Qty: 30 TABLET | Refills: 3 | Status: SHIPPED | OUTPATIENT
Start: 2025-01-01

## 2025-02-10 ENCOUNTER — OFFICE VISIT (OUTPATIENT)
Dept: NEUROLOGY | Facility: CLINIC | Age: 65
End: 2025-02-10
Payer: MEDICARE

## 2025-02-10 VITALS — DIASTOLIC BLOOD PRESSURE: 107 MMHG | HEART RATE: 79 BPM | SYSTOLIC BLOOD PRESSURE: 150 MMHG

## 2025-02-10 DIAGNOSIS — F41.9 ANXIETY: ICD-10-CM

## 2025-02-10 DIAGNOSIS — M79.2 NEUROPATHIC PAIN: ICD-10-CM

## 2025-02-10 DIAGNOSIS — I63.9 STROKE DETERMINED BY CLINICAL ASSESSMENT: Primary | ICD-10-CM

## 2025-02-10 DIAGNOSIS — G43.909 EPISODIC MIGRAINE: ICD-10-CM

## 2025-02-10 PROCEDURE — 99999 PR PBB SHADOW E&M-EST. PATIENT-LVL III: CPT | Mod: PBBFAC,,, | Performed by: NURSE PRACTITIONER

## 2025-02-10 PROCEDURE — 1159F MED LIST DOCD IN RCRD: CPT | Mod: CPTII,S$GLB,, | Performed by: NURSE PRACTITIONER

## 2025-02-10 PROCEDURE — 1160F RVW MEDS BY RX/DR IN RCRD: CPT | Mod: CPTII,S$GLB,, | Performed by: NURSE PRACTITIONER

## 2025-02-10 PROCEDURE — 1101F PT FALLS ASSESS-DOCD LE1/YR: CPT | Mod: CPTII,S$GLB,, | Performed by: NURSE PRACTITIONER

## 2025-02-10 PROCEDURE — 3080F DIAST BP >= 90 MM HG: CPT | Mod: CPTII,S$GLB,, | Performed by: NURSE PRACTITIONER

## 2025-02-10 PROCEDURE — 3288F FALL RISK ASSESSMENT DOCD: CPT | Mod: CPTII,S$GLB,, | Performed by: NURSE PRACTITIONER

## 2025-02-10 PROCEDURE — 3077F SYST BP >= 140 MM HG: CPT | Mod: CPTII,S$GLB,, | Performed by: NURSE PRACTITIONER

## 2025-02-10 PROCEDURE — 99214 OFFICE O/P EST MOD 30 MIN: CPT | Mod: S$GLB,,, | Performed by: NURSE PRACTITIONER

## 2025-02-10 RX ORDER — AMITRIPTYLINE HYDROCHLORIDE 25 MG/1
25 TABLET, FILM COATED ORAL NIGHTLY
Qty: 30 TABLET | Refills: 11 | Status: SHIPPED | OUTPATIENT
Start: 2025-02-10 | End: 2026-02-10

## 2025-02-10 RX ORDER — RIMEGEPANT SULFATE 75 MG/75MG
75 TABLET, ORALLY DISINTEGRATING ORAL DAILY PRN
Qty: 16 TABLET | Refills: 2 | Status: SHIPPED | OUTPATIENT
Start: 2025-02-10 | End: 2025-02-14

## 2025-02-10 NOTE — PROGRESS NOTES
Subjective:      Patient ID: Edison Jacobo is a 65 y.o. male.    Chief Complaint:  Stroke (3 month follow up. Patient states medications for headaches and anxiety isn't working )      History of Present Illness  Patient presents for follow up of stroke. He has a history of hypertension, hyperlipidemia, CAD, myocardial infarction status post stent (2022), stroke without residual deficits (approximate 20 years ago). CT head showed punctate area of old lacunar infarct in the genu of the corpus callosum.CT chest negative for dissection or aneurysm.  He was given TNK and transferred to [facility] [place] MRI brain was negative for stroke. . Patient is still with left sided weakness. Has been evaluated by neurosurgery with no cause of weakness found. There is some concern for a brain stem stroke.   Today, patient reports he is still experiencing left sided weakness. He reports his neuropathic pain to his left side wakes him up at night. Trazadone was helpful for sleep. Headaches are not occurring as often. May occur every other day. Has some nausea with headache. Patient reports Cymbalta has not been helpful for his headaches or his anxiety. He has been on lexapro, buspar and zoloft in the past for his anxiety all of which were not effective. Tylenol is not helpful at reducing headache.           History reviewed. No pertinent past medical history.    History reviewed. No pertinent surgical history.    No family history on file.    Social History     Socioeconomic History    Marital status:    Tobacco Use    Smoking status: Every Day     Types: Pipe     Start date: 2008    Smokeless tobacco: Never     Social Drivers of Health     Financial Resource Strain: Low Risk  (6/12/2024)    Overall Financial Resource Strain (CARDIA)     Difficulty of Paying Living Expenses: Not hard at all   Food Insecurity: No Food Insecurity (6/12/2024)    Hunger Vital Sign     Worried About Running Out of Food in the Last Year: Never true     Ran Out  of Food in the Last Year: Never true   Transportation Needs: No Transportation Needs (6/12/2024)    TRANSPORTATION NEEDS     Transportation : No   Stress: No Stress Concern Present (6/12/2024)    [employer] [place] [employer] - Occupational Stress Questionnaire     Feeling of Stress : Not at all   Housing Stability: Low Risk  (6/12/2024)    Housing Stability Vital Sign     Unable to Pay for Housing in the Last Year: No     Homeless in the Last Year: No       Current Outpatient Medications   Medication Sig Dispense Refill    aspirin (ECOTRIN) 81 MG EC tablet Take 81 mg by mouth.      atorvastatin (LIPITOR) 40 MG tablet TAKE 1 TABLET(40 MG) BY MOUTH DAILY 30 tablet 3    cholecalciferol, vitamin D3, (VITAMIN D3) 50 mcg (2,000 unit) Cap capsule Take 25 mcg by mouth.      cyanocobalamin (VITAMIN B-12) 1000 MCG tablet Take 1 tablet by mouth every morning.      desonide (DESOWEN) 0.05 % cream APPLY 1 APPLICATION SPARINGLY TOPICALLY TO THE AFFECTED AREA OF THE FACE AND FOREHEAD ONCE A DAY 3 TIMES A WEEK      gabapentin (NEURONTIN) 600 MG tablet Take 1 tablet (600 mg total) by mouth 3 (three) times daily. 90 tablet 11    omega-3 fatty acids/fish oil (FISH OIL-OMEGA-3 FATTY ACIDS) 300-1,000 mg capsule Take 2 g by mouth.      amitriptyline (ELAVIL) 25 MG tablet Take 1 tablet (25 mg total) by mouth every evening. 30 tablet 11    rimegepant (NURTEC) 75 mg odt Take 1 tablet (75 mg total) by mouth daily as needed for Migraine. Place ODT tablet on the tongue; alternatively the ODT tablet may be placed under the tongue 16 tablet 2     No current facility-administered medications for this visit.       Review of patient's allergies indicates:   Allergen Reactions    Lortab [hydrocodone-acetaminophen] Other (See Comments)     GI upset      Vitals:    02/10/25 1304   BP: (!) 150/107   BP Location: Left arm   Patient Position: Sitting   Pulse: 79      Review of Systems  12 point ROS performed and negative unless otherwise  documented in HPI  Objective:       Neurologic Exam      Mental Status   Oriented to person, place, and time.   Speech: speech is normal   Level of consciousness: alert     Cranial Nerves   Cranial nerves II through XII intact.      Motor Exam Left side hemiparesis      Sensory Exam   Light touch normal.      Gait, Coordination, and Reflexes Ambulates with a cane            Physical Exam  Vitals reviewed.   Pulmonary:      Effort: Pulmonary effort is normal.   Neurological:      Mental Status: He is oriented to person, place, and time.      Cranial Nerves: Cranial nerves 2-12 are intact.   Psychiatric:         Speech: Speech normal.      Assessment:     1. Stroke determined by clinical assessment    2. Neuropathic pain    3. Anxiety    4. Episodic migraine        Plan:   Continue ASA/statin for secondary stroke prevention  Decrease Cymbalta to 30 mg x 1 week then stop. Patient to follow up with PCP for management of anxiety.  Complaints of memory problems. Will decrease topiramate to 25 mg BID x 1 week then stop.  Stop trazodone. Start Amitriptyline 25 mg QHS to hopefully help with nerve pain  Based on AHA/ACC 2021 guidelines, patient's primary care doctor should target BP goal of <130/80 mm?Hg, LDL-C <70 mg/dL and HbA1c of <7% to minimize the risk of future strokes.    Secondary stroke prevention measures discussed. Education provided on signs and symptoms of stroke; advised to call 9-1-1 with any new onset of numbness, tingling or weakness, difficulty with speech or facial droop.     Referral to mental health provider at [facility]

## 2025-02-14 RX ORDER — UBROGEPANT 100 MG/1
100 TABLET ORAL
Qty: 16 TABLET | Refills: 2 | Status: SHIPPED | OUTPATIENT
Start: 2025-02-14

## 2025-05-04 DIAGNOSIS — I63.89 OTHER CEREBRAL INFARCTION: ICD-10-CM

## 2025-05-05 RX ORDER — ATORVASTATIN CALCIUM 40 MG/1
40 TABLET, FILM COATED ORAL
Qty: 30 TABLET | Refills: 3 | Status: SHIPPED | OUTPATIENT
Start: 2025-05-05

## 2025-05-20 ENCOUNTER — HOSPITAL ENCOUNTER (INPATIENT)
Facility: HOSPITAL | Age: 65
LOS: 6 days | Discharge: NURSING, REHAB OR LONG-TERM CARE | DRG: 103 | End: 2025-05-26
Attending: INTERNAL MEDICINE | Admitting: INTERNAL MEDICINE
Payer: MEDICARE

## 2025-05-20 DIAGNOSIS — R53.1 LEFT-SIDED WEAKNESS: Primary | ICD-10-CM

## 2025-05-20 DIAGNOSIS — R29.818 ACUTE FOCAL NEUROLOGICAL DEFICIT: ICD-10-CM

## 2025-05-20 DIAGNOSIS — R51.9 ACUTE NONINTRACTABLE HEADACHE, UNSPECIFIED HEADACHE TYPE: ICD-10-CM

## 2025-05-20 PROBLEM — F17.200 TOBACCO DEPENDENCY: Status: ACTIVE | Noted: 2025-05-20

## 2025-05-20 LAB
ALBUMIN SERPL-MCNC: 3.9 G/DL (ref 3.4–4.8)
ALBUMIN/GLOB SERPL: 1.3 RATIO (ref 1.1–2)
ALP SERPL-CCNC: 65 UNIT/L (ref 40–150)
ALT SERPL-CCNC: 60 UNIT/L (ref 0–55)
ANION GAP SERPL CALC-SCNC: 6 MEQ/L
AST SERPL-CCNC: 34 UNIT/L (ref 11–45)
BASOPHILS # BLD AUTO: 0.09 X10(3)/MCL
BASOPHILS NFR BLD AUTO: 1.2 %
BILIRUB SERPL-MCNC: 0.7 MG/DL
BNP BLD-MCNC: 12.5 PG/ML
BUN SERPL-MCNC: 17.8 MG/DL (ref 8.4–25.7)
CALCIUM SERPL-MCNC: 9 MG/DL (ref 8.8–10)
CHLORIDE SERPL-SCNC: 110 MMOL/L (ref 98–107)
CHOLEST SERPL-MCNC: 119 MG/DL
CHOLEST/HDLC SERPL: 4 {RATIO} (ref 0–5)
CO2 SERPL-SCNC: 22 MMOL/L (ref 23–31)
CREAT SERPL-MCNC: 0.92 MG/DL (ref 0.72–1.25)
CREAT/UREA NIT SERPL: 19
EOSINOPHIL # BLD AUTO: 0.61 X10(3)/MCL (ref 0–0.9)
EOSINOPHIL NFR BLD AUTO: 8.2 %
ERYTHROCYTE [DISTWIDTH] IN BLOOD BY AUTOMATED COUNT: 12.9 % (ref 11.5–17)
GFR SERPLBLD CREATININE-BSD FMLA CKD-EPI: >60 ML/MIN/1.73/M2
GLOBULIN SER-MCNC: 3 GM/DL (ref 2.4–3.5)
GLUCOSE SERPL-MCNC: 94 MG/DL (ref 82–115)
GLUCOSE SERPL-MCNC: 96 MG/DL (ref 70–110)
HCT VFR BLD AUTO: 43.7 % (ref 42–52)
HDLC SERPL-MCNC: 32 MG/DL (ref 35–60)
HGB BLD-MCNC: 14.2 G/DL (ref 14–18)
IMM GRANULOCYTES # BLD AUTO: 0.02 X10(3)/MCL (ref 0–0.04)
IMM GRANULOCYTES NFR BLD AUTO: 0.3 %
INR PPP: 1.1
LDLC SERPL CALC-MCNC: 75 MG/DL (ref 50–140)
LYMPHOCYTES # BLD AUTO: 1.98 X10(3)/MCL (ref 0.6–4.6)
LYMPHOCYTES NFR BLD AUTO: 26.5 %
MAGNESIUM SERPL-MCNC: 2 MG/DL (ref 1.6–2.6)
MCH RBC QN AUTO: 29.4 PG (ref 27–31)
MCHC RBC AUTO-ENTMCNC: 32.5 G/DL (ref 33–36)
MCV RBC AUTO: 90.5 FL (ref 80–94)
MONOCYTES # BLD AUTO: 0.69 X10(3)/MCL (ref 0.1–1.3)
MONOCYTES NFR BLD AUTO: 9.2 %
NEUTROPHILS # BLD AUTO: 4.07 X10(3)/MCL (ref 2.1–9.2)
NEUTROPHILS NFR BLD AUTO: 54.6 %
NRBC BLD AUTO-RTO: 0 %
OHS QRS DURATION: 102 MS
OHS QTC CALCULATION: 437 MS
PLATELET # BLD AUTO: 181 X10(3)/MCL (ref 130–400)
PMV BLD AUTO: 10.7 FL (ref 7.4–10.4)
POC PTINR: 1.5 (ref 0.9–1.2)
POTASSIUM SERPL-SCNC: 4.3 MMOL/L (ref 3.5–5.1)
PROT SERPL-MCNC: 6.9 GM/DL (ref 5.8–7.6)
PROTHROMBIN TIME: 14.5 SECONDS (ref 12.5–14.5)
RBC # BLD AUTO: 4.83 X10(6)/MCL (ref 4.7–6.1)
SAMPLE: ABNORMAL
SODIUM SERPL-SCNC: 138 MMOL/L (ref 136–145)
TRIGL SERPL-MCNC: 61 MG/DL (ref 34–140)
TROPONIN I SERPL-MCNC: <0.01 NG/ML (ref 0–0.04)
TSH SERPL-ACNC: 0.65 UIU/ML (ref 0.35–4.94)
VLDLC SERPL CALC-MCNC: 12 MG/DL
WBC # BLD AUTO: 7.46 X10(3)/MCL (ref 4.5–11.5)

## 2025-05-20 PROCEDURE — 63600175 PHARM REV CODE 636 W HCPCS

## 2025-05-20 PROCEDURE — 84484 ASSAY OF TROPONIN QUANT: CPT | Performed by: INTERNAL MEDICINE

## 2025-05-20 PROCEDURE — 63600175 PHARM REV CODE 636 W HCPCS: Performed by: INTERNAL MEDICINE

## 2025-05-20 PROCEDURE — 80061 LIPID PANEL: CPT | Performed by: INTERNAL MEDICINE

## 2025-05-20 PROCEDURE — 27000221 HC OXYGEN, UP TO 24 HOURS

## 2025-05-20 PROCEDURE — 80053 COMPREHEN METABOLIC PANEL: CPT | Performed by: INTERNAL MEDICINE

## 2025-05-20 PROCEDURE — 83735 ASSAY OF MAGNESIUM: CPT | Performed by: INTERNAL MEDICINE

## 2025-05-20 PROCEDURE — 25000003 PHARM REV CODE 250: Performed by: INTERNAL MEDICINE

## 2025-05-20 PROCEDURE — 96374 THER/PROPH/DIAG INJ IV PUSH: CPT

## 2025-05-20 PROCEDURE — 92610 EVALUATE SWALLOWING FUNCTION: CPT

## 2025-05-20 PROCEDURE — 85025 COMPLETE CBC W/AUTO DIFF WBC: CPT | Performed by: INTERNAL MEDICINE

## 2025-05-20 PROCEDURE — 21400001 HC TELEMETRY ROOM

## 2025-05-20 PROCEDURE — 93005 ELECTROCARDIOGRAM TRACING: CPT

## 2025-05-20 PROCEDURE — 85610 PROTHROMBIN TIME: CPT | Performed by: INTERNAL MEDICINE

## 2025-05-20 PROCEDURE — 25500020 PHARM REV CODE 255: Performed by: INTERNAL MEDICINE

## 2025-05-20 PROCEDURE — 99285 EMERGENCY DEPT VISIT HI MDM: CPT | Mod: 25

## 2025-05-20 PROCEDURE — A9577 INJ MULTIHANCE: HCPCS | Performed by: INTERNAL MEDICINE

## 2025-05-20 PROCEDURE — 11000001 HC ACUTE MED/SURG PRIVATE ROOM

## 2025-05-20 PROCEDURE — 84443 ASSAY THYROID STIM HORMONE: CPT | Performed by: INTERNAL MEDICINE

## 2025-05-20 PROCEDURE — 93010 ELECTROCARDIOGRAM REPORT: CPT | Mod: ,,, | Performed by: INTERNAL MEDICINE

## 2025-05-20 PROCEDURE — 99223 1ST HOSP IP/OBS HIGH 75: CPT | Mod: ,,, | Performed by: SPECIALIST

## 2025-05-20 PROCEDURE — 80048 BASIC METABOLIC PNL TOTAL CA: CPT | Mod: XB

## 2025-05-20 PROCEDURE — 83880 ASSAY OF NATRIURETIC PEPTIDE: CPT | Performed by: INTERNAL MEDICINE

## 2025-05-20 PROCEDURE — 82962 GLUCOSE BLOOD TEST: CPT

## 2025-05-20 RX ORDER — TRAZODONE HYDROCHLORIDE 50 MG/1
50 TABLET ORAL NIGHTLY
Status: DISCONTINUED | OUTPATIENT
Start: 2025-05-20 | End: 2025-05-20

## 2025-05-20 RX ORDER — ASPIRIN 300 MG/1
300 SUPPOSITORY RECTAL DAILY
Status: DISCONTINUED | OUTPATIENT
Start: 2025-05-20 | End: 2025-05-21

## 2025-05-20 RX ORDER — SODIUM CHLORIDE 0.9 % (FLUSH) 0.9 %
10 SYRINGE (ML) INJECTION
Status: DISCONTINUED | OUTPATIENT
Start: 2025-05-20 | End: 2025-05-26 | Stop reason: HOSPADM

## 2025-05-20 RX ORDER — TRAZODONE HYDROCHLORIDE 50 MG/1
50 TABLET ORAL NIGHTLY
COMMUNITY

## 2025-05-20 RX ORDER — TOPIRAMATE 25 MG/1
25 TABLET, FILM COATED ORAL 2 TIMES DAILY
COMMUNITY

## 2025-05-20 RX ORDER — ATORVASTATIN CALCIUM 40 MG/1
40 TABLET, FILM COATED ORAL DAILY
Status: DISCONTINUED | OUTPATIENT
Start: 2025-05-20 | End: 2025-05-20

## 2025-05-20 RX ORDER — ONDANSETRON HYDROCHLORIDE 2 MG/ML
4 INJECTION, SOLUTION INTRAVENOUS
Status: COMPLETED | OUTPATIENT
Start: 2025-05-20 | End: 2025-05-20

## 2025-05-20 RX ORDER — ATORVASTATIN CALCIUM 40 MG/1
40 TABLET, FILM COATED ORAL DAILY
Status: DISCONTINUED | OUTPATIENT
Start: 2025-05-21 | End: 2025-05-20

## 2025-05-20 RX ORDER — GABAPENTIN 300 MG/1
600 CAPSULE ORAL 3 TIMES DAILY
Status: DISCONTINUED | OUTPATIENT
Start: 2025-05-21 | End: 2025-05-20

## 2025-05-20 RX ORDER — ACETAMINOPHEN 10 MG/ML
1000 INJECTION, SOLUTION INTRAVENOUS ONCE
Status: COMPLETED | OUTPATIENT
Start: 2025-05-20 | End: 2025-05-20

## 2025-05-20 RX ORDER — CLOPIDOGREL BISULFATE 75 MG/1
75 TABLET ORAL DAILY
Status: DISCONTINUED | OUTPATIENT
Start: 2025-05-20 | End: 2025-05-26 | Stop reason: HOSPADM

## 2025-05-20 RX ORDER — MORPHINE SULFATE 4 MG/ML
4 INJECTION, SOLUTION INTRAMUSCULAR; INTRAVENOUS
Refills: 0 | Status: COMPLETED | OUTPATIENT
Start: 2025-05-20 | End: 2025-05-20

## 2025-05-20 RX ORDER — BISACODYL 10 MG/1
10 SUPPOSITORY RECTAL DAILY PRN
Status: DISCONTINUED | OUTPATIENT
Start: 2025-05-20 | End: 2025-05-26 | Stop reason: HOSPADM

## 2025-05-20 RX ORDER — CLOPIDOGREL BISULFATE 75 MG/1
75 TABLET ORAL DAILY
COMMUNITY

## 2025-05-20 RX ORDER — BISOPROLOL FUMARATE 5 MG/1
5 TABLET, FILM COATED ORAL DAILY
COMMUNITY

## 2025-05-20 RX ORDER — AMITRIPTYLINE HYDROCHLORIDE 25 MG/1
25 TABLET, FILM COATED ORAL NIGHTLY
Status: DISCONTINUED | OUTPATIENT
Start: 2025-05-20 | End: 2025-05-20

## 2025-05-20 RX ORDER — ENOXAPARIN SODIUM 100 MG/ML
40 INJECTION SUBCUTANEOUS EVERY 24 HOURS
Status: DISCONTINUED | OUTPATIENT
Start: 2025-05-21 | End: 2025-05-26 | Stop reason: HOSPADM

## 2025-05-20 RX ORDER — DULOXETIN HYDROCHLORIDE 30 MG/1
30 CAPSULE, DELAYED RELEASE ORAL DAILY
COMMUNITY

## 2025-05-20 RX ORDER — SODIUM CHLORIDE, SODIUM LACTATE, POTASSIUM CHLORIDE, CALCIUM CHLORIDE 600; 310; 30; 20 MG/100ML; MG/100ML; MG/100ML; MG/100ML
INJECTION, SOLUTION INTRAVENOUS CONTINUOUS
Status: DISCONTINUED | OUTPATIENT
Start: 2025-05-20 | End: 2025-05-21

## 2025-05-20 RX ORDER — MORPHINE SULFATE 4 MG/ML
2 INJECTION, SOLUTION INTRAMUSCULAR; INTRAVENOUS EVERY 6 HOURS PRN
Refills: 0 | Status: DISCONTINUED | OUTPATIENT
Start: 2025-05-21 | End: 2025-05-21

## 2025-05-20 RX ORDER — TOPIRAMATE 25 MG/1
25 TABLET, FILM COATED ORAL 2 TIMES DAILY
Status: DISCONTINUED | OUTPATIENT
Start: 2025-05-20 | End: 2025-05-20

## 2025-05-20 RX ORDER — ASPIRIN 81 MG/1
81 TABLET ORAL DAILY
Status: DISCONTINUED | OUTPATIENT
Start: 2025-05-20 | End: 2025-05-20

## 2025-05-20 RX ORDER — VIT C/E/ZN/COPPR/LUTEIN/ZEAXAN 250MG-90MG
1000 CAPSULE ORAL EVERY MORNING
Status: DISCONTINUED | OUTPATIENT
Start: 2025-05-21 | End: 2025-05-20

## 2025-05-20 RX ORDER — DULOXETIN HYDROCHLORIDE 30 MG/1
30 CAPSULE, DELAYED RELEASE ORAL DAILY
Status: DISCONTINUED | OUTPATIENT
Start: 2025-05-21 | End: 2025-05-20

## 2025-05-20 RX ORDER — IBUPROFEN 200 MG
1 TABLET ORAL DAILY
Status: DISCONTINUED | OUTPATIENT
Start: 2025-05-20 | End: 2025-05-26 | Stop reason: HOSPADM

## 2025-05-20 RX ORDER — LABETALOL HYDROCHLORIDE 5 MG/ML
10 INJECTION, SOLUTION INTRAVENOUS
Status: DISCONTINUED | OUTPATIENT
Start: 2025-05-20 | End: 2025-05-26 | Stop reason: HOSPADM

## 2025-05-20 RX ORDER — ROSUVASTATIN CALCIUM 40 MG/1
40 TABLET, COATED ORAL NIGHTLY
COMMUNITY

## 2025-05-20 RX ADMIN — MORPHINE SULFATE 2 MG: 4 INJECTION INTRAVENOUS at 11:05

## 2025-05-20 RX ADMIN — MORPHINE SULFATE 4 MG: 4 INJECTION INTRAVENOUS at 01:05

## 2025-05-20 RX ADMIN — ACETAMINOPHEN 1000 MG: 10 INJECTION, SOLUTION INTRAVENOUS at 09:05

## 2025-05-20 RX ADMIN — ACETAMINOPHEN 1000 MG: 10 INJECTION, SOLUTION INTRAVENOUS at 11:05

## 2025-05-20 RX ADMIN — IOHEXOL 94 ML: 350 INJECTION, SOLUTION INTRAVENOUS at 10:05

## 2025-05-20 RX ADMIN — SODIUM CHLORIDE, POTASSIUM CHLORIDE, SODIUM LACTATE AND CALCIUM CHLORIDE: 600; 310; 30; 20 INJECTION, SOLUTION INTRAVENOUS at 10:05

## 2025-05-20 RX ADMIN — ONDANSETRON 4 MG: 2 INJECTION INTRAMUSCULAR; INTRAVENOUS at 01:05

## 2025-05-20 RX ADMIN — ASPIRIN 300 MG: 300 SUPPOSITORY RECTAL at 10:05

## 2025-05-20 RX ADMIN — GADOBENATE DIMEGLUMINE 20 ML: 529 INJECTION, SOLUTION INTRAVENOUS at 04:05

## 2025-05-20 NOTE — CONSULTS
[facility] - Emergency Dept  Neurology  Consult Note      Edison Jacobo is a 65 y.o. male who presented to [facility] on 5/20/2025 with reports of paralysis of left arm(s), left hand(s), left finger(s), left upper leg(s), left lower leg(s), or left foot(feet), weakness of left face, left arm(s), left hand(s), left finger(s), left upper leg(s), left lower leg(s), or left foot(feet), left facial droop, slurred speech, loss of vision left, numbness or tingling of left hand(s), numbness or tingling of left foot(feet). Patient also has compliant of posterior headache. Onset of symptoms was began suddenly while mowing grass this morning at 900am. LKN 5/20/25 at 900am. Stroke risk factors include hyperlipidemia, hypertension, and , CAD, CVA about 20 years ago. Prior stroke history: yes, lacunar infarct in the genu of the corpus callosum.     On ASA 81, Plavix 75 mg daily, last dose 5/19/25 in evening.  Patient denies anticoagulants.       No past medical history on file.  No past surgical history on file.  No family history on file.  Social History[1]   Review of patient's allergies indicates:   Allergen Reactions    Lortab [hydrocodone-acetaminophen] Other (See Comments)     GI upset         STROKE DOCUMENTATION   Acute Stroke Times   Last Known Normal Date: 05/20/25  Last Known Normal Time: 0900  Symptom Onset Date: 05/20/25  Symptom Onset Time: 0900  Stroke Team Called Date: 05/20/25  Stroke Team Called Time: 1019  Stroke Team Arrival Date: 05/20/25  Stroke Team Arrival Time: 1121    NIH Scale:  1a. Level of Consciousness: 0-->Alert, keenly responsive  1b. LOC Questions: 0-->Answers both questions correctly  1c. LOC Commands: 0-->Performs both tasks correctly  2. Best Gaze: 0-->Normal  3. Visual: 1-->Partial hemianopia  4. Facial Palsy: 1-->Minor paralysis (flattened nasolabial fold, asymmetry on smiling)  5a. Motor Arm, Left: 4-->No movement  5b. Motor Arm, Right: 0-->No drift, limb holds 90 (or 45) degrees  for full 10 secs  6a. Motor Leg, Left: 4-->No movement  6b. Motor Leg, Right: 0-->No drift, leg holds 30 degree position for full 5 secs  7. Limb Ataxia: 0-->Absent  8. Sensory: 2-->Severe to total sensory loss, patient is not aware of being touched in the face, arm, and leg  9. Best Language: 0-->No aphasia, normal  10. Dysarthria: 1-->Mild-to-moderate dysarthria, patient slurs at least some words and, at worst, can be understood with some difficulty  11. Extinction and Inattention (formerly Neglect): 0-->No abnormality  Total (NIH Stroke Scale): 13     Modified Griggs Score: 0  [place] Coma Scale:    ABCD2 Score:    [id number] Score:   HAS -BLED Score:   ICH Score:   Hunt & Porras Classification:         Neurological Exam:   LOC: alert and follows requests  Language: No aphasia  Speech: Dysarthria  Orientation: Person, Place, Time  Memory: Recent memory intact, Remote memory intact, Age correct, Month correct  Visual Fields (CN II): Hemianopsia  left  Pupils (CN III, IV, VI): PERRL  Facial Sensation (CN V): Facial sensory loss left lower, Corneal reflex intact  Facial Movement (CN VII): lower weakness left lower  Tongue (CN XII): to midline  Motor*: Arm Left:  Plegic (0/5), Leg Left:   Plegic (0/5), Arm Right:   Normal (5/5), Leg Right:   Normal (5/5)  Sensation: rachael-hypoesthesia left face, arm and leg       Laboratory:  BMP:   Lab Results   Component Value Date     06/16/2024     07/29/2023    K 4.4 06/16/2024    K 4.3 07/29/2023     06/16/2024     07/29/2023    CO2 30 06/16/2024    CO2 30 07/29/2023    BUN 16.7 06/16/2024    BUN 14 07/29/2023    CREATININE 1.02 06/16/2024    CREATININE 0.86 07/29/2023    CALCIUM 9.2 06/16/2024    CALCIUM 8.7 (L) 07/29/2023     CBC:   Lab Results   Component Value Date    WBC 9.39 06/10/2024    RBC 5.02 06/10/2024    HGB 15.0 06/10/2024    HCT 46.2 06/10/2024     06/10/2024    MCV 92.0 06/10/2024    MCH 29.9 06/10/2024    MCHC 32.5 (L) 06/10/2024      Lipid Panel:   Lab Results   Component Value Date    CHOL 141 06/10/2024    HDL 35 06/10/2024    TRIG 84 06/10/2024     Coagulation:   Lab Results   Component Value Date    INR 1.1 07/28/2023     Hgb A1C:   Lab Results   Component Value Date    HGBA1C 5.6 06/10/2024     TSH:   Lab Results   Component Value Date    TSH 0.955 06/10/2024    TSH 1.778 07/27/2023       Diagnostic Results  Brain Imaging:   -CTh: IMPRESSION  1. No acute intracranial hemorrhage.  2. No findings to suggest large vascular territory acute ischemic insult.  -MRI brain limited: Impression:  1. No acute infarct.  2. Mild chronic microvascular ischemic changes.  Cerebrovascular Imaging:   -CTP: Impression:  No definite territorial perfusion mismatch.  -CTA h/n: Impression:  No evidence of large vessel occlusion.  Severe stenosis of the left vertebral artery origin without evidence of downstream stenosis.  Cardiac Evaluation:   -EKG/Telemetry:       Discussed with neurologist on call: Dr Coyne, 10:25am.  Thrombolysis Candidate? No, Strong suspicion for stroke mimic or alternative diagnosis   There was not profusion mismatch, no LVO.   Decision to proceed with MRI brain limited to determine if acute stroke before decision on TNK.  Discussed with Dr Coyne at 11:10am after review negative MRI brain, no TNK.   Patient had same presentation in June 2024 with negative MRI.   Patient advised at this time also of no acute stroke, no TNK advised.   Patient continued at 11:20 unable to move left UE/LE.   Patient had   -Delays to Thrombolysis?  Not Applicable    Interventional Revascularization Candidate? No; Large core infarct on imaging   -Delays to Thrombectomy? Not Applicable  Discussed with Interventional Neurology at 10:36am of stroke alert. No intervention, no LVO.       Assessment:   - no acute stroke/infarct.    - no definite territorial perfusion mismatch.  - no evidence of large vessel occlusion.  - severe stenosis of the left vertebral  artery origin without evidence of downstream stenosis.  - acute onset left UE/LE paralysis, left facial droop, slurred speech, left vision loss.     Plan:   - continued care with ED and/or hospital medicine  - stop smoking   - regarding left vertebral artery stenosis, Dr Aguiar reviewed imaging, recommendation to continue dual antiplatelet therapy and statin.   - continue home medication ASA 81 mg daily and Plavix 75 mg daily.   - continue Lipitor 40 mg daily.  - follow up in 1 month with Mora Evangelista NP, Dr Aguiar's out patient interventional neurology clinic for consideration for out patient DSA   - okay to normalize blood pressure    - other recommendations and follow up as per MD     Thank you for your consult.        Daxa Mirza NP  Neurology  [facility] - Emergency Dept           [1]   Social History  Tobacco Use    Smoking status: Every Day     Types: Pipe     Start date: 2008    Smokeless tobacco: Never

## 2025-05-20 NOTE — ED PROVIDER NOTES
Encounter Date: 5/20/2025    SCRIBE #1 NOTE: I, Wes Solomon, am scribing for, and in the presence of,  Jean Charles DO. I have scribed the following portions of the note - Other sections scribed: HPI, ROS, and PE.       History     Chief Complaint   Patient presents with    Cerebrovascular Accident     Hx of CVA. L sided paralysis, L droop, blurred vision, and headache that began at 0900 while cutting grass. CBG 96. Pt on Plavix. GCS 15.     Edison Jacobo is a 65 y.o. male patient with a PMHx of CVA who presents to the ED via [facility] for evaluation of left sided paralysis. Per EMS, Pt was cutting grass when around 0900 pt began to have a headache followed by left upper and lower side paralysis, left side facial droop, slurred speech, and left eye blurry vision. Pt has a past CVA around 1 year ago with left sided deficits that have improved. He is on Plavix and baby aspirin daily. Associated symptoms include nausea and shortness of breath. Patient denies any vomiting and chest pain.       The history is provided by the patient and the EMS personnel. No  was used.     Review of patient's allergies indicates:   Allergen Reactions    Lortab [hydrocodone-acetaminophen] Other (See Comments)     GI upset     History reviewed. No pertinent past medical history.  History reviewed. No pertinent surgical history.  No family history on file.  Social History[1]  Review of Systems   Constitutional:  Negative for chills, diaphoresis, fatigue and fever.   HENT:  Negative for drooling, ear pain, facial swelling, hearing loss, nosebleeds, rhinorrhea, sinus pain, sore throat, tinnitus and trouble swallowing.    Eyes:  Positive for visual disturbance (blurred vision, left side). Negative for photophobia and redness.   Respiratory:  Positive for shortness of breath. Negative for cough.    Cardiovascular:  Negative for chest pain, palpitations and leg swelling.   Gastrointestinal:  Positive for nausea. Negative for  abdominal pain, blood in stool, constipation, diarrhea and vomiting.   Endocrine: Negative for polyuria.   Genitourinary:  Negative for dysuria, flank pain and hematuria.   Musculoskeletal:  Negative for back pain, myalgias, neck pain and neck stiffness.   Skin:  Negative for rash and wound.   Allergic/Immunologic: Negative for immunocompromised state.   Neurological:  Positive for facial asymmetry (left side), speech difficulty, weakness (left side), numbness (left side) and headaches. Negative for dizziness, tremors, seizures, syncope and light-headedness.   Hematological:  Does not bruise/bleed easily.   Psychiatric/Behavioral:  Negative for confusion.        Physical Exam     Initial Vitals [05/20/25 1020]   BP Pulse Resp Temp SpO2   (!) 155/101 73 18 98.3 °F (36.8 °C) 97 %      MAP       --         Physical Exam    Constitutional: He appears well-developed and well-nourished. He is not diaphoretic. He is cooperative.  Non-toxic appearance. He appears ill.   HENT:   Head: Normocephalic and atraumatic.   Right Ear: Hearing and external ear normal.   Left Ear: Hearing and external ear normal.   Nose: Nose normal. Mouth/Throat: Uvula is midline, oropharynx is clear and moist and mucous membranes are normal.   Eyes: Conjunctivae, EOM and lids are normal. Pupils are equal, round, and reactive to light.   Pupils 3 mm and reactive bilaterally.    Neck: Trachea normal and phonation normal. No thyroid mass present. No Brudzinski's sign and no Kernig's sign noted. Carotid bruit is not present. No JVD present.   Normal range of motion.  Cardiovascular:  Normal rate, regular rhythm, normal heart sounds and normal pulses.           No murmur heard.  Pulmonary/Chest: No accessory muscle usage. No tachypnea. No respiratory distress. He has no decreased breath sounds. He has no wheezes. He has no rhonchi. He has no rales.   Abdominal: Abdomen is soft. Bowel sounds are normal. He exhibits no distension. There is no abdominal  tenderness. No hernia. There is no rebound and no guarding.   Musculoskeletal:      Cervical back: Normal range of motion. No edema, erythema or rigidity. Muscular tenderness present. No spinous process tenderness. Normal range of motion.     Neurological: He is alert and oriented to person, place, and time. A cranial nerve deficit and sensory deficit is present. He exhibits abnormal muscle tone. GCS eye subscore is 4. GCS verbal subscore is 5. GCS motor subscore is 6. He displays no Babinski's sign on the right side. He displays no Babinski's sign on the left side.   Reflex Scores:       Patellar reflexes are 2+ on the right side and 1+ on the left side.       Achilles reflexes are 2+ on the right side and 1+ on the left side.  Flaccid paralysis of the left upper and left lower extremities. Left facial droop. Slight dysarthria. Decreased sensation to the left side extremities and face. A&O x 3.  Unable to test coordination as patient can not move his left upper or lower extremity   Skin: Skin is warm, dry and intact. Capillary refill takes less than 2 seconds. No rash noted.   Psychiatric: He has a normal mood and affect. His speech is normal and behavior is normal. Judgment and thought content normal. Cognition and memory are normal.         ED Course   Critical Care    Date/Time: 5/20/2025 12:38 PM    Performed by: Jean Charles DO  Authorized by: Marisol Pfeiffer MD  Direct patient critical care time: 15 minutes  Additional history critical care time: 5 minutes  Ordering / reviewing critical care time: 5 minutes  Documentation critical care time: 5 minutes  Consulting other physicians critical care time: 10 minutes  Consult with family critical care time: 5 minutes  Total critical care time (exclusive of procedural time) : 45 minutes  Critical care time was exclusive of separately billable procedures and treating other patients.  Critical care was necessary to treat or prevent imminent or life-threatening  deterioration of the following conditions: CNS failure or compromise.  Critical care was time spent personally by me on the following activities: development of treatment plan with patient or surrogate, discussions with consultants, examination of patient, obtaining history from patient or surrogate, ordering and performing treatments and interventions, ordering and review of laboratory studies, ordering and review of radiographic studies, pulse oximetry, re-evaluation of patient's condition, review of old charts and evaluation of patient's response to treatment.        Labs Reviewed   COMPREHENSIVE METABOLIC PANEL - Abnormal       Result Value    Sodium 138      Potassium 4.3      Chloride 110 (*)     CO2 22 (*)     Glucose 94      Blood Urea Nitrogen 17.8      Creatinine 0.92      Calcium 9.0      Protein Total 6.9      Albumin 3.9      Globulin 3.0      Albumin/Globulin Ratio 1.3      Bilirubin Total 0.7      ALP 65      ALT 60 (*)     AST 34      eGFR >60      Anion Gap 6.0      BUN/Creatinine Ratio 19     LIPID PANEL - Abnormal    Cholesterol Total 119      HDL Cholesterol 32 (*)     Triglyceride 61      Cholesterol/HDL Ratio 4      Very Low Density Lipoprotein 12      LDL Cholesterol 75.00     CBC WITH DIFFERENTIAL - Abnormal    WBC 7.46      RBC 4.83      Hgb 14.2      Hct 43.7      MCV 90.5      MCH 29.4      MCHC 32.5 (*)     RDW 12.9      Platelet 181      MPV 10.7 (*)     Neut % 54.6      Lymph % 26.5      Mono % 9.2      Eos % 8.2      Basophil % 1.2      Imm Grans % 0.3      Neut # 4.07      Lymph # 1.98      Mono # 0.69      Eos # 0.61      Baso # 0.09      Imm Gran # 0.02      NRBC% 0.0     ISTAT PROCEDURE - Abnormal    POC PTINR 1.5 (*)     Sample VENOUS     PROTIME-INR - Normal    PT 14.5      INR 1.1      Narrative:     Protimes are used to monitor anticoagulant agents such as warfarin. PT INR values are based on the current patient normal mean and the EMMANUEL value for the specific instrument reagent  used.  **Routine theraputic target values for the INR are 2.0-3.0**   TSH - Normal    TSH 0.654     MAGNESIUM - Normal    Magnesium Level 2.00     TROPONIN I - Normal    Troponin-I <0.010     B-TYPE NATRIURETIC PEPTIDE - Normal    Natriuretic Peptide 12.5     CBC W/ AUTO DIFFERENTIAL    Narrative:     The following orders were created for panel order CBC W/ AUTO DIFFERENTIAL.  Procedure                               Abnormality         Status                     ---------                               -----------         ------                     CBC with Differential[[id number]]       Abnormal            Final result                 Please view results for these tests on the individual orders.   URINALYSIS, REFLEX TO URINE CULTURE   POCT GLUCOSE, HAND-HELD DEVICE    POC Glucose 96       EKG Readings: (Independently Interpreted)   Normal sinus rhythm, rate of 63 beats per minute.  Performed at 12:00 p.m..  No ischemic changes or arrhythmia         Imaging Results              MRI Brain Limited Without Contrast (Final result)  Result time 05/20/25 11:17:20      Final result by Tatum Jacques MD (05/20/25 11:17:20)                   Impression:      1. No acute infarct.  2. Mild chronic microvascular ischemic changes.      Electronically signed by: Tatum Jacques  Date:    05/20/2025  Time:    11:17               Narrative:    EXAMINATION:  MRI BRAIN LIMITED WITHOUT CONTRAST    CLINICAL HISTORY:  Stroke alert. Left sided weakness, acute;    TECHNIQUE:  Limited MR images of the brain were obtained without the administration of intravenous contrast.    COMPARISON:  CT head dated 05/20/2025    FINDINGS:  There is no restricted diffusion, hemorrhage or edema.  Mild scattered T2/FLAIR hyperintensities in the subcortical and periventricular white matter likely represent chronic microvascular ischemic changes.  There is no mass effect or midline shift.  The basal cisterns are patent.                                        CTA Head and Neck for Code Stroke (XPD) (Final result)  Result time 05/20/25 10:59:14      Final result by Jorge Huynh MD (05/20/25 10:59:14)                   Impression:      No evidence of large vessel occlusion.    Severe stenosis of the left vertebral artery origin without evidence of downstream stenosis.    An epic message was acknowledged by Dr. Charles at 10:57 on 05/20/2025      Electronically signed by: Jorge Huynh  Date:    05/20/2025  Time:    10:59               Narrative:    EXAMINATION:  CTA HEAD AND NECK FOR STROKE (XPD)    CLINICAL HISTORY:  Neuro deficit, acute, stroke suspected.  Left-sided neurological deficit.    TECHNIQUE:  Non contrast low dose axial images were obtained thought the head. CT angiogram was performed from the level of the rich to the top of the head following the IV administration of 94mL of Omnipaque 350.   Sagittal and coronal, 3D and maximum intensity projection reconstructions were performed. Two additional phases of immediate post-contrast CT images were performed through the head alone. Arterial stenosis percentages are based on NASCET measurement criteria.  Dose reduction techniques including automatic exposure control (AEC) were utilized.    Dose (DLP): 1510 mGycm    COMPARISON:  Concurrent CT head without contrast and CT cerebral perfusion    FINDINGS:  CTA HEAD:    INTRACRANIAL: Brain parenchyma demonstrates normal attenuation and configuration.  No acute intracranial hemorrhage.  No hydrocephalus.  No intracranial mass effect.  No abnormal intracranial enhancement.    SINUSES: Visualized paranasal sinuses and mastoids are clear.    SKULL/SCALP: Visualized osseous structures are normal.    ORBITS: Visualized orbits are normal.    VASCULATURE: No proximal branch occlusion or severe focal stenosis in the major intracranial cerebral arteries.  Mild calcific atherosclerosis of the cavernous segments of both internal carotid arteries.  No intracranial aneurysm.   No vascular malformation.  Deep cerebral veins and dural venous sinuses enhance normally.    CTA NECK:    AORTIC ARCH: Conventional aortic arch anatomy.    LEFT CAROTID    COMMON CAROTID: No occlusion, hemodynamically significant stenosis, dissection or aneurysm.    INTERNAL CAROTID: Tiny amount of calcific plaque along the left carotid bulb.  No occlusion, hemodynamically significant stenosis, dissection or aneurysm.    RIGHT CAROTID    COMMON CAROTID: No occlusion, hemodynamically significant stenosis, dissection or aneurysm.    INTERNAL CAROTID: Tiny amount of calcific plaque along the right carotid bulb.  No occlusion, hemodynamically significant stenosis, dissection or aneurysm.    VERTEBRAL ARTERIES    LEFT VERTEBRAL: Severe stenosis of the origin secondary to the soft atherosclerotic plaque.  Otherwise, normal appearance of the left vertebral artery distal to the origin.    RIGHT VERTEBRAL: No occlusion, hemodynamically significant stenosis, dissection or aneurysm.    OTHER: Bilateral subpleural reticulation in the lungs likely representing chronic interstitial lung changes.  Paraseptal emphysema bilaterally as well.  No acute fracture or aggressive osseous lesion.  No significant pathology in the visualized cervical soft tissues.  Moderate coronary artery calcific atherosclerosis.                                       CT Brain Perfusion inc Post Processing (Final result)  Result time 05/20/25 10:55:39      Final result by Tatum Jacques MD (05/20/25 10:55:39)                   Impression:      No definite territorial perfusion mismatch.      Electronically signed by: Tatum Jacques  Date:    05/20/2025  Time:    10:55               Narrative:    EXAMINATION:  CT BRAIN PERFUSION INC POST PROCESSING    CLINICAL HISTORY:  stroke alert, left sided weakness;    TECHNIQUE:  Axial postcontrast imaging of the brain was obtained for CT perfusion with post processing.    COMPARISON:  CT head from the same  "day    FINDINGS:  There is no definite territorial perfusion mismatch identified.                                       CT HEAD FOR CODE STROKE (Final result)  Result time 05/20/25 10:45:24      Final result by Royce Willingham MD (05/20/25 10:45:24)                   Narrative:    EXAMINATION  CT HEAD FOR CODE STROKE    CLINICAL HISTORY  Neuro deficit, acute, stroke suspected;    TECHNIQUE  Non-contrast axial CT images were acquired and multiplanar reconstructions accomplished by a CT technologist at a separate workstation, pushed to PACS for physician review.    COMPARISON  11 June 2024    FINDINGS  Images were reviewed in subdural, brain, soft tissue, and bone windows.    Exam quality: Motion/streak artifact limits assessment of the posterior fossa.    Hemorrhage: No evidence of acute hyperattenuating blood products.    Parenchyma: There is diffuse bilateral supratentorial white matter hypoattenuation, typical of chronic microvascular changes. No discrete mass, mass effect, or CT evidence of acute large vascular territory insult. Gray-white differentiation is preserved. No midline shift is evident.    CSF spaces: Proportional appearance of ventricular and sulcal enlargement. No hydrocephalus. No masses or fluid collections.    Other findings: No focally hyperdense artery. Scattered carotid siphon calcifications are present. No abnormal densities within the dural sinuses. No abnormalities of the scalp or subjacent osseous structures. Mastoids are well aerated. No focal abnormality of the sella. The included facial structures are unremarkable.    IMPRESSION  1. No acute intracranial hemorrhage.  2. No findings to suggest large vascular territory acute ischemic insult.  ==========    In accordance with the "Code FAST" protocol, above findings were reported to SONIA Pérez NP (Stroke Team) prior to completion of the final report (5/20/2025; 10:36).    RECOMMENDATION(S)  Additional assessment utilizing brain MRI would " allow more sensitive evaluation if symptoms persist or there is other cause for elevated clinical concern.    RADIATION DOSE  Automated tube current modulation, weight-based exposure dosing, and/or iterative reconstruction technique utilized to reach lowest reasonably achievable exposure rate.    DLP: 1205 mGy*cm      Electronically signed by: Royce Willingham  Date:    05/20/2025  Time:    10:45                                     Medications   sodium chloride 0.9% flush 10 mL (has no administration in time range)   labetaloL injection 10 mg (has no administration in time range)   bisacodyL suppository 10 mg (has no administration in time range)   atorvastatin tablet 40 mg (has no administration in time range)   aspirin EC tablet 81 mg (has no administration in time range)   iohexoL (OMNIPAQUE 350) injection 94 mL (94 mLs Intravenous Given 5/20/25 1041)   acetaminophen 1,000 mg/100 mL (10 mg/mL) injection 1,000 mg (0 mg Intravenous Stopped 5/20/25 1153)     Medical Decision Making  65-year-old male with left-sided weakness    Differential Diagnosis:   Judging by the patient's chief complaint and pertinent history, the patient has the following possible differential diagnoses, including but not limited to the following.  Some of these are deemed to be lower likelihood and some more likely based on my physical exam and history combined with possible lab work and/or imaging studies.   Please see the pertinent studies, and refer to the HPI.  Some of these diagnoses will take further evaluation to fully rule out, perhaps as an outpatient and the patient was encouraged to follow up when discharged for more comprehensive evaluation    CVA, TIA, migraine, intracranial hemorrhage, intracranial mass, electrolyte derangement    Problems Addressed:  Acute focal neurological deficit: acute illness or injury with systemic symptoms that poses a threat to life or bodily functions  Acute nonintractable headache, unspecified headache  type: undiagnosed new problem with uncertain prognosis    Amount and/or Complexity of Data Reviewed  Independent Historian: david  External Data Reviewed: labs, radiology, ECG and notes.  Labs: ordered. Decision-making details documented in ED Course.  Radiology: ordered and independent interpretation performed. Decision-making details documented in ED Course.  ECG/medicine tests: ordered and independent interpretation performed. Decision-making details documented in ED Course.     Details: Normal sinus rhythm, rate of 63 beats per minute.  Performed at 12:00 p.m..  No ischemic changes or arrhythmia    Risk  Prescription drug management.  Decision regarding hospitalization.      Additional MDM:     NIH Stroke Scale:   Interval = initial 15 minute assessment from arrival  Level of consciousness = 0 - alert  LOC questions = 0 - answers both correctly  LOC commands = 0 - performs both correctly  Best gaze = 0 - normal  Visual = 1 - partial hemianopia  Facial palsy = 1 - minor  Motor left arm =  3 - no effort against gravity  Motor right arm =  0 - no drift  Motor left leg = 3 - no effort against gravity  Motor right leg =  0 - no drift  Limb ataxia = 2 - present in two limbs  Sensory = 1 - mild to moderate loss  Best language = 0 - no aphasia  Dysarthria = 1 - mild to moderate dysarthria  Extinction and inattention = 0 - no neglect  NIH Stroke Scale Total = 12           Scribe Attestation:   Scribe #1: I performed the above scribed service and the documentation accurately describes the services I performed. I attest to the accuracy of the note.    Attending Attestation:           Physician Attestation for Scribe:  Physician Attestation Statement for Scribe #1: I, Jean Charles, DO, reviewed documentation, as scribed by Wes Solomon in my presence, and it is both accurate and complete.             ED Course as of 05/20/25 1241   Tue May 20, 2025   1033 On chart review, it was noted on 06/10/2024 patient had a very  similar presentation with acute left-sided weakness while cutting grass.  MRI did not show any signs of CVA at that time. [MM]   1057 CT of the head is without hemorrhage, mass, mass effect    CTA of the head and neck:  No evidence of large vessel occlusion.     Severe stenosis of the left vertebral artery origin without evidence of downstream stenosis.  [MM]   1058 Case discussed with neurology at bedside.  Patient has a history of the same presentation with a negative MRI in June of 2024.  We will hold off on TNK at this time due to this and obtain an MRI to evaluate for acute stroke. [MM]   1119 After discussion with Neurology, we will hold off on TNK.  MRI is negative for acute stroke, patient will be admitted to hospitalist with Neurology consultation.  Symptoms possibly complex migraine versus psych versus other [MM]   1119 CT of the perfusion is without obvious perfusion deficits [MM]   1124 Paged [facility] medicine.  [GG]   1132 TSH: 0.654 [MM]   1132 Magnesium : 2.00 [MM]   1132 Troponin I: <0.010 [MM]   1132 Sodium: 138 [MM]   1132 Potassium: 4.3 [MM]   1132 Chloride(!): 110 [MM]   1132 CO2(!): 22 [MM]   1132 Glucose: 94 [MM]   1132 BUN: 17.8 [MM]   1132 Creatinine: 0.92 [MM]   1132 Calcium: 9.0 [MM]   1132 INR: 1.1 [MM]   1132 WBC: 7.46 [MM]   1132 Hemoglobin: 14.2 [MM]   1132 Platelet Count: 181 [MM]   1219 Case discussed with hospitalist, who does accept patient for Dr. Pfeiffer [MM]      ED Course User Index  [GG] Wes Solomon  [MM] Jean Charles DO                           Clinical Impression:  Final diagnoses:  [R29.818] Acute focal neurological deficit (Primary)  [R51.9] Acute nonintractable headache, unspecified headache type          ED Disposition Condition    Admit                       [1]   Social History  Tobacco Use    Smoking status: Every Day     Types: Pipe     Start date: 2008    Smokeless tobacco: Never        Jean Charles DO  05/20/25 1249

## 2025-05-20 NOTE — H&P
[facility] Medicine History & Physical Examination       Patient Name: Edison Jacobo  MRN: [id number]  Patient Class: IP- Inpatient   Admission Date: 5/20/2025   Admitting Physician: MELI Service   Length of Stay: 0  Attending Physician: Dr Magdy Bronson  Primary Care Provider: Royce Canchola MD  Face-to-Face encounter date: 05/20/2025  Code Status:  Full code  Chief Complaint: Cerebrovascular Accident (Hx of CVA. L sided paralysis, L droop, blurred vision, and headache that began at 0900 while cutting grass. CBG 96. Pt on Plavix. GCS 15.)        Screening for Social Drivers for health:  Patient screened for food insecurity, housing instability, transportation needs, utility difficulties, and interpersonal safety (select all that apply as identified as concern)  []Housing or Food  []Transportation Needs  []Utility Difficulties  []Interpersonal safety  [x]None    Patient information was obtained from patient, patient's family, past medical records and/or ER records.     HISTORY OF PRESENT ILLNESS:   Edison Jacobo is a 65 y.o. 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 on 5/20/2025 as a code fast with a primary complaint of left arm weakness with associated paralysis to upper and lower extremity with left facial droop headache slurred speech and loss of vision to his left eye with associated numbness and tingling.  Patient reports his symptoms started abruptly while he was mowing his grass at about 9:00 a.m. this morning.  Patient was last known normal at 9:00 a.m. when the symptoms started.  He does have a history of CVA in the past June 2024, which he received tPA at that time.  Patient reports he had the same presentation during that time which he subsequently went to inpatient rehab and regain functioning.  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  Patient reports he is on aspirin and Plavix daily.  He reports his last dose of medications were yesterday 05/19/2025.  No reports of syncope, loss of consciousness, chest pain, shortness a breath, nausea, vomiting, diarrhea, fever or any sick contacts.  Lab work reviewed demonstrated ALT 60; other indices unremarkable.  CT of head without contrast impression reviewed demonstrated no acute intracranial hemorrhage, no findings to suggest large vascular territory acute ischemic insult.  CT brain perfusion impression reviewed demonstrated no definitive territorial perfusion mismatch.  CTA of the head and neck impression reviewed demonstrated no evidence of large vessel occlusion, severe stenosis of the left vertebral artery origin without evidence of downstream stenosis.  MRI of the brain without contrast impression reviewed demonstrated no acute infarct, mild chronic microvascular ischemic changes.  Patient was evaluated by Neurology Services in the ED.  Initial vital signs /101 pulse 73 respirations 18 temperature 98.3° F O2 saturation 97% on room air.  Neurology Services for stroke team have been consulted.  Patient is admitted to hospital medicine services for further management.    PAST MEDICAL HISTORY:   CVA   CAD   HTN  HLD   BPH   Insomnia   Chronic arthritis    PAST SURGICAL HISTORY:   Non-significant    ALLERGIES:   Lortab [hydrocodone-acetaminophen]    FAMILY HISTORY:   Reviewed and negative    SOCIAL HISTORY:     Social History     Tobacco Use    Smoking status: Every Day     Types: Pipe     Start date: 2008    Smokeless tobacco: Never   Substance Use Topics    Alcohol use: Not on file        HOME MEDICATIONS:   As documented  Prior to Admission medications    Medication Sig Start Date End Date Taking? Authorizing Provider   amitriptyline (ELAVIL) 25 MG tablet Take 1 tablet (25 mg total) by mouth every evening. 2/10/25 2/10/26  Mora Evangelista FNP   aspirin  (ECOTRIN) 81 MG EC tablet Take 81 mg by mouth.    Provider, Historical   atorvastatin (LIPITOR) 40 MG tablet TAKE 1 TABLET(40 MG) BY MOUTH DAILY 5/5/25   Mora Evangelista FNP   cholecalciferol, vitamin D3, (VITAMIN D3) 50 mcg (2,000 unit) Cap capsule Take 25 mcg by mouth.    Provider, Historical   cyanocobalamin (VITAMIN B-12) 1000 MCG tablet Take 1 tablet by mouth every morning.    Provider, Historical   desonide (DESOWEN) 0.05 % cream APPLY 1 APPLICATION SPARINGLY TOPICALLY TO THE AFFECTED AREA OF THE FACE AND FOREHEAD ONCE A DAY 3 TIMES A WEEK 2/19/24   Provider, Historical   gabapentin (NEURONTIN) 600 MG tablet Take 1 tablet (600 mg total) by mouth 3 (three) times daily. 10/28/24 10/28/25  Mora Evangelista FNP   omega-3 fatty acids/fish oil (FISH OIL-OMEGA-3 FATTY ACIDS) 300-1,000 mg capsule Take 2 g by mouth.    Provider, Historical   ubrogepant (UBRELVY) 100 mg tablet Take 1 tablet (100 mg total) by mouth as needed for Migraine. If symptoms persist or return, may repeat dose after 2 hours. Maximum: 200 mg per 24 hours 2/14/25   Mora Evangelista FNP       REVIEW OF SYSTEMS:   Except as documented, all other systems reviewed and negative     PHYSICAL EXAM:     VITAL SIGNS: 24 HRS MIN & MAX LAST   Temp  Min: 98.3 °F (36.8 °C)  Max: 98.3 °F (36.8 °C) 98.3 °F (36.8 °C)   BP  Min: 139/85  Max: 155/101 139/85   Pulse  Min: 66  Max: 73  68   Resp  Min: 18  Max: 20 20   SpO2  Min: 97 %  Max: 97 % 97 %       General appearance: Well-developed, well-nourished male chronically ill-appearing looks older than stated age; fatigued, nontoxic in no apparent distress.  No family at the bedside  HENT: Atraumatic head. Moist mucous membranes of oral cavity.  Eyes: PERRL  Lungs: Clear to auscultation bilaterally. No wheezing present.   Heart: Regular rate and rhythm. S1 and S2 present cap refill brisk  Abdomen: Soft, non-distended, non-tender.  Bowel sounds are normal.   Extremities: No cyanosis, clubbing, left-sided  paralysis with no purposeful movement to upper and lower extremity; generalized weakness  Skin:  Warm and dry  Neuro: Neuro  CN 2, 3, 4, 6: EOMI, nl visual fields, nl gross vision test  CN 5: nl chewing, nl facial sensation  CN 7: nl smile, nl brow wrinkle, nl eyes closure, left side facial droop  CN 8: nl hearing  CN 9, 10: nl uvular elevation on phonation  CN 11: nl shoulder shrug  CN 12: nl tongue protrusion, midline, nl lateral deviation   Strength: right side nl strength proximally and distally, upper and lower extremity, left side paralysis , flaccid no movement with or against gravity, proximally and distally, upper and lower extremity  Sensation:  Decreased sensation to left face and left upper and lower extremity reports numbness and tingling complaints of numbness and tingling  Speech:  Speech clear  Thinking: clear, goal oriented, no delusions  Psych/mental status: flat affect cooperative    LABS AND IMAGING:     Recent Labs   Lab 05/20/25  1026   WBC 7.46   RBC 4.83   HGB 14.2   HCT 43.7   MCV 90.5   MCH 29.4   MCHC 32.5*   RDW 12.9      MPV 10.7*       Recent Labs   Lab 05/20/25  1026      K 4.3   *   CO2 22*   BUN 17.8   CREATININE 0.92   GLU 94   CALCIUM 9.0   MG 2.00   ALBUMIN 3.9   PROT 6.9   ALKPHOS 65   ALT 60*   AST 34   BILITOT 0.7       Microbiology Results (last 7 days)       ** No results found for the last 168 hours. **             MRI Brain Limited Without Contrast  Narrative: EXAMINATION:  MRI BRAIN LIMITED WITHOUT CONTRAST    CLINICAL HISTORY:  Stroke alert. Left sided weakness, acute;    TECHNIQUE:  Limited MR images of the brain were obtained without the administration of intravenous contrast.    COMPARISON:  CT head dated 05/20/2025    FINDINGS:  There is no restricted diffusion, hemorrhage or edema.  Mild scattered T2/FLAIR hyperintensities in the subcortical and periventricular white matter likely represent chronic microvascular ischemic changes.  There is no mass  effect or midline shift.  The basal cisterns are patent.  Impression: 1. No acute infarct.  2. Mild chronic microvascular ischemic changes.    Electronically signed by: Tatum Jacques  Date:    05/20/2025  Time:    11:17  CTA Head and Neck for Code Stroke (XPD)  Narrative: EXAMINATION:  CTA HEAD AND NECK FOR STROKE (XPD)    CLINICAL HISTORY:  Neuro deficit, acute, stroke suspected.  Left-sided neurological deficit.    TECHNIQUE:  Non contrast low dose axial images were obtained thought the head. CT angiogram was performed from the level of the rich to the top of the head following the IV administration of 94mL of Omnipaque 350.   Sagittal and coronal, 3D and maximum intensity projection reconstructions were performed. Two additional phases of immediate post-contrast CT images were performed through the head alone. Arterial stenosis percentages are based on NASCET measurement criteria.  Dose reduction techniques including automatic exposure control (AEC) were utilized.    Dose (DLP): 1510 mGycm    COMPARISON:  Concurrent CT head without contrast and CT cerebral perfusion    FINDINGS:  CTA HEAD:    INTRACRANIAL: Brain parenchyma demonstrates normal attenuation and configuration.  No acute intracranial hemorrhage.  No hydrocephalus.  No intracranial mass effect.  No abnormal intracranial enhancement.    SINUSES: Visualized paranasal sinuses and mastoids are clear.    SKULL/SCALP: Visualized osseous structures are normal.    ORBITS: Visualized orbits are normal.    VASCULATURE: No proximal branch occlusion or severe focal stenosis in the major intracranial cerebral arteries.  Mild calcific atherosclerosis of the cavernous segments of both internal carotid arteries.  No intracranial aneurysm.  No vascular malformation.  Deep cerebral veins and dural venous sinuses enhance normally.    CTA NECK:    AORTIC ARCH: Conventional aortic arch anatomy.    LEFT CAROTID    COMMON CAROTID: No occlusion, hemodynamically  significant stenosis, dissection or aneurysm.    INTERNAL CAROTID: Tiny amount of calcific plaque along the left carotid bulb.  No occlusion, hemodynamically significant stenosis, dissection or aneurysm.    RIGHT CAROTID    COMMON CAROTID: No occlusion, hemodynamically significant stenosis, dissection or aneurysm.    INTERNAL CAROTID: Tiny amount of calcific plaque along the right carotid bulb.  No occlusion, hemodynamically significant stenosis, dissection or aneurysm.    VERTEBRAL ARTERIES    LEFT VERTEBRAL: Severe stenosis of the origin secondary to the soft atherosclerotic plaque.  Otherwise, normal appearance of the left vertebral artery distal to the origin.    RIGHT VERTEBRAL: No occlusion, hemodynamically significant stenosis, dissection or aneurysm.    OTHER: Bilateral subpleural reticulation in the lungs likely representing chronic interstitial lung changes.  Paraseptal emphysema bilaterally as well.  No acute fracture or aggressive osseous lesion.  No significant pathology in the visualized cervical soft tissues.  Moderate coronary artery calcific atherosclerosis.  Impression: No evidence of large vessel occlusion.    Severe stenosis of the left vertebral artery origin without evidence of downstream stenosis.    An epic message was acknowledged by Dr. Charles at 10:57 on 05/20/2025    Electronically signed by: Jorge Huynh  Date:    05/20/2025  Time:    10:59  CT Brain Perfusion inc Post Processing  Narrative: EXAMINATION:  CT BRAIN PERFUSION INC POST PROCESSING    CLINICAL HISTORY:  stroke alert, left sided weakness;    TECHNIQUE:  Axial postcontrast imaging of the brain was obtained for CT perfusion with post processing.    COMPARISON:  CT head from the same day    FINDINGS:  There is no definite territorial perfusion mismatch identified.  Impression: No definite territorial perfusion mismatch.    Electronically signed by: Tatum Jacques  Date:    05/20/2025  Time:    10:55  CT HEAD FOR CODE  "STROKE  EXAMINATION  CT HEAD FOR CODE STROKE    CLINICAL HISTORY  Neuro deficit, acute, stroke suspected;    TECHNIQUE  Non-contrast axial CT images were acquired and multiplanar reconstructions accomplished by a CT technologist at a separate workstation, pushed to PACS for physician review.    COMPARISON  11 June 2024    FINDINGS  Images were reviewed in subdural, brain, soft tissue, and bone windows.    Exam quality: Motion/streak artifact limits assessment of the posterior fossa.    Hemorrhage: No evidence of acute hyperattenuating blood products.    Parenchyma: There is diffuse bilateral supratentorial white matter hypoattenuation, typical of chronic microvascular changes. No discrete mass, mass effect, or CT evidence of acute large vascular territory insult. Gray-white differentiation is preserved. No midline shift is evident.    CSF spaces: Proportional appearance of ventricular and sulcal enlargement. No hydrocephalus. No masses or fluid collections.    Other findings: No focally hyperdense artery. Scattered carotid siphon calcifications are present. No abnormal densities within the dural sinuses. No abnormalities of the scalp or subjacent osseous structures. Mastoids are well aerated. No focal abnormality of the sella. The included facial structures are unremarkable.    IMPRESSION  1. No acute intracranial hemorrhage.  2. No findings to suggest large vascular territory acute ischemic insult.  ==========    In accordance with the "Code FAST" protocol, above findings were reported to SONIA Pérez NP (Stroke Team) prior to completion of the final report (5/20/2025; 10:36).    RECOMMENDATION(S)  Additional assessment utilizing brain MRI would allow more sensitive evaluation if symptoms persist or there is other cause for elevated clinical concern.    RADIATION DOSE  Automated tube current modulation, weight-based exposure dosing, and/or iterative reconstruction technique utilized to reach lowest reasonably " achievable exposure rate.    DLP: 1205 mGy*cm    Electronically signed by: Royce Willingham  Date:    05/20/2025  Time:    10:45        ASSESSMENT & PLAN:   ASSESSMENT:  Acute CVA rule out versus TIA-POA   Left-sided weakness with paralysis upper and lower extremity-suspect secondary to acute CVA-POA   Left facial droop-suspect secondary to acute CVA-POA   Dysarthria- suspected secondary to acute CVA- POA  HTN- urgency- POA  Weakness- POA    Hx of CAD, HLD, chronic arthritis, BPH, insomnia      PLAN:  Consult Neurology Services stroke team appreciate assistance and recommendations   CVA workup   Continue with imaging diagnostics per CVA workup   Fall precautions   Consult therapy services   Resume home medication as deemed necessary once patient is able to take p.o.   Repeat lab work in a.m.  MRI of the brain with and without contrast   CT cervical spine with and without contrast  Further direction and management once diagnostics resulted    VTE Prophylaxis:  SCD for DVT prophylaxis and will be advised to be as mobile as possible and sit in a chair as tolerated    Patient condition:  Stable  __________________________________________________________________________  INPATIENT LIST OF MEDICATIONS     Scheduled Meds:   aspirin  81 mg Oral Daily    atorvastatin  40 mg Oral Daily     Continuous Infusions:  PRN Meds:.  Current Facility-Administered Medications:     bisacodyL, 10 mg, Rectal, Daily PRN    labetalol, 10 mg, Intravenous, Q2H PRN    sodium chloride 0.9%, 10 mL, Intravenous, PRN      I, LILLIAN Phelps have reviewed and discussed the case with Dr. Marisol Pfeiffer.  Please see the following addendum for further assessment and plan from their attending MD. This note was created with a assistance of electronic voice recognition software.  There may be transcription errors as a result of using this technology however minimal; and effort has been made to assure accuracy of the transcription but any obvious areas  or admissions should be clarified with the author of the document   Carla AYON Nelly, [facility]   05/20/2025    ________________________________________________________________________________    MD Addendum:  Dr. ROYA ---assumed care of this patient today at ---am/pm  For the patient encounter, I performed the substantive portion of the visit, I reviewed the NP/PA documentation, treatment plan, and medical decision making.  I had face to face time with this patient     A. History:    B. Physical exam:    C. Medical decision making:    Discharge Planning and Disposition: No mobility needs. Ambulating well. Good social support system.   Anticipated discharge    All diagnosis and differential diagnosis have been reviewed; assessment and plan has been documented; I have personally reviewed the labs and test results that are presently available; I have reviewed the patients medication list; I have reviewed the consulting providers response and recommendations. I have reviewed or attempted to review medical records based upon their availability.    All of the patient and family questions have been addressed and answered. Patient's is agreeable to the above stated plan. I will continue to monitor closely and make adjustments to medical management as needed.

## 2025-05-20 NOTE — PLAN OF CARE
Problem: Adult Inpatient Plan of Care  Goal: Plan of Care Review  Outcome: Progressing  Flowsheets (Taken 5/20/2025 9299)  Plan of Care Reviewed With: patient  Goal: Patient-Specific Goal (Individualized)  Outcome: Progressing  Goal: Absence of Hospital-Acquired Illness or Injury  Outcome: Progressing  Goal: Optimal Comfort and Wellbeing  Outcome: Progressing  Goal: Readiness for Transition of Care  Outcome: Progressing

## 2025-05-20 NOTE — PT/OT/SLP EVAL
"[facility]  Speech Language Pathology Department  Clinical Swallow Evaluation    Patient Name:  Edison Jacobo   MRN:  [id number]    Recommendations     General recommendations:  Modified Barium Swallow Study pending function  Solid texture recommendation:  NPO  Liquid consistency recommendation: NPO   Medications: NPO  Precautions: aspiration    History     Edison Jacobo is a/n 65 y.o. male admitted as a CVA work up with acute L sided paralysis of lower and upper extremity, facial droop, slurred speech.    Passed Alfonso with ED nurse, however patient requiring multiple swallows and reporting difficulty with each trial. Requesting SLP evaluation.     Home diet texture/consistency: Regular and thin liquids  Current method of nutrition: NPO    Patient complaint: "I feel weak"    Subjective     Patient awake and alert. L sided paralysis and facial droop remains.    Patient goals: to eat/drink   Spiritual/Cultural/Gnosticism Beliefs/Practices that affect care: no    Pain/Comfort: Pain Rating 1: 0/10    Respiratory Status:  room air    Restraints/positioning devices: none    Objective     ORAL MUSCULATURE  Dentition: own teeth  Secretion Management: adequate  Mucosal Quality: good  Facial Movement: reduced left  Vocal Quality: adequate    PO TRIALS  Consistency Fed By Oral Symptoms Pharyngeal Symptoms   Ice chips Self with assistance None None   Thin liquid by cup Self None Multiple swallows   Thin liquid by straw Self None Multiple swallows- intermittent   Puree Self Slowed oral transit time Multiple swallows     Patient with visible effort to provide swallow. Patient require multiple swallows for the majority of trials. Pt's SpO2 with steady decline from 94% to 88% ~1 minute after last trial, however did return to baseline. Delayed cough also noted after trials.     Assessment     Patient presenting with significant weakness and reported/observed difficulties for swallow clearance. Patient is " not currently appropriate for PO diet. MBS warranted to further assess swallow.     SLP rec: NPO, ok for occasional ice chip or small sip of water following oral care.     SLP to follow up tomorrow morning.     Outcome Measures     Functional Oral Intake Scale: 1 - Nothing by mouth    Goals     Multidisciplinary Problems       SLP Goals          Problem: SLP    Goal Priority Disciplines Outcome   SLP Goal     SLP    Description: LTG: The pt will tolerate least restrictive diet with no overt s/sx of aspiration.    STGs:  1. Pt will participate in MBS if clinically needed  2. Pt will tolerate trials without multiple swallows required                       Education     Patient provided with verbal education regarding risks of aspiration and NPO status.  Understanding was verbalized.    Plan     SLP Follow-Up:  Yes   Patient to be seen:  daily   Plan of Care expires:  05/27/25  Plan of Care reviewed with:  patient     Time Tracking     SLP Treatment Date:   05/20/25  Speech Start Time:  1450  Speech Stop Time:  1505     Speech Total Time (min):  15 min    Billable minutes:  Swallow and Oral Function Evaluation, 15 minutes     05/20/2025

## 2025-05-21 LAB
ALBUMIN SERPL-MCNC: 3.5 G/DL (ref 3.4–4.8)
ALBUMIN/GLOB SERPL: 1.2 RATIO (ref 1.1–2)
ALP SERPL-CCNC: 65 UNIT/L (ref 40–150)
ALT SERPL-CCNC: 58 UNIT/L (ref 0–55)
ANION GAP SERPL CALC-SCNC: 21 MMOL/L (ref 8–16)
ANION GAP SERPL CALC-SCNC: 7 MEQ/L
APTT PPP: 29 SECONDS (ref 23.2–33.7)
AST SERPL-CCNC: 35 UNIT/L (ref 11–45)
BACTERIA #/AREA URNS AUTO: ABNORMAL /HPF
BASOPHILS # BLD AUTO: 0.08 X10(3)/MCL
BASOPHILS NFR BLD AUTO: 1.1 %
BILIRUB SERPL-MCNC: 0.9 MG/DL
BILIRUB UR QL STRIP.AUTO: NEGATIVE
BUN SERPL-MCNC: 14.6 MG/DL (ref 8.4–25.7)
BUN SERPL-MCNC: 17 MG/DL (ref 6–30)
CALCIUM SERPL-MCNC: 9 MG/DL (ref 8.8–10)
CHLORIDE SERPL-SCNC: 100 MMOL/L (ref 95–110)
CHLORIDE SERPL-SCNC: 106 MMOL/L (ref 98–107)
CLARITY UR: CLEAR
CO2 SERPL-SCNC: 26 MMOL/L (ref 23–31)
COLOR UR AUTO: ABNORMAL
CREAT SERPL-MCNC: 0.8 MG/DL (ref 0.72–1.25)
CREAT SERPL-MCNC: 1.1 MG/DL (ref 0.5–1.4)
CREAT/UREA NIT SERPL: 18
EOSINOPHIL # BLD AUTO: 0.83 X10(3)/MCL (ref 0–0.9)
EOSINOPHIL NFR BLD AUTO: 11.7 %
ERYTHROCYTE [DISTWIDTH] IN BLOOD BY AUTOMATED COUNT: 13 % (ref 11.5–17)
GFR SERPLBLD CREATININE-BSD FMLA CKD-EPI: >60 ML/MIN/1.73/M2
GLOBULIN SER-MCNC: 3 GM/DL (ref 2.4–3.5)
GLUCOSE SERPL-MCNC: 82 MG/DL (ref 82–115)
GLUCOSE SERPL-MCNC: 93 MG/DL (ref 70–110)
GLUCOSE UR QL STRIP: NORMAL
HCT VFR BLD AUTO: 41.7 % (ref 42–52)
HCT VFR BLD CALC: 38 %PCV (ref 36–54)
HGB BLD-MCNC: 13 G/DL
HGB BLD-MCNC: 13.7 G/DL (ref 14–18)
HGB UR QL STRIP: NEGATIVE
IMM GRANULOCYTES # BLD AUTO: 0.02 X10(3)/MCL (ref 0–0.04)
IMM GRANULOCYTES NFR BLD AUTO: 0.3 %
INR PPP: 1.2
KETONES UR QL STRIP: NEGATIVE
LEUKOCYTE ESTERASE UR QL STRIP: NEGATIVE
LYMPHOCYTES # BLD AUTO: 2.72 X10(3)/MCL (ref 0.6–4.6)
LYMPHOCYTES NFR BLD AUTO: 38.2 %
MAGNESIUM SERPL-MCNC: 2 MG/DL (ref 1.6–2.6)
MCH RBC QN AUTO: 30 PG (ref 27–31)
MCHC RBC AUTO-ENTMCNC: 32.9 G/DL (ref 33–36)
MCV RBC AUTO: 91.4 FL (ref 80–94)
MONOCYTES # BLD AUTO: 0.55 X10(3)/MCL (ref 0.1–1.3)
MONOCYTES NFR BLD AUTO: 7.7 %
MUCOUS THREADS URNS QL MICRO: ABNORMAL /LPF
NEUTROPHILS # BLD AUTO: 2.92 X10(3)/MCL (ref 2.1–9.2)
NEUTROPHILS NFR BLD AUTO: 41 %
NITRITE UR QL STRIP: NEGATIVE
NRBC BLD AUTO-RTO: 0 %
PH UR STRIP: 5.5 [PH]
PHOSPHATE SERPL-MCNC: 3.4 MG/DL (ref 2.3–4.7)
PLATELET # BLD AUTO: 164 X10(3)/MCL (ref 130–400)
PMV BLD AUTO: 10.7 FL (ref 7.4–10.4)
POC IONIZED CALCIUM: 0.91 MMOL/L (ref 1.06–1.42)
POC TCO2 (MEASURED): 23 MMOL/L (ref 23–29)
POTASSIUM BLD-SCNC: 4.2 MMOL/L (ref 3.5–5.1)
POTASSIUM SERPL-SCNC: 4.1 MMOL/L (ref 3.5–5.1)
PROT SERPL-MCNC: 6.5 GM/DL (ref 5.8–7.6)
PROT UR QL STRIP: NEGATIVE
PROTHROMBIN TIME: 15.2 SECONDS (ref 12.5–14.5)
RBC # BLD AUTO: 4.56 X10(6)/MCL (ref 4.7–6.1)
RBC #/AREA URNS AUTO: ABNORMAL /HPF
SAMPLE: ABNORMAL
SODIUM BLD-SCNC: 139 MMOL/L (ref 136–145)
SODIUM SERPL-SCNC: 139 MMOL/L (ref 136–145)
SP GR UR STRIP.AUTO: 1.03 (ref 1–1.03)
SQUAMOUS #/AREA URNS LPF: ABNORMAL /HPF
TROPONIN I SERPL-MCNC: 0.01 NG/ML (ref 0–0.04)
UROBILINOGEN UR STRIP-ACNC: NORMAL
WBC # BLD AUTO: 7.12 X10(3)/MCL (ref 4.5–11.5)
WBC #/AREA URNS AUTO: ABNORMAL /HPF

## 2025-05-21 PROCEDURE — 97110 THERAPEUTIC EXERCISES: CPT

## 2025-05-21 PROCEDURE — 99900035 HC TECH TIME PER 15 MIN (STAT)

## 2025-05-21 PROCEDURE — 85730 THROMBOPLASTIN TIME PARTIAL: CPT | Performed by: NURSE PRACTITIONER

## 2025-05-21 PROCEDURE — 27000221 HC OXYGEN, UP TO 24 HOURS

## 2025-05-21 PROCEDURE — 81001 URINALYSIS AUTO W/SCOPE: CPT | Performed by: NURSE PRACTITIONER

## 2025-05-21 PROCEDURE — 97166 OT EVAL MOD COMPLEX 45 MIN: CPT

## 2025-05-21 PROCEDURE — 97162 PT EVAL MOD COMPLEX 30 MIN: CPT

## 2025-05-21 PROCEDURE — 99232 SBSQ HOSP IP/OBS MODERATE 35: CPT | Mod: ,,, | Performed by: SPECIALIST

## 2025-05-21 PROCEDURE — 21400001 HC TELEMETRY ROOM

## 2025-05-21 PROCEDURE — 36415 COLL VENOUS BLD VENIPUNCTURE: CPT | Performed by: NURSE PRACTITIONER

## 2025-05-21 PROCEDURE — 99900031 HC PATIENT EDUCATION (STAT)

## 2025-05-21 PROCEDURE — 63600175 PHARM REV CODE 636 W HCPCS: Performed by: INTERNAL MEDICINE

## 2025-05-21 PROCEDURE — 92611 MOTION FLUOROSCOPY/SWALLOW: CPT

## 2025-05-21 PROCEDURE — 85610 PROTHROMBIN TIME: CPT | Performed by: NURSE PRACTITIONER

## 2025-05-21 PROCEDURE — 63600175 PHARM REV CODE 636 W HCPCS

## 2025-05-21 PROCEDURE — 84484 ASSAY OF TROPONIN QUANT: CPT | Performed by: NURSE PRACTITIONER

## 2025-05-21 PROCEDURE — 25000003 PHARM REV CODE 250: Performed by: INTERNAL MEDICINE

## 2025-05-21 PROCEDURE — 80053 COMPREHEN METABOLIC PANEL: CPT | Performed by: NURSE PRACTITIONER

## 2025-05-21 PROCEDURE — 83735 ASSAY OF MAGNESIUM: CPT | Performed by: NURSE PRACTITIONER

## 2025-05-21 PROCEDURE — 85025 COMPLETE CBC W/AUTO DIFF WBC: CPT | Performed by: NURSE PRACTITIONER

## 2025-05-21 PROCEDURE — 84100 ASSAY OF PHOSPHORUS: CPT | Performed by: NURSE PRACTITIONER

## 2025-05-21 RX ORDER — NAPROXEN SODIUM 220 MG/1
81 TABLET, FILM COATED ORAL DAILY
Status: DISCONTINUED | OUTPATIENT
Start: 2025-05-22 | End: 2025-05-26 | Stop reason: HOSPADM

## 2025-05-21 RX ORDER — AMITRIPTYLINE HYDROCHLORIDE 25 MG/1
25 TABLET, FILM COATED ORAL NIGHTLY
Status: DISCONTINUED | OUTPATIENT
Start: 2025-05-21 | End: 2025-05-26 | Stop reason: HOSPADM

## 2025-05-21 RX ORDER — TRAZODONE HYDROCHLORIDE 50 MG/1
50 TABLET ORAL NIGHTLY
Status: DISCONTINUED | OUTPATIENT
Start: 2025-05-21 | End: 2025-05-26 | Stop reason: HOSPADM

## 2025-05-21 RX ORDER — OXYCODONE AND ACETAMINOPHEN 10; 325 MG/1; MG/1
1 TABLET ORAL EVERY 6 HOURS PRN
Refills: 0 | Status: DISCONTINUED | OUTPATIENT
Start: 2025-05-21 | End: 2025-05-26 | Stop reason: HOSPADM

## 2025-05-21 RX ORDER — DULOXETIN HYDROCHLORIDE 30 MG/1
30 CAPSULE, DELAYED RELEASE ORAL DAILY
Status: DISCONTINUED | OUTPATIENT
Start: 2025-05-22 | End: 2025-05-26 | Stop reason: HOSPADM

## 2025-05-21 RX ORDER — METOPROLOL TARTRATE 25 MG/1
25 TABLET, FILM COATED ORAL 2 TIMES DAILY
Status: DISCONTINUED | OUTPATIENT
Start: 2025-05-21 | End: 2025-05-26 | Stop reason: HOSPADM

## 2025-05-21 RX ORDER — ATORVASTATIN CALCIUM 40 MG/1
40 TABLET, FILM COATED ORAL DAILY
Status: DISCONTINUED | OUTPATIENT
Start: 2025-05-21 | End: 2025-05-26 | Stop reason: HOSPADM

## 2025-05-21 RX ORDER — GABAPENTIN 300 MG/1
600 CAPSULE ORAL 3 TIMES DAILY
Status: DISCONTINUED | OUTPATIENT
Start: 2025-05-21 | End: 2025-05-26 | Stop reason: HOSPADM

## 2025-05-21 RX ORDER — TOPIRAMATE 25 MG/1
25 TABLET, FILM COATED ORAL 2 TIMES DAILY
Status: DISCONTINUED | OUTPATIENT
Start: 2025-05-21 | End: 2025-05-26 | Stop reason: HOSPADM

## 2025-05-21 RX ADMIN — METOPROLOL TARTRATE 25 MG: 25 TABLET, FILM COATED ORAL at 08:05

## 2025-05-21 RX ADMIN — AMITRIPTYLINE HYDROCHLORIDE 25 MG: 25 TABLET, FILM COATED ORAL at 08:05

## 2025-05-21 RX ADMIN — TOPIRAMATE 25 MG: 25 TABLET, FILM COATED ORAL at 08:05

## 2025-05-21 RX ADMIN — OXYCODONE AND ACETAMINOPHEN 1 TABLET: 10; 325 TABLET ORAL at 09:05

## 2025-05-21 RX ADMIN — ENOXAPARIN SODIUM 40 MG: 40 INJECTION SUBCUTANEOUS at 04:05

## 2025-05-21 RX ADMIN — GABAPENTIN 600 MG: 300 CAPSULE ORAL at 08:05

## 2025-05-21 RX ADMIN — ASPIRIN 300 MG: 300 SUPPOSITORY RECTAL at 07:05

## 2025-05-21 RX ADMIN — MORPHINE SULFATE 2 MG: 4 INJECTION INTRAVENOUS at 01:05

## 2025-05-21 RX ADMIN — TRAZODONE HYDROCHLORIDE 50 MG: 50 TABLET ORAL at 08:05

## 2025-05-21 RX ADMIN — ATORVASTATIN CALCIUM 40 MG: 40 TABLET, FILM COATED ORAL at 07:05

## 2025-05-21 RX ADMIN — MORPHINE SULFATE 2 MG: 4 INJECTION INTRAVENOUS at 07:05

## 2025-05-21 NOTE — PT/OT/SLP EVAL
Occupational Therapy  Evaluation    Name: Edison Jacobo  MRN: [id number]    Recommendations:     Discharge therapy intensity: High Intensity Therapy   Discharge Equipment Recommendations:  to be determined by next level of care  Barriers to discharge:  Other (Comment) (ongoing medical needs)    Assessment:     Edison Jacobo is a 65 y.o. male with a medical diagnosis of  L side weakness, CVA workup.  He presents with the following performance deficits affecting function: weakness, impaired endurance, impaired sensation, impaired self care skills, impaired functional mobility, gait instability, impaired balance, decreased coordination, decreased upper extremity function, abnormal tone, decreased ROM, impaired coordination. Patient was engaged throughout OT evaluation. Patient with weakness noted in LUE; formal MMT assessment inconsistent and not congruent with observed functional use of LUE (2-/5). Patient required Max A to don BLE socks, Min A supine<>sit and Mod A x2 sit<>stand using hand-held assist, Min A sit<>supine. Patient reports dizziness upon stand, no drop in BP. Patient reports being independent at baseline with all ADLs and functional mobility. Patient would likely benefit from aida intensity therapy at d/c.    Rehab Prognosis: Good; patient would benefit from acute skilled OT services to address these deficits and reach maximum level of function.       Plan:     Patient to be seen 6 x/week to address the above listed problems via self-care/home management, therapeutic activities, therapeutic exercises, neuromuscular re-education, cognitive retraining  Plan of Care Expires: 06/21/25  Plan of Care Reviewed with: patient    Subjective     Chief Complaint: LUE numbness  Patient/Family Comments/goals: to go home    Occupational Profile:  Living Environment: [facility], 5STE, tub shower, lives with wife  Previous level of function: indep  Roles and Routines:   Equipment Used at Home: none  Assistance upon  Discharge: wife able to provide 24/7 care    Pain/Comfort:  Pain Rating 1: 0/10    Patients cultural, spiritual, Zoroastrianism conflicts given the current situation: no    Objective:     OT communicated with NSG prior to session.      Patient was found HOB elevated with peripheral IV, pulse ox (continuous), telemetry, oxygen  upon OT entry to room.    General Precautions: Standard, fall  Orthopedic Precautions: N/A  Braces: N/A    Vital Signs: Blood Pressure: 130/86  Sp02: 2L, removed during session with O2 remaining >90%    Bed Mobility:    Patient completed Scooting laterally to HOB with stand by assistance  Patient completed Supine to Sit with minimum assistance to manage LLE  Patient completed sit to supine with minimum assistance to manage LLE    Functional Mobility/Transfers:  Patient completed Sit <> Stand Transfer with moderate assistance x2 with hand-held assist. Reported dizziness upon stand; however, no drop in BP.   Lateral weight shifting in standing with Mod A for stability  Max A for stability while attempting to lift alternating LE for stepping in place EOB    Activities of Daily Living:  Lower Body Dressing: maximal assistance to don socks due to lack of voluntary movement in LLE and LUE    AMPAC 6 Click ADL:  AMPAC Total Score: 14    Functional Cognition:  Orientation: oriented to Person, Place, Time, and Situation    Visual Perceptual Skills:  Intact    Upper Extremity Function:  Right Upper Extremity:   WFL    Left Upper Extremity:  Formal MMT assessment inconsistent and not congruent with observed functional use of LUE (2-/5) throughout session  No withdraw or facial grimmace to pinch in Lue digits and forearm; reported numbness    Therapeutic Positioning  Risk for acquired pressure injuries is increased due to relative decrease in mobility d/t hospitalization  and impaired sensation.    OT interventions performed during the course of today's session:   Education was provided on benefits of and  recommendations for therapeutic positioning  Therapeutic positioning was provided at the conclusion of session to offload all bony prominences for the prevention and/or reduction of pressure injuries    Skin assessment: full body skin assessment was performed    Findings: no redness or breakdown noted    OT recommendations for therapeutic positioning throughout hospitalization:   Follow [facility] Pressure Injury Prevention Protocol    Patient Education:  Patient provided with verbal education education regarding OT role/goals/POC, fall prevention, safety awareness, Discharge/DME recommendations, and pressure ulcer prevention.  Understanding was verbalized.     Patient left HOB elevated with all lines intact, call button in reach, and NSG notified.    GOALS:   Multidisciplinary Problems       Occupational Therapy Goals          Problem: Occupational Therapy    Goal Priority Disciplines Outcome Interventions   Occupational Therapy Goal     OT, PT/OT Progressing    Description: LTG: Pt will perform basic ADLs and ADL transfers with SPV using LRAD by discharge.    STG: to be met by 6/21/25    Pt will complete grooming standing at sink with LRAD with min A.  Pt will complete UB dressing with min A.  Pt will complete LB dressing with min A using LRAD.  Pt will complete toileting with min A using LRAD.  Pt will complete functional mobility to/from toilet and toilet transfer with min A using LRAD.   Pt will demo increased strength in L UE to 3/5 MMT for increased functional use during ADL tasks.                        History:     History reviewed. No pertinent past medical history.    History reviewed. No pertinent surgical history.    Time Tracking:     OT Date of Treatment: 05/21/25  OT Start Time: 0157  OT Stop Time: 0220  OT Total Time (min): 23 min    Billable Minutes:Evaluation mod  Therapeutic Exercise 1    5/21/2025

## 2025-05-21 NOTE — PLAN OF CARE
Problem: Physical Therapy  Goal: Physical Therapy Goal  Description: Goals to be met by: 25     Patient will increase functional independence with mobility by performin. Supine to sit with Fajardo  2. Sit to stand transfer with Modified Fajardo  3. Bed to chair transfer with Modified Fajardo using Least Restrictive Assistive Device  4. Gait  x 150 feet with Modified Fajardo using Least Restrictive Assistive Device.   5. Ascend/descend 5 stair with  Handrails Fajardo using No Assistive Device.     Outcome: Progressing

## 2025-05-21 NOTE — PLAN OF CARE
Problem: Occupational Therapy  Goal: Occupational Therapy Goal  Description: LTG: Pt will perform basic ADLs and ADL transfers with SPV using LRAD by discharge.    STG: to be met by 6/21/25    Pt will complete grooming standing at sink with LRAD with min A.  Pt will complete UB dressing with min A.  Pt will complete LB dressing with min A using LRAD.  Pt will complete toileting with min A using LRAD.  Pt will complete functional mobility to/from toilet and toilet transfer with min A using LRAD.   Pt will demo increased strength in L UE to 3/5 MMT for increased functional use during ADL tasks.   Outcome: Progressing

## 2025-05-21 NOTE — PROGRESS NOTES
[facility]  Hospital Medicine Progress Note        Chief Complaint: Inpatient Follow-up    HPI:     65-year-old male with significant history of CVA involving genu of corpus callosum with residual left-sided weakness, HLD, CAD, nonspecific arthritis, BPH, insomnia, HTN.  Patient presented to the ED with complaints of acute onset left-sided weakness with associated numbness on the whole left side including the face and left-sided peripheral vision loss.  Patient reports having posterior headache prior to the onset of symptoms.  He was cutting grass when symptoms started.  Patient on dual antiplatelets at home and is well compliant.  Bradycardic in the ED. BP slightly accelerated.  Labs essentially unremarkable.  CT head was negative.  No territorial perfusion mismatch.  CT angiogram with severe left vertebral stenosis.  MRI brain negative for acute infarct.  Stroke Neurology consulted.  Hospital medicine services consulted for admission.  Patient evaluated by stroke Neurology/Interventional Neurology team, no plans for immediate intervention for vertebral artery stenosis, recommended to continue dual antiplatelets and high-intensity statin, since MRI brain without contrast was negative MRI brain and MRI C-spine with and without contrast was ordered-both non impressive, speech therapy recommended NPO pending MBS, initiated aspirin suppository and plans to reinitiate home medications once he is cleared for by mouth intake, echo non impressive, therapy services consulted    Interval Hx:   Patient seen at bedside, he is comfortably laying in bed, left-sided weakness still persist without any improvement, saturations at times high 80s to low 90s, hemodynamics stable otherwise, no other new complaints    Objective/physical exam:  General: In no acute distress, afebrile  Chest: Clear to auscultation bilaterally  Heart: S1, S2, no appreciable murmur  Abdomen: Soft, nontender, BS +  MSK: Warm, no  lower extremity edema, no clubbing or cyanosis  Neurologic: Alert and oriented x4, left-sided weakness  VITAL SIGNS: 24 HRS MIN & MAX LAST   Temp  Min: 97.4 °F (36.3 °C)  Max: 98.3 °F (36.8 °C) 97.4 °F (36.3 °C)   BP  Min: 119/75  Max: 155/101 119/75   Pulse  Min: 52  Max: 73  (!) 52   Resp  Min: 16  Max: 20 16   SpO2  Min: 94 %  Max: 97 % 97 %       Recent Labs   Lab 05/21/25  0323   WBC 7.12   RBC 4.56*   HGB 13.7*   HCT 41.7*   MCV 91.4   MCH 30.0   MCHC 32.9*   RDW 13.0      MPV 10.7*         Recent Labs   Lab 05/21/25  0323      K 4.1      CO2 26   BUN 14.6   CREATININE 0.80   GLU 82   CALCIUM 9.0   MG 2.00   ALBUMIN 3.5   PROT 6.5   ALKPHOS 65   ALT 58*   AST 35   BILITOT 0.9          Microbiology Results (last 7 days)       ** No results found for the last 168 hours. **             Scheduled Med:   aspirin  300 mg Rectal Daily    clopidogreL  75 mg Oral Daily    enoxparin  40 mg Subcutaneous Q24H (prophylaxis, 1700)    nicotine  1 patch Transdermal Daily          Assessment/Plan:    Suspected acute ischemic CVA-ruled out  Acute onset left-sided flaccid weakness with associated sensory deficits and peripheral vision loss  Left vertebral artery severe stenosis  Oropharyngeal dysphagia -cleared for modified diet  History of lacunar infarct of genu of corpus callosum  Essential HTN-stable  HLD   Chronic tobacco use   History of CAD   History of arthritis   BPH   Chronic insomnia  Prophylaxis       MRI brain, MRI C-spine with and without contrast non impressive, no explanation for his neurological symptoms based on MRI   Patient has severe vertebral stenosis on left side and this does not explain his symptoms   Discussed case with Neurology   Could be complicated migraine since the patient is also having posterior headache   Can not rule out conversion disorder   Therapy Services on board, recommending inpatient rehab, case management consulted  Continue to follow Neurology recommendations   Low  suspicion for any other neuromuscular disease per neurologist   Evaluated by ST and is cleared for modified diet  Aspirin, Plavix, high-intensity statin initiated  Echo non impressive  DC IV fluids since he is cleared for p.o.   intake   Resumed other home meds-amitriptyline, Cymbalta, gabapentin, topiramate, trazodone  Symptomatic management for headache, DC morphine, p.r.n. Percocet initiated  DVT prophylaxis-subQ Lovenox      Case management consulted for inpatient rehab placement          Marisol Pfeiffer MD   05/21/2025

## 2025-05-21 NOTE — PLAN OF CARE
05/21/25 1540   Discharge Assessment   Assessment Type Discharge Planning Assessment   Confirmed/corrected address, phone number and insurance Yes   Confirmed Demographics Correct on Facesheet   Source of Information patient   Communicated DAILY with patient/caregiver Date not available/Unable to determine   People in Home spouse   Name(s) of People in Home Trent Jacobo [phone]   Do you expect to return to your current living situation? Yes   Do you have help at home or someone to help you manage your care at home? Yes   Who are your caregiver(s) and their phone number(s)? Trent Jacobo [phone]   Prior to hospitilization cognitive status: Alert/Oriented   Current cognitive status: Alert/Oriented   Walking or Climbing Stairs Difficulty no   Dressing/Bathing Difficulty no   Equipment Currently Used at Home none   Readmission within 30 days? No   Patient currently being followed by outpatient case management? No   Do you currently have service(s) that help you manage your care at home? No   Do you take prescription medications? Yes   Do you have any problems affording any of your prescribed medications? No   Is the patient taking medications as prescribed? yes   Who is going to help you get home at discharge? Trent Jacobo [phone]   How do you get to doctors appointments? car, drives self;family or friend will provide   Are you on dialysis? No   Do you take coumadin? No   Discharge Plan A Home with family   Discharge Plan B Home Health   DME Needed Upon Discharge  other (see comments)  (TBD)     Patient admitted for vetebral artery stenosis.  PCP is Dr. Royce Canchola.  Pharmacy is [facility] in [place].  Has never used home health.   Will follow for discharge planning needs.

## 2025-05-21 NOTE — PROGRESS NOTES
[facility] - Neu[facility]  Neurology  Progress Note    Patient Name: Edison Jacobo  MRN: [id number]  Admission Date: 5/20/2025  Hospital Length of Stay: 1 days  Code Status: Full Code   Attending Provider: Marisol Pfeiffer MD  Primary Care Physician: Royce Canchola MD   Principal Problem:<principal problem not specified>    HPI:   No notes on file    Overview/Hospital Course:  No notes on file        Subjective:     Interval History: no acute events overnight. Patient reports continues unable to move left side of body. Continue posterior headache, persistent, reduced to 7/10 with morphine.       Current Medications[1]    Review of Systems   Neurological:  Positive for weakness (left sided) and headaches (posterior).      Objective:     Vital Signs (Most Recent):  Temp: 97.4 °F (36.3 °C) (05/20/25 2317)  Pulse: (!) 52 (05/20/25 2317)  Resp: 16 (05/20/25 2343)  BP: 119/75 (05/20/25 2317)  SpO2: 97 % (05/20/25 2317) Vital Signs (24h Range):  Temp:  [97.4 °F (36.3 °C)-98.3 °F (36.8 °C)] 97.4 °F (36.3 °C)  Pulse:  [52-73] 52  Resp:  [16-20] 16  SpO2:  [94 %-97 %] 97 %  BP: (119-155)/() 119/75     Weight: 99.3 kg (218 lb 14.7 oz)  Body mass index is 27.36 kg/m².     Physical Exam  Vitals and nursing note reviewed.   HENT:      Mouth/Throat:      Mouth: Mucous membranes are moist.      Pharynx: Oropharynx is clear.   Eyes:      Extraocular Movements: Extraocular movements intact.      Conjunctiva/sclera: Conjunctivae normal.      Pupils: Pupils are equal, round, and reactive to light.   Cardiovascular:      Rate and Rhythm: Normal rate.   Pulmonary:      Effort: Pulmonary effort is normal.   Abdominal:      Palpations: Abdomen is soft.   Skin:     General: Skin is warm and dry.   Neurological:      Mental Status: He is alert and oriented to person, place, and time.      Cranial Nerves: Facial asymmetry (mild left facial droop) present. No dysarthria.      Sensory: Sensory deficit (hypoanethsia left  UE/LE) present.      Motor: Weakness (no movement of LUE or LLE. no response left UE/LE to deep noxious stimuli.) present. No tremor, atrophy or abnormal muscle tone.          NEUROLOGICAL EXAMINATION:     MENTAL STATUS   Oriented to person, place, and time.     CRANIAL NERVES     CN II   Right visual field deficit: none  Left visual field deficit: none     CN III, IV, VI   Pupils are equal, round, and reactive to light.    CN XII   Tongue deviation: none    SENSORY EXAM   Right arm light touch: normal       Significant Labs: All pertinent lab results from the past 24 hours have been reviewed.    Significant Imaging: I have reviewed all pertinent imaging results/findings within the past 24 hours.    Assessment and Plan:     Left-sided weakness  - presented with Left sided paralysis, slurred speech, left facial droop  - Stroke RF: HTN, CAD, CVA  - Intervention: none, stroke mimic suspected with no acute findings on MRI brain limited  - Etiology: TBD    Stroke workup:  -CTh:  No acute intracranial hemorrhage.   - CTP: No definite territorial perfusion mismatch.   -CTA h/n: No evidence of large vessel occlusion. Severe stenosis of the left vertebral artery origin without evidence of downstream stenosis.   -MRI brain w/o limited: Impression:  1. No acute infarct.  2. Mild chronic microvascular ischemic changes.   -MRI brain w/wo contrast: Impression:  1. No evidence of intracranial metastatic disease.  2. Mild chronic microvascular ischemic changes.  -MRI cervical spine w/wo contrast: Impression:  1. Moderate degenerative narrowing of the spinal canal at C3-C7.  2. Multilevel neural foraminal stenoses as described.  3. No cord signal abnormality or abnormal enhancement.  -ECHO: (6/10/2024) EF: 55-60%. There was 6 mL of intravenous agitated saline (bubble) used. Study is negative for shunt.    -LDL: 75   -A1c: 5.6% (6/10/2024)    -TSH: 0.654   -home medications include: ASA 81 mg and Plavix 75 mg daily.    NIH Stroke  Scale      1a  Level of consciousness: 0=alert; keenly responsive   1b. LOC questions:  0=Answers both tasks correctly   1c. LOC commands: 0=Answers both tasks correctly   2.  Best Gaze: 0=normal   3.  Visual: 0=No visual loss   4. Facial Palsy: 0=Normal symmetric movement   5a.  Motor left arm: 4=No movement   5b.  Motor right arm: 0=No drift, limb holds 90 (or 45) degrees for full 10 seconds   6a. motor left le=No movement   6b  Motor right le=No drift, limb holds 90 (or 45) degrees for full 10 seconds   7. Limb Ataxia: 0=Absent   8.  Sensory: 2=Severe to total sensory loss; patient is not aware of being touched in face, arm, leg   9. Best Language:  0=No aphasia, normal   10. Dysarthria: 0=Normal   11. Extinction and Inattention: 1=Visual, tactile, auditory, spatial or personal inattention or extinction to bilateral simultaneous stimulation in one of the sensory modalities   12. Distal motor function: 0=Normal    Total:   11     Assessement:  -no acute stroke or intracranial mass   -persisting left sided paralysis UE/LE, left facial droop.  -hemiplegic migraine in differential     Plan:  -continue Aspirin 81mg daily and Plavix 75mg daily  -continue Atorvastatin 40mg daily  - regarding left vertebral artery stenosis, Dr Aguiar reviewed imaging, recommendation to continue dual antiplatelet therapy and statin.   - continue home medication ASA 81 mg daily and Plavix 75 mg daily.   - continue Lipitor 40 mg daily.  - follow up in 1 month with Mora Evangelista NP, Dr Aguiar's out patient interventional neurology clinic for consideration for out patient DSA   - okay to normalize blood pressure  -consideration for in patient rehab   - continued care with hospital medicine     - other recommendations and follow up as per MD               VTE Risk Mitigation (From admission, onward)           Ordered     enoxaparin injection 40 mg  Every 24 hours         252     IP VTE LOW RISK PATIENT  Once          05/20/25 1228     Place sequential compression device  Until discontinued         05/20/25 1228                    Daxa Mirza NP  Neurology  [facility] - Neurology       [1]   Current Facility-Administered Medications   Medication Dose Route Frequency Provider Last Rate Last Admin    aspirin suppository 300 mg  300 mg Rectal Daily Marisol Pfeiffer MD   300 mg at 05/20/25 2215    bisacodyL suppository 10 mg  10 mg Rectal Daily PRN Carla Villegas FNP        clopidogreL tablet 75 mg  75 mg Oral Daily Marisol Pfeiffer MD        enoxaparin injection 40 mg  40 mg Subcutaneous Q24H (prophylaxis, 1700) Marisol Pfeiffer MD        labetaloL injection 10 mg  10 mg Intravenous Q2H PRN Carla Villegas FNP        lactated ringers infusion   Intravenous Continuous Marisol Pfeiffer MD 75 mL/hr at 05/20/25 2216 New Bag at 05/20/25 2216    morphine injection 2 mg  2 mg Intravenous Q6H PRN Karly Polk FNP   2 mg at 05/20/25 2343    nicotine 14 mg/24 hr 1 patch  1 patch Transdermal Daily Marisol Pfeiffer MD        sodium chloride 0.9% flush 10 mL  10 mL Intravenous PRN Carla Villegas FNP

## 2025-05-21 NOTE — SUBJECTIVE & OBJECTIVE
Subjective:     Interval History: no acute events overnight. Patient reports continues unable to move left side of body. Continue posterior headache, persistent, reduced to 7/10 with morphine.       Current Medications[1]    Review of Systems   Neurological:  Positive for weakness (left sided) and headaches (posterior).      Objective:     Vital Signs (Most Recent):  Temp: 97.4 °F (36.3 °C) (05/20/25 2317)  Pulse: (!) 52 (05/20/25 2317)  Resp: 16 (05/20/25 2343)  BP: 119/75 (05/20/25 2317)  SpO2: 97 % (05/20/25 2317) Vital Signs (24h Range):  Temp:  [97.4 °F (36.3 °C)-98.3 °F (36.8 °C)] 97.4 °F (36.3 °C)  Pulse:  [52-73] 52  Resp:  [16-20] 16  SpO2:  [94 %-97 %] 97 %  BP: (119-155)/() 119/75     Weight: 99.3 kg (218 lb 14.7 oz)  Body mass index is 27.36 kg/m².     Physical Exam  Vitals and nursing note reviewed.   HENT:      Mouth/Throat:      Mouth: Mucous membranes are moist.      Pharynx: Oropharynx is clear.   Eyes:      Extraocular Movements: Extraocular movements intact.      Conjunctiva/sclera: Conjunctivae normal.      Pupils: Pupils are equal, round, and reactive to light.   Cardiovascular:      Rate and Rhythm: Normal rate.   Pulmonary:      Effort: Pulmonary effort is normal.   Abdominal:      Palpations: Abdomen is soft.   Skin:     General: Skin is warm and dry.   Neurological:      Mental Status: He is alert and oriented to person, place, and time.      Cranial Nerves: Facial asymmetry (mild left facial droop) present. No dysarthria.      Sensory: Sensory deficit (hypoanethsia left UE/LE) present.      Motor: Weakness (no movement of LUE or LLE. no response left UE/LE to deep noxious stimuli.) present. No tremor, atrophy or abnormal muscle tone.          NEUROLOGICAL EXAMINATION:     MENTAL STATUS   Oriented to person, place, and time.     CRANIAL NERVES     CN II   Right visual field deficit: none  Left visual field deficit: none     CN III, IV, VI   Pupils are equal, round, and reactive to  light.    CN XII   Tongue deviation: none    SENSORY EXAM   Right arm light touch: normal       Significant Labs: All pertinent lab results from the past 24 hours have been reviewed.    Significant Imaging: I have reviewed all pertinent imaging results/findings within the past 24 hours.         [1]   Current Facility-Administered Medications   Medication Dose Route Frequency Provider Last Rate Last Admin    aspirin suppository 300 mg  300 mg Rectal Daily Marisol Pfeiffer MD   300 mg at 05/20/25 2215    bisacodyL suppository 10 mg  10 mg Rectal Daily PRN Carla Villegas FNP        clopidogreL tablet 75 mg  75 mg Oral Daily Marisol Pfeiffer MD        enoxaparin injection 40 mg  40 mg Subcutaneous Q24H (prophylaxis, 1700) Marisol Pfeiffer MD        labetaloL injection 10 mg  10 mg Intravenous Q2H PRN Carla Villegas FNP        lactated ringers infusion   Intravenous Continuous Marisol Pfeiffer MD 75 mL/hr at 05/20/25 2216 New Bag at 05/20/25 2216    morphine injection 2 mg  2 mg Intravenous Q6H PRN Karly Polk FNP   2 mg at 05/20/25 2343    nicotine 14 mg/24 hr 1 patch  1 patch Transdermal Daily Marisol Pfeiffer MD        sodium chloride 0.9% flush 10 mL  10 mL Intravenous PRN Carla Villegas FNP

## 2025-05-21 NOTE — PROCEDURES
"[facility]  Speech Language Pathology Department  Modified Barium Swallow (MBS) Study    Patient Name:  Edison Jacobo   MRN:  [id number]    Recommendations     General recommendations:  SLP follow up regarding diet tolerance  Solid texture recommendation:  Soft & Bite Sized Diet - IDDSI Level 6  Liquid consistency recommendation: Thin liquids - IDDSI Level 0   Medications: per patient preference  Swallow strategies/precautions: small bites/sips, slow rate, and upright for PO intake  General precautions: aspiration    History     Edison Jacobo is a/n 65 y.o. male with Left sided paralysis, slurred speech, left facial droop. Patient with swallowing difficulties yesterday, secondary to weakness.     A MBS Study was completed to assess the efficiency of his swallow function, rule out aspiration and make recommendations regarding safe dietary consistencies, effective compensatory strategies, and safe eating environment.     Home diet texture/consistency: Regular and thin liquids  Current Method of Nutrition: NPO    Patient complaint: "I'm starving"    Subjective     Patient awake and alert. Patient's speech improved, reports weakness improving.     Spiritual/Cultural/Nondenominational Beliefs/Practices that affect care: no  Pain/Comfort: Pain Rating 1: 0/10    Respiratory Status:  room air    Restraints/positioning devices: none    Fluoroscopic Findings     Oral Musculature  Dentition: own teeth  Secretion Management: adequate  Mucosal Quality: good  Facial Movement: reduced left  Vocal Quality: adequate    Setup  Upright in bed  Able to self feed  Adequate head control    Visualization  Lateral view    Oral Phase:   Prolonged mastication    Pharyngeal Phase:   Adequate base of tongue retraction  Adequate airway protection    Consistency Fed by Laryngeal Penetration Aspiration Residue   Thin liquid by spoon SLP None None None   Thin liquid by cup Self None None None   Thin liquid by straw Self None " None None   Puree Self None None None   Chewable solid SLP None None None     Cervical Esophageal Phase:   UES appeared to accommodate all bolus types without stasis or retrograde movement visualized    Assessment     Oropharyngeal swallow WFL with no aspiration visualized during this study.    SLP rec: soft and bite sized solids, thin liquids, meds per pt preference.    Outcome Measures     Functional Oral Intake Scale: 5 - Total oral diet with multiple consistencies, by requiring special preparation or compensations    Goals     Multidisciplinary Problems       SLP Goals          Problem: SLP    Goal Priority Disciplines Outcome   SLP Goal     SLP    Description: LTG: The pt will tolerate least restrictive diet with no overt s/sx of aspiration.    STGs:  1. Pt will participate in MBS if clinically needed- completed  2. Pt will tolerate trials without multiple swallows required- completed  3. Pt will tolerate regular solids with appropriate mastication                       Education     Patient provided with verbal education regarding results/recommendations and SLP POC.  Understanding was verbalized.    Plan     SLP Follow-Up:  Yes    Patient to be seen:  5 x/week   Plan of Care expires:  06/04/25  Plan of Care reviewed with:  patient     Time Tracking     SLP Treatment Date:   05/21/25  Speech Start Time:  1430  Speech Stop Time:  1440     Speech Total Time (min):  10 min    Billable minutes:   Motion Fluoroscopic Evaluation, Video Recording, 10 minutes     05/21/2025

## 2025-05-21 NOTE — ASSESSMENT & PLAN NOTE
- presented with Left sided paralysis, slurred speech, left facial droop  - Stroke RF: HTN, CAD, CVA  - Intervention: none, stroke mimic suspected with no acute findings on MRI brain limited  - Etiology: TBD    Stroke workup:  -CTh:  No acute intracranial hemorrhage.   - CTP: No definite territorial perfusion mismatch.   -CTA h/n: No evidence of large vessel occlusion. Severe stenosis of the left vertebral artery origin without evidence of downstream stenosis.   -MRI brain w/o limited: Impression:  1. No acute infarct.  2. Mild chronic microvascular ischemic changes.   -MRI brain w/wo contrast: Impression:  1. No evidence of intracranial metastatic disease.  2. Mild chronic microvascular ischemic changes.  -MRI cervical spine w/wo contrast: Impression:  1. Moderate degenerative narrowing of the spinal canal at C3-C7.  2. Multilevel neural foraminal stenoses as described.  3. No cord signal abnormality or abnormal enhancement.  -ECHO: (6/10/2024) EF: 55-60%. There was 6 mL of intravenous agitated saline (bubble) used. Study is negative for shunt.    -LDL: 75   -A1c: 5.6% (6/10/2024)    -TSH: 0.654   -home medications include: ASA 81 mg and Plavix 75 mg daily.    NIH Stroke Scale      1a  Level of consciousness: 0=alert; keenly responsive   1b. LOC questions:  0=Answers both tasks correctly   1c. LOC commands: 0=Answers both tasks correctly   2.  Best Gaze: 0=normal   3.  Visual: 0=No visual loss   4. Facial Palsy: 0=Normal symmetric movement   5a.  Motor left arm: 4=No movement   5b.  Motor right arm: 0=No drift, limb holds 90 (or 45) degrees for full 10 seconds   6a. motor left le=No movement   6b  Motor right le=No drift, limb holds 90 (or 45) degrees for full 10 seconds   7. Limb Ataxia: 0=Absent   8.  Sensory: 2=Severe to total sensory loss; patient is not aware of being touched in face, arm, leg   9. Best Language:  0=No aphasia, normal   10. Dysarthria: 0=Normal   11. Extinction and Inattention:  1=Visual, tactile, auditory, spatial or personal inattention or extinction to bilateral simultaneous stimulation in one of the sensory modalities   12. Distal motor function: 0=Normal    Total:   11     Assessement:  -no acute stroke or intracranial mass   -persisting left sided paralysis UE/LE, left facial droop.  -hemiplegic migraine in differential     Plan:  -continue Aspirin 81mg daily and Plavix 75mg daily  -continue Atorvastatin 40mg daily  - regarding left vertebral artery stenosis, Dr Aguiar reviewed imaging, recommendation to continue dual antiplatelet therapy and statin.   - continue home medication ASA 81 mg daily and Plavix 75 mg daily.   - continue Lipitor 40 mg daily.  - follow up in 1 month with Mora Evangelista NP, Dr Aguiar's out patient interventional neurology clinic for consideration for out patient DSA   - okay to normalize blood pressure  -consideration for in patient rehab   - continued care with hospital medicine     - other recommendations and follow up as per MD

## 2025-05-21 NOTE — PT/OT/SLP EVAL
Physical Therapy Evaluation    Patient Name:  Edison Jacobo   MRN:  [id number]    Recommendations:     Discharge therapy intensity: High Intensity Therapy   Discharge Equipment Recommendations: to be determined by next level of care   Barriers to discharge: Impaired mobility and Ongoing medical needs    Assessment:     Edison Jacobo is a 65 y.o. male admitted with a medical diagnosis of L-sided weakness, MRI (-) acute CVA.  He presents with the following impairments/functional limitations: weakness, impaired endurance, impaired functional mobility, impaired self care skills, gait instability, impaired balance, visual deficits, decreased upper extremity function, decreased lower extremity function, impaired fine motor . Pt with inconsistent physical exam. No on command movement of LUE/LLE initially however during functional assessment noted spontaneous movement of both extremities. No knee buckling in standing, active triceps of LUE pushing into PT's hand while standing in order to support self. Reports absent sensation to light touch and painful stim in LLE. Able to stand with modA and weight shift in standing however unable to advance either foot for pre-gait. Reports visual deficits but also had recent eye surgery. For now recommend high intensity therapy at discharge as pt was previously independent with mobility and ADLs prior to this event.    Rehab Prognosis: Good; patient would benefit from acute skilled PT services to address these deficits and reach maximum level of function.    Recent Surgery: * No surgery found *      Plan:     During this hospitalization, patient would benefit from acute PT services 6 x/week to address the identified rehab impairments via gait training, therapeutic activities, therapeutic exercises, neuromuscular re-education and progress toward the following goals:    Plan of Care Expires:  06/21/25    Subjective     Chief Complaint: L-sided weakness  Patient/Family Comments/goals:  PLOF  Pain/Comfort:  Pain Rating 1: 0/10    Patients cultural, spiritual, Pentec[employer]alism conflicts given the current situation: no    Living Environment:  Pt lives with his wife in a SLH with 5 GREGG. Tub with a chair.  Prior to admission, patients level of function was independent, mild residual L-sided weakness from prior stroke.  Equipment used at home: none.  DME owned (not currently used): none.  Upon discharge, patient will have assistance from spouse.    Objective:     Communicated with RN prior to session.  Patient found HOB elevated with peripheral IV, pulse ox (continuous), telemetry  upon PT entry to room.    General Precautions: Standard, fall  Orthopedic Precautions:    Braces:    Respiratory Status: Room air  Blood Pressure: 130/77 supine, 136/86 sitting      Exams:  Sensation:    -       Impaired  pain withdrawal great toe absent and light/touch absent grossly throughout LLE  RLE Strength: WFL  LLE Strength: 0/5 on MMT, trace glute extension in supine. However with seated hip ABD/ADD testing pt resisted pressure from PT on LLE. No knee buckling on L in standing.  Skin integrity: Visible skin intact  (+) Hoovers sign on L in supine  Vision: mild impairment in smooth pursuits when tracking to L visual field, (-) cover/uncover test, (-) gaze-evoked nystagmus bilateral    Functional Mobility:  Bed Mobility:     Supine to Sit: minimum assistance  Sit to Supine: minimum assistance  Transfers:     Sit to Stand:  moderate assistance with hand-held assist  Balance: sitting balance = SBA      AM-PAC 6 CLICK MOBILITY  Total Score:12       Treatment & Education:  Pt performed lateral weight shifting in standing with HHAx2, unable to flex L hip against gravity in standing to advance L foot. Hesitant to bear weight into LLE in order to advance RLE. Returned to bed as pt going for swallow study.    Patient provided with verbal education education regarding PT role/goals/POC, fall prevention, safety awareness, and  discharge/DME recommendations.  Understanding was verbalized.     Patient left HOB elevated with all lines intact, call button in reach, and RN notified.    GOALS:   Multidisciplinary Problems       Physical Therapy Goals          Problem: Physical Therapy    Goal Priority Disciplines Outcome Interventions   Physical Therapy Goal     PT, PT/OT Progressing    Description: Goals to be met by: 25     Patient will increase functional independence with mobility by performin. Supine to sit with [place]  2. Sit to stand transfer with Modified [place]  3. Bed to chair transfer with Modified [place] using Least Restrictive Assistive Device  4. Gait  x 150 feet with Modified [place] using Least Restrictive Assistive Device.   5. Ascend/descend 5 stair with  Handrails [place] using No Assistive Device.                          History:     History reviewed. No pertinent past medical history.    History reviewed. No pertinent surgical history.    Time Tracking:     PT Received On: 25  PT Start Time: 1357     PT Stop Time: 1429  PT Total Time (min): 32 min     Billable Minutes: Evaluation MOD      2025

## 2025-05-22 ENCOUNTER — TELEPHONE (OUTPATIENT)
Dept: NEUROLOGY | Facility: CLINIC | Age: 65
End: 2025-05-22
Payer: MEDICARE

## 2025-05-22 LAB
ANION GAP SERPL CALC-SCNC: 8 MEQ/L
BUN SERPL-MCNC: 15.1 MG/DL (ref 8.4–25.7)
CALCIUM SERPL-MCNC: 8.9 MG/DL (ref 8.8–10)
CHLORIDE SERPL-SCNC: 105 MMOL/L (ref 98–107)
CO2 SERPL-SCNC: 28 MMOL/L (ref 23–31)
CREAT SERPL-MCNC: 0.92 MG/DL (ref 0.72–1.25)
CREAT/UREA NIT SERPL: 16
GFR SERPLBLD CREATININE-BSD FMLA CKD-EPI: >60 ML/MIN/1.73/M2
GLUCOSE SERPL-MCNC: 107 MG/DL (ref 82–115)
POTASSIUM SERPL-SCNC: 4 MMOL/L (ref 3.5–5.1)
SODIUM SERPL-SCNC: 141 MMOL/L (ref 136–145)

## 2025-05-22 PROCEDURE — 97112 NEUROMUSCULAR REEDUCATION: CPT

## 2025-05-22 PROCEDURE — 99233 SBSQ HOSP IP/OBS HIGH 50: CPT | Mod: ,,, | Performed by: PSYCHIATRY & NEUROLOGY

## 2025-05-22 PROCEDURE — 36415 COLL VENOUS BLD VENIPUNCTURE: CPT | Performed by: INTERNAL MEDICINE

## 2025-05-22 PROCEDURE — 63600175 PHARM REV CODE 636 W HCPCS: Performed by: INTERNAL MEDICINE

## 2025-05-22 PROCEDURE — 25000003 PHARM REV CODE 250: Performed by: INTERNAL MEDICINE

## 2025-05-22 PROCEDURE — 80048 BASIC METABOLIC PNL TOTAL CA: CPT | Performed by: INTERNAL MEDICINE

## 2025-05-22 PROCEDURE — 21400001 HC TELEMETRY ROOM

## 2025-05-22 PROCEDURE — 97530 THERAPEUTIC ACTIVITIES: CPT

## 2025-05-22 PROCEDURE — 97535 SELF CARE MNGMENT TRAINING: CPT

## 2025-05-22 RX ORDER — BUTALBITAL, ACETAMINOPHEN AND CAFFEINE 50; 325; 40 MG/1; MG/1; MG/1
1 TABLET ORAL EVERY 6 HOURS PRN
Status: DISCONTINUED | OUTPATIENT
Start: 2025-05-22 | End: 2025-05-26 | Stop reason: HOSPADM

## 2025-05-22 RX ORDER — SUMATRIPTAN SUCCINATE 25 MG/1
50 TABLET ORAL ONCE
Status: COMPLETED | OUTPATIENT
Start: 2025-05-22 | End: 2025-05-22

## 2025-05-22 RX ADMIN — ENOXAPARIN SODIUM 40 MG: 40 INJECTION SUBCUTANEOUS at 05:05

## 2025-05-22 RX ADMIN — GABAPENTIN 600 MG: 300 CAPSULE ORAL at 08:05

## 2025-05-22 RX ADMIN — ATORVASTATIN CALCIUM 40 MG: 40 TABLET, FILM COATED ORAL at 08:05

## 2025-05-22 RX ADMIN — TRAZODONE HYDROCHLORIDE 50 MG: 50 TABLET ORAL at 09:05

## 2025-05-22 RX ADMIN — TOPIRAMATE 25 MG: 25 TABLET, FILM COATED ORAL at 08:05

## 2025-05-22 RX ADMIN — SUMATRIPTAN SUCCINATE 50 MG: 25 TABLET ORAL at 05:05

## 2025-05-22 RX ADMIN — GABAPENTIN 600 MG: 300 CAPSULE ORAL at 03:05

## 2025-05-22 RX ADMIN — DULOXETINE 30 MG: 30 CAPSULE, DELAYED RELEASE ORAL at 08:05

## 2025-05-22 RX ADMIN — METOPROLOL TARTRATE 25 MG: 25 TABLET, FILM COATED ORAL at 09:05

## 2025-05-22 RX ADMIN — CLOPIDOGREL 75 MG: 75 TABLET ORAL at 08:05

## 2025-05-22 RX ADMIN — OXYCODONE AND ACETAMINOPHEN 1 TABLET: 10; 325 TABLET ORAL at 03:05

## 2025-05-22 RX ADMIN — TOPIRAMATE 25 MG: 25 TABLET, FILM COATED ORAL at 09:05

## 2025-05-22 RX ADMIN — GABAPENTIN 600 MG: 300 CAPSULE ORAL at 09:05

## 2025-05-22 RX ADMIN — AMITRIPTYLINE HYDROCHLORIDE 25 MG: 25 TABLET, FILM COATED ORAL at 09:05

## 2025-05-22 RX ADMIN — OXYCODONE AND ACETAMINOPHEN 1 TABLET: 10; 325 TABLET ORAL at 08:05

## 2025-05-22 RX ADMIN — ASPIRIN 81 MG CHEWABLE TABLET 81 MG: 81 TABLET CHEWABLE at 08:05

## 2025-05-22 RX ADMIN — METOPROLOL TARTRATE 25 MG: 25 TABLET, FILM COATED ORAL at 08:05

## 2025-05-22 NOTE — PT/OT/SLP PROGRESS
Physical Therapy Treatment    Patient Name:  Edison Jacobo   MRN:  [id number]    Recommendations:     Discharge therapy intensity: High Intensity Therapy   Discharge Equipment Recommendations: to be determined by next level of care  Barriers to discharge: Impaired mobility and Ongoing medical needs    Assessment:     Edison Jacobo is a 65 y.o. male admitted with a medical diagnosis of  L-sided weakness, MRI (-) acute CVA.  He presents with the following impairments/functional limitations: weakness, impaired endurance, impaired functional mobility, impaired self care skills, gait instability, impaired balance, visual deficits, decreased upper extremity function, decreased lower extremity function, impaired fine motor . Pt with improved mobility this date, still not active movement of LLE against gravity on command however with transfers and standing pt able to accept weight onto LLE instance and initiate advancement of LLE in swing. Pt also able to squeeze therapist's hand on command but when given a sponge to hold in LUE, put unable to flex fingers. Able to t/f to chair with RW and perform standing ADLs at sink with RUE, emphasis on WB in to LUE at counter.    Rehab Prognosis: Good; patient would benefit from acute skilled PT services to address these deficits and reach maximum level of function.    Recent Surgery: * No surgery found *      Plan:     During this hospitalization, patient would benefit from acute PT services 6 x/week to address the identified rehab impairments via gait training, therapeutic activities, therapeutic exercises, neuromuscular re-education and progress toward the following goals:    Plan of Care Expires:  06/21/25    Subjective     Chief Complaint: L-sided weakness  Patient/Family Comments/goals: PLOF  Pain/Comfort:  Pain Rating 1: 0/10      Objective:     Communicated with RN prior to session.  Patient found HOB elevated with peripheral IV, pulse ox (continuous), telemetry upon PT entry to  room.     General Precautions: Standard, fall  Orthopedic Precautions:    Braces:    Respiratory Status: Room air  Blood Pressure: NT  Skin Integrity: Visible skin intact      Functional Mobility:  Bed Mobility:     Supine to Sit: stand by assistance and with increased time an utilizing RUE to lift LLE off of bed  Transfers:     Sit to Stand:  minimum assistance with rolling walker and grab bars  Bed to Chair: minimum assistance and of 2 persons with  rolling walker  using  Stand Pivot  Balance: Static stand balance at sink with LUE support = CGA during hand washing    Therapeutic Activities/Exercises:  Stand pivot EOB>recliner. Recliner brought into bathroom, pt pulled to stand with Laya at sink. Brushed teeth with RUE, fair dynamic standing balance.   Demo'd use of towel-rubbing to increase sensory input to hemiplegic side  Attempted to have pt squeeze sponge in LUE for  strengthening, pt with minimal active finger flexion, able to flex thumb ~50% of range    Education:  Patient provided with verbal education education regarding PT role/goals/POC, fall prevention, safety awareness, and discharge/DME recommendations.  Understanding was verbalized.     Patient left up in chair with all lines intact, call button in reach, jesus pad in place, and RN notified    GOALS:   Multidisciplinary Problems       Physical Therapy Goals          Problem: Physical Therapy    Goal Priority Disciplines Outcome Interventions   Physical Therapy Goal     PT, PT/OT Progressing    Description: Goals to be met by: 25     Patient will increase functional independence with mobility by performin. Supine to sit with [place]  2. Sit to stand transfer with Modified [place]  3. Bed to chair transfer with Modified [place] using Least Restrictive Assistive Device  4. Gait  x 150 feet with Modified [place] using Least Restrictive Assistive Device.   5. Ascend/descend 5 stair with  Handrails [place] using No  Assistive Device.                          Time Tracking:     PT Received On: 05/22/25  PT Start Time: 0942     PT Stop Time: 1005  PT Total Time (min): 23 min     Billable Minutes: Therapeutic Activity 13 and Neuromuscular Re-education 10    Treatment Type: Treatment  PT/PTA: PT     Number of PTA visits since last PT visit: 1 05/22/2025

## 2025-05-22 NOTE — PROGRESS NOTES
[facility] - Neurology  Neurology  Progress Note    Patient Name: Edison Jacobo  MRN: [id number]  Admission Date: 5/20/2025  Hospital Length of Stay: 2 days  Code Status: Full Code   Attending Provider: Marisol Pfeiffer MD  Primary Care Physician: Royce Canchola MD   Principal Problem:<principal problem not specified>    HPI:   No notes on file    Overview/Hospital Course:  No notes on file        Subjective:     Interval History: no acute events over night. Patient states he can put some pressure on left arm and slight movement of left thumb today.     Current Medications[1]    Review of Systems   Constitutional:  Positive for activity change. Negative for appetite change.   Neurological:  Positive for facial asymmetry (mild left), weakness (left sided) and headaches. Negative for dizziness and light-headedness.   Psychiatric/Behavioral:  Negative for agitation and behavioral problems.      Objective:     Vital Signs (Most Recent):  Temp: 98 °F (36.7 °C) (05/22/25 0723)  Pulse: 64 (05/22/25 0855)  Resp: 16 (05/22/25 0825)  BP: 121/79 (05/22/25 0855)  SpO2: (!) 89 % (05/22/25 0723) Vital Signs (24h Range):  Temp:  [97.4 °F (36.3 °C)-98.6 °F (37 °C)] 98 °F (36.7 °C)  Pulse:  [60-95] 64  Resp:  [16-18] 16  SpO2:  [89 %-93 %] 89 %  BP: (109-121)/(70-81) 121/79     Weight: 99.3 kg (218 lb 14.7 oz)  Body mass index is 27.36 kg/m².     Physical Exam  Vitals and nursing note reviewed.   Constitutional:       Appearance: Normal appearance.   HENT:      Head: Normocephalic and atraumatic.      Right Ear: External ear normal.      Left Ear: External ear normal.      Nose: Nose normal.      Mouth/Throat:      Mouth: Mucous membranes are moist.      Pharynx: Oropharynx is clear.   Eyes:      General: No visual field deficit.     Extraocular Movements: Extraocular movements intact.      Conjunctiva/sclera: Conjunctivae normal.      Pupils: Pupils are equal, round, and reactive to light.   Cardiovascular:       Rate and Rhythm: Normal rate.   Pulmonary:      Effort: Pulmonary effort is normal.   Abdominal:      Palpations: Abdomen is soft.   Skin:     General: Skin is warm and dry.   Neurological:      Mental Status: He is alert and oriented to person, place, and time.      GCS: GCS eye subscore is 4. GCS verbal subscore is 5. GCS motor subscore is 6.      Cranial Nerves: Facial asymmetry (very mild left sided) present. No dysarthria.      Sensory: Sensory deficit (LUE and LLE) present.      Motor: Weakness (slight movement of left thumb, able to put slight pressure against left index finger with left thumb. has slight downward pressure of left arm with resistance testing. Unable to lift left arm off bed. LE unable to lift off bed, able to put dnward pressure) present. No tremor, atrophy or abnormal muscle tone.      Comments: Normal 5/5 strength on right.    Psychiatric:         Mood and Affect: Mood normal.         Behavior: Behavior normal.         Thought Content: Thought content normal.         Judgment: Judgment normal.          NEUROLOGICAL EXAMINATION:     MENTAL STATUS   Oriented to person, place, and time.     CRANIAL NERVES     CN III, IV, VI   Pupils are equal, round, and reactive to light.      Significant Labs: All pertinent lab results from the past 24 hours have been reviewed.    Significant Imaging: I have reviewed all pertinent imaging results/findings within the past 24 hours.    Assessment and Plan:     Left-sided weakness  - presented with Left sided paralysis, slurred speech, left facial droop  - Stroke RF: HTN, CAD, CVA  - Intervention: none, stroke mimic suspected with no acute findings on MRI brain limited  - Etiology: TBD    Stroke workup:  -CTh:  No acute intracranial hemorrhage.   - CTP: No definite territorial perfusion mismatch.   -CTA h/n: No evidence of large vessel occlusion. Severe stenosis of the left vertebral artery origin without evidence of downstream stenosis.   -MRI brain w/o  limited: Impression:  1. No acute infarct.  2. Mild chronic microvascular ischemic changes.   -MRI brain w/wo contrast: Impression:  1. No evidence of intracranial metastatic disease.  2. Mild chronic microvascular ischemic changes.  -MRI cervical spine w/wo contrast: Impression:  1. Moderate degenerative narrowing of the spinal canal at C3-C7.  2. Multilevel neural foraminal stenoses as described.  3. No cord signal abnormality or abnormal enhancement.  -ECHO: (6/10/2024) EF: 55-60%. There was 6 mL of intravenous agitated saline (bubble) used. Study is negative for shunt.    -LDL: 75   -A1c: 5.6% (6/10/2024)    -TSH: 0.654   -home medications include: ASA 81 mg and Plavix 75 mg daily.    NIH Stroke Scale      1a  Level of consciousness: 0=alert; keenly responsive   1b. LOC questions:  0=Answers both tasks correctly   1c. LOC commands: 0=Answers both tasks correctly   2.  Best Gaze: 0=normal   3.  Visual: 0=No visual loss   4. Facial Palsy: 0=Normal symmetric movement   5a.  Motor left arm: 4=No movement   5b.  Motor right arm: 0=No drift, limb holds 90 (or 45) degrees for full 10 seconds   6a. motor left le=No movement   6b  Motor right le=No drift, limb holds 90 (or 45) degrees for full 10 seconds   7. Limb Ataxia: 0=Absent   8.  Sensory: 2=Severe to total sensory loss; patient is not aware of being touched in face, arm, leg   9. Best Language:  0=No aphasia, normal   10. Dysarthria: 0=Normal   11. Extinction and Inattention: 1=Visual, tactile, auditory, spatial or personal inattention or extinction to bilateral simultaneous stimulation in one of the sensory modalities   12. Distal motor function: 0=Normal    Total:   11     Assessement:  -no acute stroke or intracranial mass   -persisting left sided paralysis UE/LE, left facial droop.  -hemiplegic migraine in differential     Plan:  -continue Aspirin 81mg daily and Plavix 75mg daily  -continue Atorvastatin 40mg daily  - regarding left vertebral artery  stenosis, Dr Aguiar reviewed imaging, recommendation to continue dual antiplatelet therapy and statin.   - follow up in 1 month with Mora Evangelista NP, Dr Aguiar's out patient interventional neurology clinic for consideration for out patient DSA   - okay to normalize blood pressure  - consideration for in patient rehab   - continued care with hospital medicine     - other recommendations and follow up as per MD               VTE Risk Mitigation (From admission, onward)           Ordered     enoxaparin injection 40 mg  Every 24 hours         05/20/25 2136     IP VTE LOW RISK PATIENT  Once         05/20/25 1228     Place sequential compression device  Until discontinued         05/20/25 1228                    Daxa Mirza NP  Neurology  [facility] - Neurology       [1]   Current Facility-Administered Medications   Medication Dose Route Frequency Provider Last Rate Last Admin    amitriptyline tablet 25 mg  25 mg Oral QHS Marisol Pfeiffer MD   25 mg at 05/21/25 2002    aspirin chewable tablet 81 mg  81 mg Oral Daily Marisol Pfeiffer MD   81 mg at 05/22/25 0855    atorvastatin tablet 40 mg  40 mg Oral Daily Marisol Pfeiffer MD   40 mg at 05/22/25 0855    bisacodyL suppository 10 mg  10 mg Rectal Daily PRN Carla Villegas, LILLIAN        clopidogreL tablet 75 mg  75 mg Oral Daily Marisol Pfeiffer MD   75 mg at 05/22/25 0855    DULoxetine DR capsule 30 mg  30 mg Oral Daily Marisol Pfeiffer MD   30 mg at 05/22/25 0855    enoxaparin injection 40 mg  40 mg Subcutaneous Q24H (prophylaxis, 1700) Marisol Pfeiffer MD   40 mg at 05/21/25 1633    gabapentin capsule 600 mg  600 mg Oral TID Marisol Pfeiffer MD   600 mg at 05/22/25 0855    labetaloL injection 10 mg  10 mg Intravenous Q2H PRN Carla Villegas, LILLIAN        metoprolol tartrate (LOPRESSOR) tablet 25 mg  25 mg Oral BID Marisol Pfeiffer MD   25 mg at 05/22/25 0855    nicotine 14 mg/24 hr 1 patch  1 patch Transdermal Daily Marisol Pfeiffer MD         oxyCODONE-acetaminophen  mg per tablet 1 tablet  1 tablet Oral Q6H PRN Marisol Pfeiffer MD   1 tablet at 05/22/25 0825    sodium chloride 0.9% flush 10 mL  10 mL Intravenous PRN Carla Villegas FNP        topiramate tablet 25 mg  25 mg Oral BID Marisol Pfeiffer MD   25 mg at 05/22/25 0855    traZODone tablet 50 mg  50 mg Oral QHS Marisol Pfeiffer MD   50 mg at 05/21/25 2002

## 2025-05-22 NOTE — TELEPHONE ENCOUNTER
Pt wife saying he is at [facility] with similar symptoms he was having prior to his last stroke. Pt wanted to speak with Mora I advised her she was out today and if needed, the neurology on call doctor would assist patient. Patient's wife verbalized understanding.

## 2025-05-22 NOTE — PT/OT/SLP PROGRESS
Occupational Therapy   Treatment    Name: Edison Jacobo  MRN: [id number]    Recommendations:     Recommended therapy intensity at discharge: High Intensity Therapy   Discharge Equipment Recommendations:  to be determined by next level of care  Barriers to discharge:  Other (Comment) (ongoing medical needs)    Assessment:     Edison Jacobo is a 65 y.o. male with a medical diagnosis of L side weakness, CVA workup. Performance deficits affecting function are weakness, impaired endurance, impaired sensation, impaired self care skills, impaired functional mobility, gait instability, impaired balance, decreased upper extremity function, decreased lower extremity function, decreased ROM, impaired coordination. Patient required Kala for functional mobility and oral hygiene task while standing at sink. Patient continues to demonstrate fluctuation in L UE/LE AROM as indicated by improved functional performance in observation vs continued difficulties initiating motor movements following direct commands. Patient provided HEP of sponge squeezes, AAROM shoulder flexion, and table slides. Patient reports minimal sensation throughout L UE/LE. Patient would benefit from high intensity therapy at d/c.    Rehab Prognosis:  Good; patient would benefit from acute skilled OT services to address these deficits and reach maximum level of function.       Plan:     Patient to be seen 6 x/week to address the above listed problems via self-care/home management, therapeutic activities, therapeutic exercises, neuromuscular re-education, cognitive retraining  Plan of Care Expires: 06/21/25  Plan of Care Reviewed with: patient    Subjective     Pain/Comfort:  Pain Rating 1: 0/10    Objective:     Communicated with: NSCARLOZ prior to session.  Patient found HOB elevated with peripheral IV, pulse ox (continuous), telemetry upon OT entry to room.    General Precautions: Standard, fall    Orthopedic Precautions:N/A  Braces: N/A  Respiratory Status: Room  air  Vital Signs: Respiratory Status: on room air     Occupational Performance:     Bed Mobility:    Patient completed Supine to Sit with minimum assistance to push through LUE to assume seated position    Functional Mobility/Transfers:  Patient completed Sit <> Stand Transfer with minimum assistance with rolling walker x1 and grab bars x1 at sink  Patient completed Bed <> Chair Transfer using Step Transfer technique with minimum assistance with rolling walker    Activities of Daily Living:  Grooming: minimum assistance to complete oral hygiene while standing with LUE in WB position on counter and RUE used for all excursions. Patient independently maintained LUE in WB position during weight shifting and reaching across ML with RUE. When ascending from stand to sit after ax, patient moved LUE from counter, onto hand rail, then into lap independently.    Therapeutic Positioning    OT interventions performed during the course of today's session in an effort to prevent and/or reduce acquired pressure injuries:   Education was provided on benefits of and recommendations for therapeutic positioning  Therapeutic positioning was provided at the conclusion of session to offload all bony prominences for the prevention and/or reduction of pressure injuries      [facility] [address] 6 Click ADL: 15    Patient Education:  Patient provided with verbal education education regarding OT role/goals/POC, fall prevention, safety awareness, Discharge/DME recommendations, pressure ulcer prevention, and home exercise program .  Understanding was verbalized.      Patient left up in chair with all lines intact, call button in reach, and NSG notified, left on geomat and jesus pad.    GOALS:   Multidisciplinary Problems       Occupational Therapy Goals          Problem: Occupational Therapy    Goal Priority Disciplines Outcome Interventions   Occupational Therapy Goal     OT, PT/OT Progressing    Description: LTG: Pt will perform basic ADLs and ADL  transfers with SPV using LRAD by discharge.    STG: to be met by 6/21/25    Pt will complete grooming standing at sink with LRAD with min A.  Pt will complete UB dressing with min A.  Pt will complete LB dressing with min A using LRAD.  Pt will complete toileting with min A using LRAD.  Pt will complete functional mobility to/from toilet and toilet transfer with min A using LRAD.   Pt will demo increased strength in L UE to 3/5 MMT for increased functional use during ADL tasks.                        Time Tracking:     OT Date of Treatment: 05/22/25  OT Start Time: 0941  OT Stop Time: 1005  OT Total Time (min): 24 min    Billable Minutes:Self Care/Home Management 2    OT/OLYA: OT     Number of OLYA visits since last OT visit: 1 5/22/2025

## 2025-05-22 NOTE — PLAN OF CARE
Discussed recommendation for high intensity therapy post discharge. Given list of inpatient rehab providers.  FOC obtained.  Referral sent to [facility].

## 2025-05-22 NOTE — CARE UPDATE
Discussed with Dr. Ferrear after rounding.  Stroke neurology will sign off. Please contact us if you have any additional questions.      Daxa Mirza NP

## 2025-05-22 NOTE — PT/OT/SLP PROGRESS
[facility]  Speech Language Pathology Department  Diet Tolerance Follow-up    Patient Name:  Edison Jacobo   MRN:  [id number]    Recommendations     General recommendations:  dysphagia therapy  Solid texture recommendation:  Soft & Bite Sized Diet - IDDSI Level 6  Liquid consistency recommendation: Thin liquids - IDDSI Level 0   Medications: per patient preference  Precautions: aspiration    Diet Tolerance     Nursing reports no difficulty regarding diet tolerance.    Outcome Measures     Functional Oral Intake Scale: 5 - Total oral diet with multiple consistencies, by requiring special preparation or compensations    Plan     SLP to follow up regarding solids upgrade.    05/22/2025

## 2025-05-22 NOTE — SUBJECTIVE & OBJECTIVE
Subjective:     Interval History: no acute events over night. Patient states he can put some pressure on left arm and slight movement of left thumb today.     Current Medications[1]    Review of Systems   Constitutional:  Positive for activity change. Negative for appetite change.   Neurological:  Positive for facial asymmetry (mild left), weakness (left sided) and headaches. Negative for dizziness and light-headedness.   Psychiatric/Behavioral:  Negative for agitation and behavioral problems.      Objective:     Vital Signs (Most Recent):  Temp: 98 °F (36.7 °C) (05/22/25 0723)  Pulse: 64 (05/22/25 0855)  Resp: 16 (05/22/25 0825)  BP: 121/79 (05/22/25 0855)  SpO2: (!) 89 % (05/22/25 0723) Vital Signs (24h Range):  Temp:  [97.4 °F (36.3 °C)-98.6 °F (37 °C)] 98 °F (36.7 °C)  Pulse:  [60-95] 64  Resp:  [16-18] 16  SpO2:  [89 %-93 %] 89 %  BP: (109-121)/(70-81) 121/79     Weight: 99.3 kg (218 lb 14.7 oz)  Body mass index is 27.36 kg/m².     Physical Exam  Vitals and nursing note reviewed.   Constitutional:       Appearance: Normal appearance.   HENT:      Head: Normocephalic and atraumatic.      Right Ear: External ear normal.      Left Ear: External ear normal.      Nose: Nose normal.      Mouth/Throat:      Mouth: Mucous membranes are moist.      Pharynx: Oropharynx is clear.   Eyes:      General: No visual field deficit.     Extraocular Movements: Extraocular movements intact.      Conjunctiva/sclera: Conjunctivae normal.      Pupils: Pupils are equal, round, and reactive to light.   Cardiovascular:      Rate and Rhythm: Normal rate.   Pulmonary:      Effort: Pulmonary effort is normal.   Abdominal:      Palpations: Abdomen is soft.   Skin:     General: Skin is warm and dry.   Neurological:      Mental Status: He is alert and oriented to person, place, and time.      GCS: GCS eye subscore is 4. GCS verbal subscore is 5. GCS motor subscore is 6.      Cranial Nerves: Facial asymmetry (very mild left sided) present.  No dysarthria.      Sensory: Sensory deficit (LUE and LLE) present.      Motor: Weakness (slight movement of left thumb, able to put slight pressure against left index finger with left thumb. has slight downward pressure of left arm with resistance testing. Unable to lift left arm off bed. LE unable to lift off bed, able to put dnward pressure) present. No tremor, atrophy or abnormal muscle tone.      Comments: Normal 5/5 strength on right.    Psychiatric:         Mood and Affect: Mood normal.         Behavior: Behavior normal.         Thought Content: Thought content normal.         Judgment: Judgment normal.          NEUROLOGICAL EXAMINATION:     MENTAL STATUS   Oriented to person, place, and time.     CRANIAL NERVES     CN III, IV, VI   Pupils are equal, round, and reactive to light.      Significant Labs: All pertinent lab results from the past 24 hours have been reviewed.    Significant Imaging: I have reviewed all pertinent imaging results/findings within the past 24 hours.       [1]   Current Facility-Administered Medications   Medication Dose Route Frequency Provider Last Rate Last Admin    amitriptyline tablet 25 mg  25 mg Oral QHS Marisol Pfeiffer MD   25 mg at 05/21/25 2002    aspirin chewable tablet 81 mg  81 mg Oral Daily Marisol Pfeiffer MD   81 mg at 05/22/25 0855    atorvastatin tablet 40 mg  40 mg Oral Daily Marisol Pfeiffer MD   40 mg at 05/22/25 0855    bisacodyL suppository 10 mg  10 mg Rectal Daily PRN Carla Villegas FNP        clopidogreL tablet 75 mg  75 mg Oral Daily Marisol Pfeiffer MD   75 mg at 05/22/25 0855    DULoxetine DR capsule 30 mg  30 mg Oral Daily Marisol Pfeiffer MD   30 mg at 05/22/25 0855    enoxaparin injection 40 mg  40 mg Subcutaneous Q24H (prophylaxis, 1700) Marisol Pfeiffer MD   40 mg at 05/21/25 1633    gabapentin capsule 600 mg  600 mg Oral TID Marisol Pfeiffer MD   600 mg at 05/22/25 0855    labetaloL injection 10 mg  10 mg Intravenous Q2H PRN Carla Villegas,  FNP        metoprolol tartrate (LOPRESSOR) tablet 25 mg  25 mg Oral BID Marisol Pfeiffer MD   25 mg at 05/22/25 0855    nicotine 14 mg/24 hr 1 patch  1 patch Transdermal Daily Marisol Pfeiffer MD        oxyCODONE-acetaminophen  mg per tablet 1 tablet  1 tablet Oral Q6H PRN Marisol Pfeiffer MD   1 tablet at 05/22/25 0825    sodium chloride 0.9% flush 10 mL  10 mL Intravenous PRN Carla Villegas, FNP        topiramate tablet 25 mg  25 mg Oral BID Marisol Pfeiffer MD   25 mg at 05/22/25 0855    traZODone tablet 50 mg  50 mg Oral QHS Marisol Pfeiffer MD   50 mg at 05/21/25 2002

## 2025-05-23 PROCEDURE — 97116 GAIT TRAINING THERAPY: CPT

## 2025-05-23 PROCEDURE — 97530 THERAPEUTIC ACTIVITIES: CPT

## 2025-05-23 PROCEDURE — 25000003 PHARM REV CODE 250: Performed by: INTERNAL MEDICINE

## 2025-05-23 PROCEDURE — 97535 SELF CARE MNGMENT TRAINING: CPT

## 2025-05-23 PROCEDURE — 21400001 HC TELEMETRY ROOM

## 2025-05-23 PROCEDURE — 97110 THERAPEUTIC EXERCISES: CPT

## 2025-05-23 PROCEDURE — 63600175 PHARM REV CODE 636 W HCPCS: Performed by: INTERNAL MEDICINE

## 2025-05-23 RX ADMIN — ENOXAPARIN SODIUM 40 MG: 40 INJECTION SUBCUTANEOUS at 04:05

## 2025-05-23 RX ADMIN — TRAZODONE HYDROCHLORIDE 50 MG: 50 TABLET ORAL at 09:05

## 2025-05-23 RX ADMIN — METOPROLOL TARTRATE 25 MG: 25 TABLET, FILM COATED ORAL at 09:05

## 2025-05-23 RX ADMIN — DULOXETINE 30 MG: 30 CAPSULE, DELAYED RELEASE ORAL at 08:05

## 2025-05-23 RX ADMIN — GABAPENTIN 600 MG: 300 CAPSULE ORAL at 03:05

## 2025-05-23 RX ADMIN — TOPIRAMATE 25 MG: 25 TABLET, FILM COATED ORAL at 08:05

## 2025-05-23 RX ADMIN — TOPIRAMATE 25 MG: 25 TABLET, FILM COATED ORAL at 09:05

## 2025-05-23 RX ADMIN — ATORVASTATIN CALCIUM 40 MG: 40 TABLET, FILM COATED ORAL at 08:05

## 2025-05-23 RX ADMIN — GABAPENTIN 600 MG: 300 CAPSULE ORAL at 08:05

## 2025-05-23 RX ADMIN — OXYCODONE AND ACETAMINOPHEN 1 TABLET: 10; 325 TABLET ORAL at 09:05

## 2025-05-23 RX ADMIN — METOPROLOL TARTRATE 25 MG: 25 TABLET, FILM COATED ORAL at 08:05

## 2025-05-23 RX ADMIN — AMITRIPTYLINE HYDROCHLORIDE 25 MG: 25 TABLET, FILM COATED ORAL at 09:05

## 2025-05-23 RX ADMIN — GABAPENTIN 600 MG: 300 CAPSULE ORAL at 09:05

## 2025-05-23 RX ADMIN — CLOPIDOGREL 75 MG: 75 TABLET ORAL at 08:05

## 2025-05-23 RX ADMIN — OXYCODONE AND ACETAMINOPHEN 1 TABLET: 10; 325 TABLET ORAL at 08:05

## 2025-05-23 RX ADMIN — ASPIRIN 81 MG CHEWABLE TABLET 81 MG: 81 TABLET CHEWABLE at 08:05

## 2025-05-23 NOTE — PLAN OF CARE
Problem: Adult Inpatient Plan of Care  Goal: Optimal Comfort and Wellbeing  Outcome: Progressing     Problem: Skin Injury Risk Increased  Goal: Skin Health and Integrity  Outcome: Progressing     Problem: Stroke, Ischemic (Includes Transient Ischemic Attack)  Goal: Optimal Coping  Outcome: Progressing     Problem: Stroke, Ischemic (Includes Transient Ischemic Attack)  Goal: Optimal Cerebral Tissue Perfusion  Outcome: Progressing     Problem: Stroke, Ischemic (Includes Transient Ischemic Attack)  Goal: Optimal Functional Ability  Outcome: Progressing     Problem: Stroke, Ischemic (Includes Transient Ischemic Attack)  Goal: Optimal Nutrition Intake  Outcome: Progressing

## 2025-05-23 NOTE — PT/OT/SLP PROGRESS
Physical Therapy Treatment    Patient Name:  Edison Jacobo   MRN:  [id number]    Recommendations:     Discharge therapy intensity: High Intensity Therapy   Discharge Equipment Recommendations: to be determined by next level of care  Barriers to discharge: Impaired mobility    Assessment:     Edison Jacobo is a 65 y.o. male admitted with a medical diagnosis of  L-sided weakness, MRI (-) acute CVA.  He presents with the following impairments/functional limitations: weakness, impaired endurance, impaired functional mobility, impaired self care skills, gait instability, impaired balance, visual deficits, decreased upper extremity function, decreased lower extremity function, impaired fine motor . Pt with improved mobility today, minAx1 for transfer to chair and short distance ambulation with RW. Initially unable to advance LLE at all in swing however with repetition pt able to initiate hip flexion and swing through ~50% of range. LLE fully supporting weight in stance as well was LUE grasp on RW. Remains appropriate for high intensity therapy.    Rehab Prognosis: Good; patient would benefit from acute skilled PT services to address these deficits and reach maximum level of function.    Recent Surgery: * No surgery found *      Plan:     During this hospitalization, patient would benefit from acute PT services 6 x/week to address the identified rehab impairments via gait training, therapeutic activities, therapeutic exercises, neuromuscular re-education and progress toward the following goals:    Plan of Care Expires:  06/21/25    Subjective     Chief Complaint: L weakness  Patient/Family Comments/goals: PLOF  Pain/Comfort:  Pain Rating 1: 0/10      Objective:     Communicated with RN prior to session.  Patient found HOB elevated with telemetry, peripheral IV upon PT entry to room.     General Precautions: Standard, fall  Orthopedic Precautions:    Braces:    Respiratory Status: Room air  Blood Pressure: 132/71 seated after  amb, stated he was dizzy  Skin Integrity: Visible skin intact      Functional Mobility:  Bed Mobility:     Supine to Sit: standby assist with increased time  Transfers:     Sit to Stand:  minimum assistance with rolling walker  Bed to Chair: minimum assistance with  rolling walker  using  stand pivot; needed assist to advance LLE  Gait: 1x6ft and 1x12ft with RW with Laya, first steps unable to initiate hip flexion. By end of second trial pt able to initiate hip/knee advancement, required physical assist to achieve full step length. No L knee buckling in stance. No trunk sway.  Balance: Static standing balance with RW = CGA    Therapeutic Activities/Exercises:  Cues for hand placement with transfers    Education:  Patient provided with verbal education education regarding PT role/goals/POC, fall prevention, safety awareness, and discharge/DME recommendations.  Understanding was verbalized.     Patient left up in chair with all lines intact, call button in reach, and RN notified    GOALS:   Multidisciplinary Problems       Physical Therapy Goals          Problem: Physical Therapy    Goal Priority Disciplines Outcome Interventions   Physical Therapy Goal     PT, PT/OT Progressing    Description: Goals to be met by: 25     Patient will increase functional independence with mobility by performin. Supine to sit with [place]  2. Sit to stand transfer with Modified [place]  3. Bed to chair transfer with Modified [place] using Least Restrictive Assistive Device  4. Gait  x 150 feet with Modified [place] using Least Restrictive Assistive Device.   5. Ascend/descend 5 stair with  Handrails Winslow using No Assistive Device.                          Time Tracking:     PT Received On: 25  PT Start Time: 901     PT Stop Time: 935  PT Total Time (min): 34 min     Billable Minutes: Gait Training 24 and Therapeutic Activity 10    Treatment Type: Treatment  PT/PTA: PT     Number of PTA  visits since last PT visit: 2     05/23/2025

## 2025-05-23 NOTE — PLAN OF CARE
Problem: Adult Inpatient Plan of Care  Goal: Plan of Care Review  Outcome: Progressing     Problem: Skin Injury Risk Increased  Goal: Skin Health and Integrity  Outcome: Progressing     Problem: Stroke, Ischemic (Includes Transient Ischemic Attack)  Goal: Optimal Coping  Outcome: Progressing

## 2025-05-23 NOTE — PLAN OF CARE
[facility] is offering peer to peer with Dr. Mccauley for inpatient rehab.  Scheduled for 1330 CST today.  Dr. Mccauley notified through Secure Chat.

## 2025-05-23 NOTE — NURSING
Pt choked on rice with dinner, states this is the first time he has choked on his new modified diet. MD aware, advises to monitor for now.   
Airway patent, Nasal mucosa clear. Mouth with normal mucosa. Throat has no vesicles, no oropharyngeal exudates and uvula is midline.

## 2025-05-23 NOTE — PROGRESS NOTES
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  Patient presented to the ED with complaints of acute onset left-sided weakness with associated numbness on the whole left side including the face and left-sided peripheral vision loss.  Patient reports having posterior headache prior to the onset of symptoms.  He was cutting grass when symptoms started.  Patient on dual antiplatelets at home and is well compliant.  Bradycardic in the ED. BP slightly accelerated.  Labs essentially unremarkable.  CT head was negative.  No territorial perfusion mismatch.  CT angiogram with severe left vertebral stenosis.  MRI brain negative for acute infarct.  Stroke Neurology consulted.  Hospital medicine services consulted for admission.  Patient evaluated by stroke Neurology/Interventional Neurology team, no plans for immediate intervention for vertebral artery stenosis, recommended to continue dual antiplatelets and high-intensity statin, since MRI brain without contrast was negative MRI brain and MRI C-spine with and without contrast was ordered-both non impressive, speech therapy recommended NPO pending MBS, initiated aspirin suppository and plans to reinitiate home medications once he is cleared for by mouth intake, echo non impressive, therapy services consulted     Today: Patient seen and examined at bedside, and chart reviewed.  No changes.  Working with therapy.      MEDICATIONS   Scheduled   amitriptyline  25 mg Oral QHS    aspirin  81 mg Oral Daily    atorvastatin  40 mg Oral Daily    clopidogreL  75 mg Oral Daily    DULoxetine  30 mg Oral Daily    enoxparin   40 mg Subcutaneous Q24H (prophylaxis, 1700)    gabapentin  600 mg Oral TID    metoprolol tartrate  25 mg Oral BID    nicotine  1 patch Transdermal Daily    topiramate  25 mg Oral BID    traZODone  50 mg Oral QHS     Continuous Infusions  None      PHYSICAL EXAM   VITALS: T 98 °F (36.7 °C)   BP (!) 144/77   P 72   RR 16   O2 (!) 90 %    GENERAL: Awake and in NAD  UNGS: CTA B/L  CVS: Normal rate  GI/: Soft, NT, bowel sounds positive.  EXTREMITIES: No LE edema  NEURO: AAOx3  PSYCH: Cooperative      LABS   CBC  Recent Labs     05/20/25  1026 05/20/25  1050 05/21/25  0323   WBC 7.46  --  7.12   RBC 4.83  --  4.56*   HGB 14.2  --  13.7*   HCT 43.7 38 41.7*   MCV 90.5  --  91.4   MCH 29.4  --  30.0   MCHC 32.5*  --  32.9*   RDW 12.9  --  13.0     --  164     CHEM  Recent Labs     05/20/25  1026 05/21/25  0323 05/22/25  0350    139 141   K 4.3 4.1 4.0   CO2 22* 26 28   BUN 17.8 14.6 15.1   CREATININE 0.92 0.80 0.92   CALCIUM 9.0 9.0 8.9   MG 2.00 2.00  --    PHOS  --  3.4  --    ALBUMIN 3.9 3.5  --    GLOBULIN 3.0 3.0  --    ALKPHOS 65 65  --    ALT 60* 58*  --    AST 34 35  --    BILITOT 0.7 0.9  --    TSH 0.654  --   --          ASSESSMENT   Possible compliated migraine, CVA ruled out  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

## 2025-05-23 NOTE — PT/OT/SLP PROGRESS
Occupational Therapy   Treatment    Name: Edison Jacobo  MRN: [id number]    Recommendations:     Recommended therapy intensity at discharge: High Intensity Therapy   Discharge Equipment Recommendations:  to be determined by next level of care  Barriers to discharge:       Assessment:     Edison Jacobo is a 65 y.o. male with a medical diagnosis of L side weakness, CVA workup. Performance deficits affecting function are weakness, impaired endurance, impaired sensation, impaired self care skills, impaired functional mobility, gait instability, impaired balance, decreased upper extremity function, decreased lower extremity function, decreased ROM, impaired coordination.     Pt continues to demo impaired motor control/sensation in LUE/LLE. Continues to require increased assist for all ADLs and mobility. Pt is a high fall risk and would benefit from high intensity therapy at d/c.    Rehab Prognosis:  Good; patient would benefit from acute skilled OT services to address these deficits and reach maximum level of function.       Plan:     Patient to be seen 6 x/week to address the above listed problems via self-care/home management, therapeutic activities, therapeutic exercises, neuromuscular re-education, cognitive retraining  Plan of Care Expires: 06/21/25  Plan of Care Reviewed with: patient    Subjective     Pain/Comfort:  Pain Rating 1: 0/10    Objective:     Communicated with: NSCARLOZ prior to session.  Patient found HOB elevated with pulse ox (continuous), telemetry upon OT entry to room.    General Precautions: Standard, fall    Orthopedic Precautions:N/A  Braces: N/A  Respiratory Status: Room air  Vital Signs: Respiratory Status: on room air     Occupational Performance:     Functional Mobility/Transfers:  Patient completed Sit <> Stand Transfer with minimum assistance with rolling walker   Ambulates ~3ft in room with min A for balance and assist to advance LLE x2. Pt with decreased left hip flex and and dorsiflexion to  adequately clear foot off ground.   Note pt hand not eaten lunch and c/o mild dizziness/fatigue, likely impacting his endurance.     Activities of Daily Living:  Lower Body Dressing: maximal assistance to don B socks.   Pt attempts to don R sock in fig four position. Max A to initiate threading over forefoot. Instructed modified technique to hook left thumb to assist pulling LUE over heel - promoting incorporation of LUE. Difficulty maintaining grasp of sock, ultimately requiring mod A.   Therapeutic exercise:   Instructed self-AAROM with BUE; 2x10 per exercises:  straight arm shoulder flex  Elbow flex/ext  Hand AROM.   Pt able to return demo with good performance.     [facility] 6 Click ADL: 15    Patient Education:  Patient provided with verbal education education regarding OT role/goals/POC, fall prevention, safety awareness, Discharge/DME recommendations, pressure ulcer prevention, and home exercise program .  Understanding was verbalized.    Patient left up in chair with all lines intact, call button in reach, geomat cushion, jesus pad in place, and NSG notified.    GOALS:   Multidisciplinary Problems       Occupational Therapy Goals          Problem: Occupational Therapy    Goal Priority Disciplines Outcome Interventions   Occupational Therapy Goal     OT, PT/OT Progressing    Description: LTG: Pt will perform basic ADLs and ADL transfers with SPV using LRAD by discharge.    STG: to be met by 6/21/25    Pt will complete grooming standing at sink with LRAD with min A.  Pt will complete UB dressing with min A.  Pt will complete LB dressing with min A using LRAD.  Pt will complete toileting with min A using LRAD.  Pt will complete functional mobility to/from toilet and toilet transfer with min A using LRAD.   Pt will demo increased strength in L UE to 3/5 MMT for increased functional use during ADL tasks.                        Time Tracking:     OT Date of Treatment: 05/23/25  OT Start Time: 1307  OT Stop Time:  1330  OT Total Time (min): 23 min    Billable Minutes:Self Care/Home Management 1  Therapeutic Exercise 1    OT/OLYA: OT     Number of OLYA visits since last OT visit: 2    5/23/2025

## 2025-05-24 PROCEDURE — 97530 THERAPEUTIC ACTIVITIES: CPT | Mod: CQ

## 2025-05-24 PROCEDURE — 27000221 HC OXYGEN, UP TO 24 HOURS

## 2025-05-24 PROCEDURE — 99900035 HC TECH TIME PER 15 MIN (STAT)

## 2025-05-24 PROCEDURE — 63600175 PHARM REV CODE 636 W HCPCS: Performed by: INTERNAL MEDICINE

## 2025-05-24 PROCEDURE — 25000003 PHARM REV CODE 250: Performed by: INTERNAL MEDICINE

## 2025-05-24 PROCEDURE — 21400001 HC TELEMETRY ROOM

## 2025-05-24 RX ADMIN — GABAPENTIN 600 MG: 300 CAPSULE ORAL at 08:05

## 2025-05-24 RX ADMIN — TOPIRAMATE 25 MG: 25 TABLET, FILM COATED ORAL at 08:05

## 2025-05-24 RX ADMIN — OXYCODONE AND ACETAMINOPHEN 1 TABLET: 10; 325 TABLET ORAL at 08:05

## 2025-05-24 RX ADMIN — ASPIRIN 81 MG CHEWABLE TABLET 81 MG: 81 TABLET CHEWABLE at 08:05

## 2025-05-24 RX ADMIN — TRAZODONE HYDROCHLORIDE 50 MG: 50 TABLET ORAL at 08:05

## 2025-05-24 RX ADMIN — AMITRIPTYLINE HYDROCHLORIDE 25 MG: 25 TABLET, FILM COATED ORAL at 08:05

## 2025-05-24 RX ADMIN — GABAPENTIN 600 MG: 300 CAPSULE ORAL at 04:05

## 2025-05-24 RX ADMIN — ENOXAPARIN SODIUM 40 MG: 40 INJECTION SUBCUTANEOUS at 04:05

## 2025-05-24 RX ADMIN — METOPROLOL TARTRATE 25 MG: 25 TABLET, FILM COATED ORAL at 08:05

## 2025-05-24 RX ADMIN — CLOPIDOGREL 75 MG: 75 TABLET ORAL at 08:05

## 2025-05-24 RX ADMIN — DULOXETINE 30 MG: 30 CAPSULE, DELAYED RELEASE ORAL at 08:05

## 2025-05-24 RX ADMIN — ATORVASTATIN CALCIUM 40 MG: 40 TABLET, FILM COATED ORAL at 08:05

## 2025-05-24 NOTE — PROGRESS NOTES
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  Patient presented to the ED with complaints of acute onset left-sided weakness with associated numbness on the whole left side including the face and left-sided peripheral vision loss.  Patient reports having posterior headache prior to the onset of symptoms.  He was cutting grass when symptoms started.  Patient on dual antiplatelets at home and is well compliant.  Bradycardic in the ED. BP slightly accelerated.  Labs essentially unremarkable.  CT head was negative.  No territorial perfusion mismatch.  CT angiogram with severe left vertebral stenosis.  MRI brain negative for acute infarct.  Stroke Neurology consulted.  Hospital medicine services consulted for admission.  Patient evaluated by stroke Neurology/Interventional Neurology team, no plans for immediate intervention for vertebral artery stenosis, recommended to continue dual antiplatelets and high-intensity statin, since MRI brain without contrast was negative MRI brain and MRI C-spine with and without contrast was ordered-both non impressive, speech therapy recommended NPO pending MBS, initiated aspirin suppository and plans to reinitiate home medications once he is cleared for by mouth intake, echo non impressive, therapy services consulted     Today: Patient seen and examined at bedside, and chart reviewed.  No issues or complaints.      MEDICATIONS   Scheduled   amitriptyline  25 mg Oral QHS    aspirin  81 mg Oral Daily    atorvastatin  40 mg Oral Daily    clopidogreL  75 mg Oral Daily    DULoxetine  30 mg Oral Daily    enoxparin  40 mg  "Subcutaneous Q24H (prophylaxis, 1700)    gabapentin  600 mg Oral TID    metoprolol tartrate  25 mg Oral BID    nicotine  1 patch Transdermal Daily    topiramate  25 mg Oral BID    traZODone  50 mg Oral QHS     Continuous Infusions  None      PHYSICAL EXAM   VITALS: T 97.5 °F (36.4 °C)   BP (!) 150/95   P (!) 57   RR 18   O2 (!) 83 %    GENERAL: Awake and in NAD  UNGS: CTA B/L  CVS: Normal rate  GI/: Soft, NT, bowel sounds positive.  EXTREMITIES: No LE edema  NEURO: AAOx3  PSYCH: Cooperative      LABS   CBC  No results for input(s): "WBC", "RBC", "HGB", "HCT", "MCV", "MCH", "MCHC", "RDW", "PLT", "RETIC", "ESR" in the last 72 hours.    CHEM  Recent Labs     05/22/25  0350      K 4.0   CO2 28   BUN 15.1   CREATININE 0.92   CALCIUM 8.9         ASSESSMENT   Possible compliated migraine, CVA ruled out  History of lacunar infarct of genu of corpus callosum  Essential HTN  Tobacco use   h/of CAD   BPH     PLAN   Continue ASA/Plavix/statin  Continue therapy services  CM working on rehab placement      Prophylaxis: NANO Lentz MD  Hospital Medicine      "

## 2025-05-24 NOTE — PT/OT/SLP PROGRESS
Physical Therapy Treatment    Patient Name:  Edison Jacobo   MRN:  [id number]    Recommendations:     Discharge therapy intensity: High Intensity Therapy   Discharge Equipment Recommendations: to be determined by next level of care  Barriers to discharge: Impaired mobility    Assessment:     Edison Jacobo is a 65 y.o. male.  He presents with the following impairments/functional limitations: weakness, impaired endurance, impaired functional mobility, impaired self care skills, gait instability, impaired balance, visual deficits, decreased upper extremity function, decreased lower extremity function, impaired fine motor.  Communicated with NSG prior to session.      Rehab Prognosis: Good; patient would benefit from acute skilled PT services to address these deficits and reach maximum level of function.    Recent Surgery: * No surgery found *    General Precautions: Standard, fall  Orthopedic Precautions:    Braces:    Respiratory Status: Nasal cannula, flow 2 L/min  Skin Integrity: Visible skin intact    Plan:     During this hospitalization, patient would benefit from acute PT services 6 x/week to address the identified rehab impairments via gait training, therapeutic activities, therapeutic exercises, neuromuscular re-education and progress toward the following goals:    Plan of Care Expires:  06/21/25    Subjective     Patient found supine upon PT entry to room. Greeted pt and explained the treatment planned for today's session. Pt agreed to session.    Objective:     Functional Mobility:    Bed Mobility: Supine to EOB SBA.    Transfers: Sit to Stands Mod A.   Pt performed stand pivot transfer with Mod A.  Once in chair patient requested to be changed out of his gown into fresh briefs and a t-shirt. Pt stood to initiate doffing briefs then sat back down in chair to complete. New briefs initiated donning in sitting, then completed in standing. Donned t-shirt in sitting and re-routed tele box wires for patient comfort.          Education:  Role and goals of PT, transfer training, bed mobility, gait training, balance training, safety awareness, assistive device, strengthening exercises, and importance of participating in PT to return to PLOF     Patient left up in chair with all lines intact and call button in reach    GOALS:   Multidisciplinary Problems       Physical Therapy Goals          Problem: Physical Therapy    Goal Priority Disciplines Outcome Interventions   Physical Therapy Goal     PT, PT/OT Progressing    Description: Goals to be met by: 25     Patient will increase functional independence with mobility by performin. Supine to sit with [place]  2. Sit to stand transfer with Modified [place]  3. Bed to chair transfer with Modified [place] using Least Restrictive Assistive Device  4. Gait  x 150 feet with Modified [place] using Least Restrictive Assistive Device.   5. Ascend/descend 5 stair with  Handrails [place] using No Assistive Device.                          Time Tracking:     Billable Minutes: Therapeutic Activity 38    Treatment Type: Re-evaluation  PT/PTA: PTA     Number of PTA visits since last PT visit: 3     2025

## 2025-05-24 NOTE — PROGRESS NOTES
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  Patient presented to the ED with complaints of acute onset left-sided weakness with associated numbness on the whole left side including the face and left-sided peripheral vision loss.  Patient reports having posterior headache prior to the onset of symptoms.  He was cutting grass when symptoms started.  Patient on dual antiplatelets at home and is well compliant.  Bradycardic in the ED. BP slightly accelerated.  Labs essentially unremarkable.  CT head was negative.  No territorial perfusion mismatch.  CT angiogram with severe left vertebral stenosis.  MRI brain negative for acute infarct.  Stroke Neurology consulted.  Hospital medicine services consulted for admission.  Patient evaluated by stroke Neurology/Interventional Neurology team, no plans for immediate intervention for vertebral artery stenosis, recommended to continue dual antiplatelets and high-intensity statin, since MRI brain without contrast was negative MRI brain and MRI C-spine with and without contrast was ordered-both non impressive, speech therapy recommended NPO pending MBS, initiated aspirin suppository and plans to reinitiate home medications once he is cleared for by mouth intake, echo non impressive, therapy services consulted     Today: Patient seen and examined at bedside, and chart reviewed.  No changes or events.       MEDICATIONS   Scheduled   amitriptyline  25 mg Oral QHS    aspirin  81 mg Oral Daily    atorvastatin  40 mg Oral Daily    clopidogreL  75 mg Oral Daily    DULoxetine  30 mg Oral Daily    enoxparin  40 mg  Subcutaneous Q24H (prophylaxis, 1700)    gabapentin  600 mg Oral TID    metoprolol tartrate  25 mg Oral BID    nicotine  1 patch Transdermal Daily    topiramate  25 mg Oral BID    traZODone  50 mg Oral QHS     Continuous Infusions  None      PHYSICAL EXAM   VITALS: T 97.5 °F (36.4 °C)   /63   P 64   RR 18   O2 (!) 89 %    GENERAL: Awake and in NAD  UNGS: CTA B/L  CVS: Normal rate  GI/: Soft, NT, bowel sounds positive.  EXTREMITIES: No LE edema  NEURO: AAOx3  PSYCH: Cooperative      LABS   CBC  Recent Labs     05/21/25  0323   WBC 7.12   RBC 4.56*   HGB 13.7*   HCT 41.7*   MCV 91.4   MCH 30.0   MCHC 32.9*   RDW 13.0        CHEM  Recent Labs     05/21/25 0323 05/22/25  0350    141   K 4.1 4.0   CO2 26 28   BUN 14.6 15.1   CREATININE 0.80 0.92   CALCIUM 9.0 8.9   MG 2.00  --    PHOS 3.4  --    ALBUMIN 3.5  --    GLOBULIN 3.0  --    ALKPHOS 65  --    ALT 58*  --    AST 35  --    BILITOT 0.9  --          ASSESSMENT   Possible compliated migraine, CVA ruled out  History of lacunar infarct of genu of corpus callosum  Essential HTN  Tobacco use   h/of CAD   BPH     PLAN   MRIs of brain and C-spine negative, possible complicated migraine  Continue ASA/Plavix/statin  Continue therapy services  CM working on rehab placement      Prophylaxis: NANO Lentz MD  [facility]

## 2025-05-25 PROCEDURE — 63600175 PHARM REV CODE 636 W HCPCS: Performed by: INTERNAL MEDICINE

## 2025-05-25 PROCEDURE — 25000003 PHARM REV CODE 250: Performed by: INTERNAL MEDICINE

## 2025-05-25 PROCEDURE — 21400001 HC TELEMETRY ROOM

## 2025-05-25 RX ADMIN — GABAPENTIN 600 MG: 300 CAPSULE ORAL at 09:05

## 2025-05-25 RX ADMIN — GABAPENTIN 600 MG: 300 CAPSULE ORAL at 08:05

## 2025-05-25 RX ADMIN — ASPIRIN 81 MG CHEWABLE TABLET 81 MG: 81 TABLET CHEWABLE at 08:05

## 2025-05-25 RX ADMIN — METOPROLOL TARTRATE 25 MG: 25 TABLET, FILM COATED ORAL at 08:05

## 2025-05-25 RX ADMIN — TRAZODONE HYDROCHLORIDE 50 MG: 50 TABLET ORAL at 09:05

## 2025-05-25 RX ADMIN — CLOPIDOGREL 75 MG: 75 TABLET ORAL at 08:05

## 2025-05-25 RX ADMIN — METOPROLOL TARTRATE 25 MG: 25 TABLET, FILM COATED ORAL at 09:05

## 2025-05-25 RX ADMIN — ATORVASTATIN CALCIUM 40 MG: 40 TABLET, FILM COATED ORAL at 08:05

## 2025-05-25 RX ADMIN — DULOXETINE 30 MG: 30 CAPSULE, DELAYED RELEASE ORAL at 08:05

## 2025-05-25 RX ADMIN — TOPIRAMATE 25 MG: 25 TABLET, FILM COATED ORAL at 08:05

## 2025-05-25 RX ADMIN — TOPIRAMATE 25 MG: 25 TABLET, FILM COATED ORAL at 09:05

## 2025-05-25 RX ADMIN — OXYCODONE AND ACETAMINOPHEN 1 TABLET: 10; 325 TABLET ORAL at 05:05

## 2025-05-25 RX ADMIN — ENOXAPARIN SODIUM 40 MG: 40 INJECTION SUBCUTANEOUS at 04:05

## 2025-05-25 RX ADMIN — OXYCODONE AND ACETAMINOPHEN 1 TABLET: 10; 325 TABLET ORAL at 06:05

## 2025-05-25 RX ADMIN — AMITRIPTYLINE HYDROCHLORIDE 25 MG: 25 TABLET, FILM COATED ORAL at 09:05

## 2025-05-25 RX ADMIN — GABAPENTIN 600 MG: 300 CAPSULE ORAL at 04:05

## 2025-05-26 VITALS
WEIGHT: 218.94 LBS | HEART RATE: 66 BPM | HEIGHT: 75 IN | DIASTOLIC BLOOD PRESSURE: 70 MMHG | OXYGEN SATURATION: 92 % | TEMPERATURE: 98 F | BODY MASS INDEX: 27.22 KG/M2 | RESPIRATION RATE: 20 BRPM | SYSTOLIC BLOOD PRESSURE: 127 MMHG

## 2025-05-26 PROCEDURE — 97116 GAIT TRAINING THERAPY: CPT | Mod: CQ

## 2025-05-26 PROCEDURE — 25000003 PHARM REV CODE 250: Performed by: INTERNAL MEDICINE

## 2025-05-26 PROCEDURE — 97535 SELF CARE MNGMENT TRAINING: CPT

## 2025-05-26 RX ORDER — BUTALBITAL, ACETAMINOPHEN AND CAFFEINE 50; 325; 40 MG/1; MG/1; MG/1
1 TABLET ORAL EVERY 6 HOURS PRN
Start: 2025-05-26 | End: 2025-06-25

## 2025-05-26 RX ADMIN — TOPIRAMATE 25 MG: 25 TABLET, FILM COATED ORAL at 09:05

## 2025-05-26 RX ADMIN — METOPROLOL TARTRATE 25 MG: 25 TABLET, FILM COATED ORAL at 09:05

## 2025-05-26 RX ADMIN — ATORVASTATIN CALCIUM 40 MG: 40 TABLET, FILM COATED ORAL at 09:05

## 2025-05-26 RX ADMIN — CLOPIDOGREL 75 MG: 75 TABLET ORAL at 09:05

## 2025-05-26 RX ADMIN — ASPIRIN 81 MG CHEWABLE TABLET 81 MG: 81 TABLET CHEWABLE at 09:05

## 2025-05-26 RX ADMIN — DULOXETINE 30 MG: 30 CAPSULE, DELAYED RELEASE ORAL at 09:05

## 2025-05-26 RX ADMIN — OXYCODONE AND ACETAMINOPHEN 1 TABLET: 10; 325 TABLET ORAL at 09:05

## 2025-05-26 RX ADMIN — GABAPENTIN 600 MG: 300 CAPSULE ORAL at 09:05

## 2025-05-26 NOTE — PT/OT/SLP PROGRESS
Occupational Therapy   Treatment    Name: Edison Jacobo  MRN: [id number]  Admitting Diagnosis:  Left-sided weakness       Recommendations:     Recommended therapy intensity at discharge: High Intensity Therapy   Discharge Equipment Recommendations:  to be determined by next level of care  Barriers to discharge:       Assessment:     Edison Jacobo is a 65 y.o. male with a medical diagnosis of Left-sided weakness.  He presents with increased motivation to engage in therapy activities. Performance deficits affecting function are weakness, impaired endurance, impaired sensation, impaired self care skills, impaired functional mobility, gait instability, impaired balance, decreased upper extremity function, decreased lower extremity function, decreased ROM, impaired coordination.     Pt tolerated OT treatment well. Requires min A for sit to stand using RW. Pt requires CGA, verbal cues and extra time for L foot clearance during functional mobility. Chair follow during long distance ambulation due to possible fatigue. Requires CGA for oral hygiene standing at sink /c RW. Recommending high intensity therapy upon d/c to maximize independence in ADLs and functional mobility tasks.     Rehab Prognosis:  Good; patient would benefit from acute skilled OT services to address these deficits and reach maximum level of function.       Plan:     Patient to be seen 6 x/week to address the above listed problems via self-care/home management, therapeutic activities, therapeutic exercises, neuromuscular re-education, cognitive retraining  Plan of Care Expires: 06/21/25  Plan of Care Reviewed with: patient    Subjective     Pain/Comfort:  Pain Rating 1: 0/10    Objective:     Communicated with: nurse prior to session.  Patient found up in chair with oxygen, peripheral IV upon OT entry to room.    General Precautions: Standard, fall    Orthopedic Precautions:N/A  Braces: N/A  Respiratory Status: Nasal cannula, flow 3 L/min     Occupational  Performance:     Functional Mobility/Transfers:  Patient completed Sit <> Stand Transfer with contact guard assistance and minimum assistance  with  rolling walker   Functional Mobility: pt ambulated from chair to bathroom sink /c CGA using RW. Requires some cueing and extra time for L foot clearance. Compensates for LLE weakness by bearing most of weight through UEs. Pt ambulated from bathroom sink to hospital room door /c CGA using RW and chair follow.     Activities of Daily Living:  Grooming: contact guard assistance while performing oral hygiene at sink, LUE used as a stabilizer while performing oral hygiene.     Therapeutic Exercise:  Pt educated on forearm supported wrist flexion, chest press, bicep curls, and shoulder flexion exercises to perform 10 reps, 3x/day.     Therapeutic Positioning    OT interventions performed during the course of today's session in an effort to prevent and/or reduce acquired pressure injuries:   Education was provided on benefits of and recommendations for therapeutic positioning    Skin assessment: all visible skin intact.     [facility] 6 Click ADL: 21    Patient Education:  Patient provided with verbal education education regarding OT role/goals/POC, fall prevention, and safety awareness.  Understanding was verbalized.      Patient left up in chair with all lines intact and call button in reach.    GOALS:   Multidisciplinary Problems       Occupational Therapy Goals          Problem: Occupational Therapy    Goal Priority Disciplines Outcome Interventions   Occupational Therapy Goal     OT, PT/OT Progressing    Description: LTG: Pt will perform basic ADLs and ADL transfers with SPV using LRAD by discharge.    STG: to be met by 6/21/25    Pt will complete grooming standing at sink with LRAD with min A.  Pt will complete UB dressing with min A.  Pt will complete LB dressing with min A using LRAD.  Pt will complete toileting with min A using LRAD.  Pt will complete functional mobility  to/from toilet and toilet transfer with min A using LRAD.   Pt will demo increased strength in L UE to 3/5 MMT for increased functional use during ADL tasks.                        Time Tracking:     OT Date of Treatment: 05/26/25  OT Start Time: 1041  OT Stop Time: 1107  OT Total Time (min): 26 min    Billable Minutes:Self Care/Home Management 2 units    OT/OLYA: OT     Number of OLYA visits since last OT visit: 3    5/26/2025

## 2025-05-26 NOTE — PROGRESS NOTES
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  Patient presented to the ED with complaints of acute onset left-sided weakness with associated numbness on the whole left side including the face and left-sided peripheral vision loss.  Patient reports having posterior headache prior to the onset of symptoms.  He was cutting grass when symptoms started.  Patient on dual antiplatelets at home and is well compliant.  Bradycardic in the ED. BP slightly accelerated.  Labs essentially unremarkable.  CT head was negative.  No territorial perfusion mismatch.  CT angiogram with severe left vertebral stenosis.  MRI brain negative for acute infarct.  Stroke Neurology consulted.  Hospital medicine services consulted for admission.  Patient evaluated by stroke Neurology/Interventional Neurology team, no plans for immediate intervention for vertebral artery stenosis, recommended to continue dual antiplatelets and high-intensity statin, since MRI brain without contrast was negative MRI brain and MRI C-spine with and without contrast was ordered-both non impressive, speech therapy recommended NPO pending MBS, initiated aspirin suppository and plans to reinitiate home medications once he is cleared for by mouth intake, echo non impressive, therapy services consulted     Today: Patient seen and examined at bedside, and chart reviewed.  Remains stable, no new changes.      MEDICATIONS   Scheduled   amitriptyline  25 mg Oral QHS    aspirin  81 mg Oral Daily    atorvastatin  40 mg Oral Daily    clopidogreL  75 mg Oral Daily    DULoxetine  30 mg Oral Daily    enoxparin  40  "mg Subcutaneous Q24H (prophylaxis, 1700)    gabapentin  600 mg Oral TID    metoprolol tartrate  25 mg Oral BID    nicotine  1 patch Transdermal Daily    topiramate  25 mg Oral BID    traZODone  50 mg Oral QHS     Continuous Infusions  None      PHYSICAL EXAM   VITALS: T 98.1 °F (36.7 °C)   /83   P 69   RR 20   O2 97 %    GENERAL: Awake and in NAD  UNGS: CTA B/L  CVS: Normal rate  GI/: Soft, NT, bowel sounds positive.  EXTREMITIES: No LE edema  NEURO: AAOx3  PSYCH: Cooperative      LABS   CBC  No results for input(s): "WBC", "RBC", "HGB", "HCT", "MCV", "MCH", "MCHC", "RDW", "PLT", "RETIC", "ESR" in the last 72 hours.    CHEM  No results for input(s): "NA", "K", "CHLORIDE", "CO2", "BUN", "CREATININE", "GLUCOSE", "CALCIUM", "MG", "PHOS", "ALBUMIN", "GLOBULIN", "ALKPHOS", "ALT", "AST", "BILITOT", "LIPASE", "CRP", "LDH", "HAPTOGLOBIN", "FERRITIN", "IRON", "TIBC", "TSH", "FREET4" in the last 72 hours.        ASSESSMENT   Possible compliated migraine, CVA ruled out  History of lacunar infarct of genu of corpus callosum  Essential HTN  Tobacco use   h/of CAD   BPH     PLAN   Continue ASA/Plavix/statin, therapy services   continues to work on rehab placement      Prophylaxis: NANO Lentz MD  [facility]      "

## 2025-05-26 NOTE — PLAN OF CARE
Clinical updates sent to [facility]. Requested status of transfer also.

## 2025-05-26 NOTE — PT/OT/SLP PROGRESS
Physical Therapy Treatment    Patient Name:  Edison Jacobo   MRN:  [id number]    Recommendations:     Discharge therapy intensity: High Intensity Therapy   Discharge Equipment Recommendations: to be determined by next level of care  Barriers to discharge: Impaired mobility    Assessment:     Edison Jacobo is a 65 y.o. male admitted with a medical diagnosis of  L-sided weakness, MRI (-) acute CVA.  He presents with the following impairments/functional limitations: weakness, impaired endurance, impaired functional mobility, impaired self care skills, gait instability, impaired balance, visual deficits, decreased upper extremity function, decreased lower extremity function, impaired fine motor. Pt initially stated he is unable to lift LUE to put on gown however pt able to lift arm to place hand on RW. PT stated he is able to move LLE however no buckling noted stance phase while ambulating.     Rehab Prognosis: Good; patient would benefit from acute skilled PT services to address these deficits and reach maximum level of function.    Recent Surgery: * No surgery found *      Plan:     During this hospitalization, patient would benefit from acute PT services 6 x/week to address the identified rehab impairments via gait training, therapeutic activities, therapeutic exercises, neuromuscular re-education and progress toward the following goals:    Plan of Care Expires:  06/21/25    Subjective     Chief Complaint: headache  Patient/Family Comments/goals: PLOF  Pain/Comfort:  Pain Rating 1:  (not rated)  Location 1: head      Objective:     Communicated with RN prior to session.  Patient found HOB elevated with telemetry, peripheral IV upon PT entry to room.     General Precautions: Standard, fall  Orthopedic Precautions:    Braces:    Respiratory Status: Room air  Blood Pressure: 130/82 seated after amb, stated he was dizzy  Skin Integrity: Visible skin intact      Functional Mobility:  Bed Mobility:     Supine to Sit: minimum  assistance  Transfers:     Sit to Stand:  minimum assistance with rolling walker  Gait: 6'/2 steps/6' w/RW, Min A  Pt initially requiring physical assistance to advance LLE, with increased reps pt not needing assistance.   No L knee buckling in stance. No trunk sway.    Therapeutic Activities/Exercises:      Education:  Patient provided with verbal education education regarding PT role/goals/POC, fall prevention, and safety awareness.  Understanding was verbalized.     Patient left up in chair with all lines intact, call button in reach, and RN notified    GOALS:   Multidisciplinary Problems       Physical Therapy Goals          Problem: Physical Therapy    Goal Priority Disciplines Outcome Interventions   Physical Therapy Goal     PT, PT/OT Progressing    Description: Goals to be met by: 25     Patient will increase functional independence with mobility by performin. Supine to sit with [place]  2. Sit to stand transfer with Modified [place]  3. Bed to chair transfer with Modified [place] using Least Restrictive Assistive Device  4. Gait  x 150 feet with Modified [place] using Least Restrictive Assistive Device.   5. Ascend/descend 5 stair with  Handrails [place] using No Assistive Device.                          Time Tracking:     PT Received On: 25  PT Start Time: 924     PT Stop Time: 955  PT Total Time (min): 31 min     Billable Minutes: Gait Training 2 units    Treatment Type: Treatment  PT/PTA: PTA     Number of PTA visits since last PT visit: 4     2025

## 2025-05-26 NOTE — PLAN OF CARE
Patient has been accepted by [facility].  Dr. Mccauley notified.  [facility] will arrange for ambulance transportation once report has been called. Report name and number given to nurse.

## 2025-05-26 NOTE — PLAN OF CARE
Problem: Adult Inpatient Plan of Care  Goal: Plan of Care Review  Outcome: Progressing     Problem: Adult Inpatient Plan of Care  Goal: Patient-Specific Goal (Individualized)  Outcome: Progressing     Problem: Stroke, Ischemic (Includes Transient Ischemic Attack)  Goal: Optimal Coping  Outcome: Progressing  Goal: Effective Bowel Elimination  Outcome: Progressing  Goal: Optimal Cerebral Tissue Perfusion  Outcome: Progressing  Goal: Optimal Cognitive Function  Outcome: Progressing  Goal: Improved Communication Skills  Outcome: Progressing  Goal: Optimal Functional Ability  Outcome: Progressing  Goal: Optimal Nutrition Intake  Outcome: Progressing  Goal: Effective Oxygenation and Ventilation  Outcome: Progressing  Goal: Improved Sensorimotor Function  Outcome: Progressing  Goal: Safe and Effective Swallow  Outcome: Progressing  Goal: Effective Urinary Elimination  Outcome: Progressing

## 2025-05-26 NOTE — PLAN OF CARE
05/26/25 1232   Final Note   Assessment Type Final Discharge Note   Anticipated Discharge Disposition Rehab   Post-Acute Status   Discharge Delays None known at this time     Patient has been accepted to [facility].  Discharge docs sent by [facility].  No further discharge planning needs identified at this time.

## 2025-05-26 NOTE — DISCHARGE SUMMARY
"Hospital Medicine  Discharge Summary    Patient Name: Edison Jacobo  MRN: [id number]  Admit Date: 5/20/2025  Discharge Date: 5/26/2025   Status: IP- Inpatient   Length of Stay: 6      PHYSICIANS   Admitting Physician: Marisol Pfeiffer MD  Discharging Physician: Jose Lentz MD.  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 Patient presented to the ED with complaints of acute onset left-sided weakness with associated numbness on the whole left side including the face and left-sided peripheral vision loss. Patient reports having posterior headache prior to the onset of symptoms. He was cutting grass when symptoms started. Patient on dual antiplatelets at home and is well compliant. Bradycardic in the ED. BP slightly accelerated. Labs essentially unremarkable. CT head was negative. No territorial perfusion mismatch. CT angiogram with severe left vertebral stenosis. MRI brain negative for acute infarct. Stroke Neurology consulted. Hospital medicine services consulted for admission. Patient evaluated by stroke Neurology/Interventional Neurology team, no plans for immediate intervention for vertebral artery stenosis, recommended to continue dual antiplatelets and high-intensity statin, since MRI brain without contrast was negative, MRI brain and MRI C-spine with and without contrast was ordered-both non impressive. Speech therapy recommended NPO pending MBS, initiated aspirin suppository and plans to reinitiate home medications once he is cleared for by mouth intake.  Echo (not) impressive, therapy services " "consulted"    Cleared for diet, working with therapy, but continued continued with left sided weakness.  Neurology suggest possible complicated migraine. Trial of Imitrex given, started on fiorcet as well for headache.  CM consulted and inpatient rehab pursued.  Patient showing some improvement and now accepted to rehab; he is stable for transfer.      STATUS  Improved    DISPOSITION  Discharge to inpatient rehab    DIET  Cardiac    ACTIVITY  As tolerated      FOLLOW-UP       Mora Evangelista FNP. Go on 8/11/2025.    Specialty: Neurology  Why: at 2PM.  Severe stenosis of the left vertebral artery consideration for DSA  Contact information:  [address] [place] [address] [address] Gordon [place]ARIAS [id number]  [phone]               Royce Canchola MD Follow up.    Specialty: Internal Medicine  Why: Following rehab discharge.  Contact information:  [facility] [address]  [place] [id number] [phone]                 DISCHARGE MEDICATION RECONCILIATION     PAUSE     UBRELVY 100 mg tablet  Wait to take this until your doctor or other care provider tells you to start again.  Generic drug: ubrogepant  Take 1 tablet (100 mg total) by mouth as needed for Migraine. If symptoms persist or return, may repeat dose after 2 hours. Maximum: 200 mg per 24 hours       PRESCRIBED     butalbital-acetaminophen-caffeine -40 mg -40 mg per tablet  Commonly known as: FIORICET, ESGIC  Take 1 tablet by mouth every 6 (six) hours as needed for Headaches.       CONTINUE     amitriptyline 25 MG tablet  Commonly known as: ELAVIL  Take 1 tablet (25 mg total) by mouth every evening.     aspirin 81 MG EC tablet  Commonly known as: ECOTRIN     bisoprolol 5 MG tablet  Commonly known as: ZEBETA     cholecalciferol (vitamin D3) 50 mcg (2,000 unit) Cap capsule  Commonly known as: VITAMIN D3     clopidogreL 75 mg tablet  Commonly known as: PLAVIX     cyanocobalamin 1000 MCG tablet  Commonly known as: VITAMIN B-12     desonide 0.05 % cream  Commonly known as: " ANALIOWEN     DULoxetine 30 MG capsule  Commonly known as: CYMBALTA     fish oil-omega-3 fatty acids 300-1,000 mg capsule     gabapentin 600 MG tablet  Commonly known as: NEURONTIN  Take 1 tablet (600 mg total) by mouth 3 (three) times daily.     rosuvastatin 40 MG Tab  Commonly known as: CRESTOR     topiramate 25 MG tablet  Commonly known as: TOPAMAX     traZODone 50 MG tablet  Commonly known as: DESYREL              PHYSICAL EXAM   VITALS: T 98 °F (36.7 °C)   /70   P 66   RR 20   O2 (!) 92 %    GENERAL: Awake and in NAD  UNGS: CTA B/L  CVS: Normal rate  GI/: Soft, NT, bowel sounds positive.  EXTREMITIES: No LE edema  NEURO: AAOx3  PSYCH: Cooperative      Discharge time: 33 minutes     Jose Lentz MD  [facility]       DIAGNOSITCS   CBC:   Recent Labs   Lab 05/20/25  1026 05/20/25  1050 05/21/25  0323   WBC 7.46  --  7.12   HGB 14.2  --  13.7*   HCT 43.7 38 41.7*     --  164       CMP:   Recent Labs   Lab 05/20/25  1026 05/21/25  0323 05/22/25  0350   GLU 94 82 107   CALCIUM 9.0 9.0 8.9   ALBUMIN 3.9 3.5  --    PROT 6.9 6.5  --     139 141   K 4.3 4.1 4.0   CO2 22* 26 28   * 106 105   BUN 17.8 14.6 15.1   CREATININE 0.92 0.80 0.92   ALKPHOS 65 65  --    ALT 60* 58*  --    AST 34 35  --    BILITOT 0.7 0.9  --    MG 2.00 2.00  --    PHOS  --  3.4  --      Estimated Creatinine Clearance: 95.7 mL/min (based on SCr of 0.92 mg/dL).    COAG:  Recent Labs   Lab 05/20/25  1026 05/20/25  1026 05/21/25  0323   APTT  --   --  29.0   INR 1.1 1.5* 1.2       Lab Results   Component Value Date    LDL 75.00 05/20/2025      Lab Results   Component Value Date    HGBA1C 5.6 06/10/2024        X-Ray Chest 1 View  Result Date: 5/21/2025  EXAMINATION: XR CHEST 1 VIEW CLINICAL HISTORY: Shortness of breath; TECHNIQUE: One view COMPARISON: None available. FINDINGS: Cardiopericardial silhouette is within normal limits.  No acute dense focal or segmental consolidation, congestive process, pleural effusions or  pneumothorax.     No acute cardiopulmonary process identified. Electronically signed by: Matthias Osborne Date:    05/21/2025 Time:    17:27    Fl Modified Barium Swallow Speech  Result Date: 5/21/2025  See procedure notes from Speech Pathologist. This procedure was auto-finalized.    MRI Cervical Spine W WO Cont  Result Date: 5/20/2025  EXAMINATION: MRI CERVICAL SPINE W WO CONTRAST CLINICAL HISTORY: Acute onset left-sided weakness; TECHNIQUE: Multiplanar, multisequence MR imaging of the cervical spine with and without contrast. COMPARISON: MRI cervical spine dated 06/12/2024 FINDINGS: Cervical alignment is normal.  The vertebral body heights are maintained.  There is mild endplate edema ended enhancement at C6-C7, favored to be degenerative. There is no cord signal abnormality.  There is no abnormal intrathecal or epidural enhancement.  There is no epidural fluid collection. The paraspinal soft tissues are unremarkable. Disc level analysis as follows: C2-C3: No disc herniation.  No significant spinal canal stenosis.  Mild bilateral neural foraminal stenosis secondary to uncovertebral and facet hypertrophy. C3-C4: Posterior disc osteophyte complex and thickening of the ligamentum flavum with moderate narrowing of the spinal canal.  Moderate to severe bilateral foraminal stenosis secondary to uncovertebral and facet hypertrophy. C4-C5: Posterior disc osteophyte complex and thickening of the ligamentum flavum with moderate narrowing of the spinal canal.  Severe bilateral neural foraminal stenosis secondary to uncovertebral and facet hypertrophy. C5-C6: Posterior disc osteophyte complex and thickening of the ligamentum flavum with moderate narrowing of the spinal canal.  Severe bilateral neural foraminal stenosis secondary to uncovertebral and facet hypertrophy. C6-C7: Posterior disc osteophyte complex and thickening of the ligamentum flavum with moderate narrowing of the spinal canal.  Severe bilateral neural foraminal  stenosis secondary to uncovertebral and facet hypertrophy. C7-T1: No disc herniation.  No significant spinal canal or neural foraminal stenosis.     1. Moderate degenerative narrowing of the spinal canal at C3-C7. 2. Multilevel neural foraminal stenoses as described. 3. No cord signal abnormality or abnormal enhancement. Electronically signed by: Tatum Jacques Date:    05/20/2025 Time:    16:57    MRI Brain W WO Contrast  Result Date: 5/20/2025  EXAMINATION: MRI BRAIN W WO CONTRAST CLINICAL HISTORY: Brain metastases suspected; TECHNIQUE: Multiplanar, multisequence MR images of the brain were obtained with and without administration of intravenous contrast. COMPARISON: MRI brain dated 05/28/2025 FINDINGS: There is no restricted diffusion, hemorrhage or edema. Mild scattered T2/FLAIR hyperintensities in the subcortical and periventricular white matter likely represent chronic microvascular ischemic changes.  There is no abnormal parenchymal or leptomeningeal enhancement. There is no mass effect or midline shift.  The basal cisterns are patent.  There is mild diffuse parenchymal volume loss.  No hydrocephalus or abnormal extra-axial fluid collection.  The major intracranial voids are patent.  There is mild scattered paranasal sinus mucosal thickening.     1. No evidence of intracranial metastatic disease. 2. Mild chronic microvascular ischemic changes. Electronically signed by: Tatum Jacques Date:    05/20/2025 Time:    16:52    MRI Brain Limited Without Contrast  Result Date: 5/20/2025  EXAMINATION: MRI BRAIN LIMITED WITHOUT CONTRAST CLINICAL HISTORY: Stroke alert. Left sided weakness, acute; TECHNIQUE: Limited MR images of the brain were obtained without the administration of intravenous contrast. COMPARISON: CT head dated 05/20/2025 FINDINGS: There is no restricted diffusion, hemorrhage or edema.  Mild scattered T2/FLAIR hyperintensities in the subcortical and periventricular white matter likely represent  chronic microvascular ischemic changes.  There is no mass effect or midline shift.  The basal cisterns are patent.     1. No acute infarct. 2. Mild chronic microvascular ischemic changes. Electronically signed by: Tatum Jacques Date:    05/20/2025 Time:    11:17    CTA Head and Neck for Code Stroke (XPD)  Result Date: 5/20/2025  EXAMINATION: CTA HEAD AND NECK FOR STROKE (XPD) CLINICAL HISTORY: Neuro deficit, acute, stroke suspected.  Left-sided neurological deficit. TECHNIQUE: Non contrast low dose axial images were obtained thought the head. CT angiogram was performed from the level of the rich to the top of the head following the IV administration of 94mL of Omnipaque 350.   Sagittal and coronal, 3D and maximum intensity projection reconstructions were performed. Two additional phases of immediate post-contrast CT images were performed through the head alone. Arterial stenosis percentages are based on NASCET measurement criteria.  Dose reduction techniques including automatic exposure control (AEC) were utilized. Dose (DLP): 1510 mGycm COMPARISON: Concurrent CT head without contrast and CT cerebral perfusion FINDINGS: CTA HEAD: INTRACRANIAL: Brain parenchyma demonstrates normal attenuation and configuration.  No acute intracranial hemorrhage.  No hydrocephalus.  No intracranial mass effect.  No abnormal intracranial enhancement. SINUSES: Visualized paranasal sinuses and mastoids are clear. SKULL/SCALP: Visualized osseous structures are normal. ORBITS: Visualized orbits are normal. VASCULATURE: No proximal branch occlusion or severe focal stenosis in the major intracranial cerebral arteries.  Mild calcific atherosclerosis of the cavernous segments of both internal carotid arteries.  No intracranial aneurysm.  No vascular malformation.  Deep cerebral veins and dural venous sinuses enhance normally. CTA NECK: AORTIC ARCH: Conventional aortic arch anatomy. LEFT CAROTID COMMON CAROTID: No occlusion,  hemodynamically significant stenosis, dissection or aneurysm. INTERNAL CAROTID: Tiny amount of calcific plaque along the left carotid bulb.  No occlusion, hemodynamically significant stenosis, dissection or aneurysm. RIGHT CAROTID COMMON CAROTID: No occlusion, hemodynamically significant stenosis, dissection or aneurysm. INTERNAL CAROTID: Tiny amount of calcific plaque along the right carotid bulb.  No occlusion, hemodynamically significant stenosis, dissection or aneurysm. VERTEBRAL ARTERIES LEFT VERTEBRAL: Severe stenosis of the origin secondary to the soft atherosclerotic plaque.  Otherwise, normal appearance of the left vertebral artery distal to the origin. RIGHT VERTEBRAL: No occlusion, hemodynamically significant stenosis, dissection or aneurysm. OTHER: Bilateral subpleural reticulation in the lungs likely representing chronic interstitial lung changes.  Paraseptal emphysema bilaterally as well.  No acute fracture or aggressive osseous lesion.  No significant pathology in the visualized cervical soft tissues.  Moderate coronary artery calcific atherosclerosis.     No evidence of large vessel occlusion. Severe stenosis of the left vertebral artery origin without evidence of downstream stenosis. An epic message was acknowledged by Dr. Charles at 10:57 on 05/20/2025 Electronically signed by: Jorge Huynh Date:    05/20/2025 Time:    10:59    CT Brain Perfusion inc Post Processing  Result Date: 5/20/2025  EXAMINATION: CT BRAIN PERFUSION INC POST PROCESSING CLINICAL HISTORY: stroke alert, left sided weakness; TECHNIQUE: Axial postcontrast imaging of the brain was obtained for CT perfusion with post processing. COMPARISON: CT head from the same day FINDINGS: There is no definite territorial perfusion mismatch identified.     No definite territorial perfusion mismatch. Electronically signed by: Tatum Jacques Date:    05/20/2025 Time:    10:55    CT HEAD FOR CODE STROKE  Result Date: 5/20/2025  EXAMINATION CT HEAD  "FOR CODE STROKE CLINICAL HISTORY Neuro deficit, acute, stroke suspected; TECHNIQUE Non-contrast axial CT images were acquired and multiplanar reconstructions accomplished by a CT technologist at a separate workstation, pushed to PACS for physician review. COMPARISON 11 June 2024 FINDINGS Images were reviewed in subdural, brain, soft tissue, and bone windows. Exam quality: Motion/streak artifact limits assessment of the posterior fossa. Hemorrhage: No evidence of acute hyperattenuating blood products. Parenchyma: There is diffuse bilateral supratentorial white matter hypoattenuation, typical of chronic microvascular changes. No discrete mass, mass effect, or CT evidence of acute large vascular territory insult. Gray-white differentiation is preserved. No midline shift is evident. CSF spaces: Proportional appearance of ventricular and sulcal enlargement. No hydrocephalus. No masses or fluid collections. Other findings: No focally hyperdense artery. Scattered carotid siphon calcifications are present. No abnormal densities within the dural sinuses. No abnormalities of the scalp or subjacent osseous structures. Mastoids are well aerated. No focal abnormality of the sella. The included facial structures are unremarkable. IMPRESSION 1. No acute intracranial hemorrhage. 2. No findings to suggest large vascular territory acute ischemic insult. ========== In accordance with the "Code FAST" protocol, above findings were reported to SONIA Pérez NP (Stroke Team) prior to completion of the final report (5/20/2025; 10:36). RECOMMENDATION(S) Additional assessment utilizing brain MRI would allow more sensitive evaluation if symptoms persist or there is other cause for elevated clinical concern. RADIATION DOSE Automated tube current modulation, weight-based exposure dosing, and/or iterative reconstruction technique utilized to reach lowest reasonably achievable exposure rate. DLP: 1205 mGy*cm Electronically signed by: Royce Willingham " Date:    05/20/2025 Time:    10:45

## 2025-08-11 ENCOUNTER — OFFICE VISIT (OUTPATIENT)
Dept: NEUROLOGY | Facility: CLINIC | Age: 65
End: 2025-08-11
Payer: MEDICARE

## 2025-08-11 VITALS
BODY MASS INDEX: 27.48 KG/M2 | DIASTOLIC BLOOD PRESSURE: 86 MMHG | WEIGHT: 221 LBS | HEIGHT: 75 IN | SYSTOLIC BLOOD PRESSURE: 129 MMHG

## 2025-08-11 DIAGNOSIS — Z86.73 H/O: STROKE: Primary | ICD-10-CM

## 2025-08-11 DIAGNOSIS — F41.9 ANXIETY: ICD-10-CM

## 2025-08-11 DIAGNOSIS — G43.409 HEMIPLEGIC MIGRAINE WITHOUT STATUS MIGRAINOSUS, NOT INTRACTABLE: ICD-10-CM

## 2025-08-11 PROCEDURE — 99999 PR PBB SHADOW E&M-EST. PATIENT-LVL III: CPT | Mod: PBBFAC,,, | Performed by: NURSE PRACTITIONER

## 2025-08-11 RX ORDER — DULOXETIN HYDROCHLORIDE 60 MG/1
60 CAPSULE, DELAYED RELEASE ORAL DAILY
COMMUNITY
Start: 2025-08-02

## 2025-08-11 RX ORDER — ATORVASTATIN CALCIUM 40 MG/1
1 TABLET, FILM COATED ORAL DAILY
COMMUNITY

## 2025-08-11 RX ORDER — ALPRAZOLAM 0.5 MG/1
0.5 TABLET ORAL NIGHTLY PRN
Qty: 90 TABLET | Refills: 0 | Status: SHIPPED | OUTPATIENT
Start: 2025-08-11 | End: 2025-11-09